# Patient Record
Sex: MALE | Race: WHITE | Employment: FULL TIME | URBAN - METROPOLITAN AREA
[De-identification: names, ages, dates, MRNs, and addresses within clinical notes are randomized per-mention and may not be internally consistent; named-entity substitution may affect disease eponyms.]

---

## 2017-01-23 ENCOUNTER — HOSPITAL ENCOUNTER (EMERGENCY)
Age: 57
Discharge: HOME | End: 2017-01-23
Attending: EMERGENCY MEDICINE

## 2017-01-23 ENCOUNTER — APPOINTMENT (OUTPATIENT)
Dept: GENERAL RADIOLOGY | Age: 57
End: 2017-01-23
Attending: PHYSICIAN ASSISTANT

## 2017-01-23 VITALS
DIASTOLIC BLOOD PRESSURE: 78 MMHG | BODY MASS INDEX: 32.47 KG/M2 | TEMPERATURE: 98.2 F | SYSTOLIC BLOOD PRESSURE: 140 MMHG | HEIGHT: 73 IN | RESPIRATION RATE: 18 BRPM | HEART RATE: 89 BPM | WEIGHT: 245 LBS | OXYGEN SATURATION: 94 %

## 2017-01-23 DIAGNOSIS — J20.9 ACUTE BRONCHITIS, UNSPECIFIED ORGANISM: Primary | ICD-10-CM

## 2017-01-23 RX ORDER — HYDROCODONE POLISTIREX AND CHLORPHENIRAMINE POLISTIREX 10; 8 MG/5ML; MG/5ML
5 SUSPENSION, EXTENDED RELEASE ORAL
Qty: 60 ML | Refills: 0 | Status: ON HOLD | OUTPATIENT
Start: 2017-01-23 | End: 2018-05-19

## 2017-01-23 RX ORDER — LEVOFLOXACIN 500 MG/1
500 TABLET, FILM COATED ORAL DAILY
Qty: 7 TAB | Refills: 0 | Status: ON HOLD | OUTPATIENT
Start: 2017-01-23 | End: 2018-05-19

## 2017-01-23 NOTE — DISCHARGE INSTRUCTIONS
Bronchitis: Care Instructions  Your Care Instructions    Bronchitis is inflammation of the bronchial tubes, which carry air to the lungs. The tubes swell and produce mucus, or phlegm. The mucus and inflamed bronchial tubes make you cough. You may have trouble breathing. Most cases of bronchitis are caused by viruses like those that cause colds. Antibiotics usually do not help and they may be harmful. Bronchitis usually develops rapidly and lasts about 2 to 3 weeks in otherwise healthy people. Follow-up care is a key part of your treatment and safety. Be sure to make and go to all appointments, and call your doctor if you are having problems. It's also a good idea to know your test results and keep a list of the medicines you take. How can you care for yourself at home? · Take all medicines exactly as prescribed. Call your doctor if you think you are having a problem with your medicine. · Get some extra rest.  · Take an over-the-counter pain medicine, such as acetaminophen (Tylenol), ibuprofen (Advil, Motrin), or naproxen (Aleve) to reduce fever and relieve body aches. Read and follow all instructions on the label. · Do not take two or more pain medicines at the same time unless the doctor told you to. Many pain medicines have acetaminophen, which is Tylenol. Too much acetaminophen (Tylenol) can be harmful. · Take an over-the-counter cough medicine that contains dextromethorphan to help quiet a dry, hacking cough so that you can sleep. Avoid cough medicines that have more than one active ingredient. Read and follow all instructions on the label. · Breathe moist air from a humidifier, hot shower, or sink filled with hot water. The heat and moisture will thin mucus so you can cough it out. · Do not smoke. Smoking can make bronchitis worse. If you need help quitting, talk to your doctor about stop-smoking programs and medicines. These can increase your chances of quitting for good.   When should you call for help? Call 911 anytime you think you may need emergency care. For example, call if:  · You have severe trouble breathing. Call your doctor now or seek immediate medical care if:  · You have new or worse trouble breathing. · You cough up dark brown or bloody mucus (sputum). · You have a new or higher fever. · You have a new rash. Watch closely for changes in your health, and be sure to contact your doctor if:  · You cough more deeply or more often, especially if you notice more mucus or a change in the color of your mucus. · You are not getting better as expected. Where can you learn more? Go to http://olegario-justin.info/. Enter H333 in the search box to learn more about \"Bronchitis: Care Instructions. \"  Current as of: May 23, 2016  Content Version: 11.1  © 9373-8675 [employer]. Care instructions adapted under license by [employer] (which disclaims liability or warranty for this information). If you have questions about a medical condition or this instruction, always ask your healthcare professional. [address] any warranty or liability for your use of this information.

## 2017-01-23 NOTE — UC PROVIDER NOTE
Patient is a 61 y.o. male presenting with fever and cough. The history is provided by the patient. Fever    This is a new problem. The current episode started more than 1 week ago. The problem has been resolved. Patient reports a subjective fever - was not measured. Associated symptoms include cough. Pertinent negatives include no chest pain. He has tried nothing for the symptoms. Cough   The current episode started more than 1 week ago. The problem occurs every few minutes. The problem has not changed since onset. The cough is non-productive. Patient reports a subjective fever - was not measured. Associated symptoms include rhinorrhea. Pertinent negatives include no chest pain, no chills, no sweats and no wheezing. He has tried nothing for the symptoms. He is a smoker. His past medical history is significant for COPD. His past medical history does not include pneumonia or asthma. Past Medical History   Diagnosis Date    Diabetes ([facility] [address]     Glaucoma      right eye    Hypercholesteremia     Hypertension     Kidney stones     MI, old         Past Surgical History   Procedure Laterality Date    Hx coronary stent placement      Hx coronary artery bypass graft      Hx wisdom teeth extraction           No family history on file. Social History     Social History    Marital status:      Spouse name: N/A    Number of children: N/A    Years of education: N/A     Occupational History    Not on file. Social History Main Topics    Smoking status: Current Every Day Smoker    Smokeless tobacco: Not on file    Alcohol use Yes    Drug use: No    Sexual activity: Not on file     Other Topics Concern    Not on file     Social History Narrative    No narrative on file                ALLERGIES: Review of patient's allergies indicates no known allergies. Review of Systems   Constitutional: Positive for fever. Negative for chills. HENT: Positive for rhinorrhea.     Respiratory: Positive for cough. Negative for wheezing. Cardiovascular: Negative for chest pain. Vitals:    01/23/17 1307   BP: 140/78   Pulse: 89   Resp: 18   Temp: 98.2 °F (36.8 °C)   SpO2: 94%   Weight: 111.1 kg (245 lb)   Height: 6' 1\" (1.854 m)       Physical Exam   Constitutional: He appears well-developed and well-nourished. HENT:   Right Ear: External ear normal.   Left Ear: External ear normal.   Eyes: EOM are normal.   Cardiovascular: Normal rate, regular rhythm and normal heart sounds. Pulmonary/Chest: Effort normal. No respiratory distress. He has wheezes. He has no rales. He exhibits no tenderness. Neurological: He is alert. Skin: Skin is warm and dry. Psychiatric: He has a normal mood and affect. His behavior is normal. Judgment and thought content normal.   Nursing note and vitals reviewed. MDM     Differential Diagnosis; Clinical Impression; Plan:     CLINICAL IMPRESSION:  Acute bronchitis, unspecified organism  (primary encounter diagnosis)    Plan:  1. levaquin   2. tussionex  3. Amount and/or Complexity of Data Reviewed:   Tests in the radiology section of CPT®:  Ordered and reviewed  Risk of Significant Complications, Morbidity, and/or Mortality:   Presenting problems: Moderate  Diagnostic procedures: Moderate  Management options:   Moderate  Progress:   Patient progress:  Stable      Procedures

## 2017-10-05 ENCOUNTER — HOSPITAL ENCOUNTER (OUTPATIENT)
Dept: ULTRASOUND IMAGING | Age: 57
Discharge: HOME | End: 2017-10-05
Payer: COMMERCIAL

## 2017-10-05 DIAGNOSIS — R74.8 INCREASED LIVER ENZYMES: ICD-10-CM

## 2017-10-05 PROCEDURE — 76700 US EXAM ABDOM COMPLETE: CPT

## 2018-01-13 ENCOUNTER — HOSPITAL ENCOUNTER (OUTPATIENT)
Dept: MRI IMAGING | Age: 58
Discharge: HOME | End: 2018-01-13
Payer: COMMERCIAL

## 2018-01-13 VITALS — WEIGHT: 250 LBS | BODY MASS INDEX: 32.98 KG/M2

## 2018-01-13 DIAGNOSIS — N28.9 KIDNEY TROUBLE: ICD-10-CM

## 2018-01-13 LAB — CREAT BLD-MCNC: 0.8 MG/DL (ref 0.6–1.3)

## 2018-01-13 PROCEDURE — 74183 MRI ABD W/O CNTR FLWD CNTR: CPT

## 2018-01-13 PROCEDURE — A9577 INJ MULTIHANCE: HCPCS

## 2018-01-13 PROCEDURE — 74011000258 HC RX REV CODE- 258

## 2018-01-13 PROCEDURE — 74011250636 HC RX REV CODE- 250/636

## 2018-01-13 PROCEDURE — 82565 ASSAY OF CREATININE: CPT

## 2018-01-13 RX ORDER — SODIUM CHLORIDE 0.9 % (FLUSH) 0.9 %
10 SYRINGE (ML) INJECTION
Status: COMPLETED | OUTPATIENT
Start: 2018-01-13 | End: 2018-01-13

## 2018-01-13 RX ADMIN — GADOBENATE DIMEGLUMINE 20 ML: 529 INJECTION, SOLUTION INTRAVENOUS at 09:25

## 2018-01-13 RX ADMIN — Medication 10 ML: at 09:25

## 2018-01-13 RX ADMIN — SODIUM CHLORIDE 100 ML: 900 INJECTION, SOLUTION INTRAVENOUS at 09:24

## 2018-05-18 ENCOUNTER — HOSPITAL ENCOUNTER (INPATIENT)
Age: 58
LOS: 5 days | Discharge: HOME | DRG: 246 | End: 2018-05-23
Attending: EMERGENCY MEDICINE | Admitting: HOSPITALIST
Payer: COMMERCIAL

## 2018-05-18 ENCOUNTER — APPOINTMENT (OUTPATIENT)
Dept: GENERAL RADIOLOGY | Age: 58
DRG: 246 | End: 2018-05-18
Attending: EMERGENCY MEDICINE
Payer: COMMERCIAL

## 2018-05-18 DIAGNOSIS — I20.0 UNSTABLE ANGINA (HCC): Primary | ICD-10-CM

## 2018-05-18 DIAGNOSIS — I21.4 NSTEMI (NON-ST ELEVATED MYOCARDIAL INFARCTION) (HCC): ICD-10-CM

## 2018-05-18 LAB
ALBUMIN SERPL-MCNC: 3.5 G/DL (ref 3.5–5)
ALBUMIN/GLOB SERPL: 1.1 {RATIO} (ref 1.1–2.2)
ALP SERPL-CCNC: 139 U/L (ref 45–117)
ALT SERPL-CCNC: 49 U/L (ref 12–78)
ANION GAP SERPL CALC-SCNC: 12 MMOL/L (ref 5–15)
APTT PPP: 25.9 SEC (ref 22.1–32)
APTT PPP: 26.3 SEC (ref 22.1–32)
AST SERPL-CCNC: 23 U/L (ref 15–37)
BASOPHILS # BLD: 0 K/UL (ref 0–0.1)
BASOPHILS # BLD: 0.2 K/UL (ref 0–0.1)
BASOPHILS NFR BLD: 0 % (ref 0–1)
BASOPHILS NFR BLD: 2 % (ref 0–1)
BILIRUB SERPL-MCNC: 0.3 MG/DL (ref 0.2–1)
BNP SERPL-MCNC: 254 PG/ML (ref 0–125)
BUN SERPL-MCNC: 14 MG/DL (ref 6–20)
BUN/CREAT SERPL: 14 (ref 12–20)
CALCIUM SERPL-MCNC: 8.6 MG/DL (ref 8.5–10.1)
CHLORIDE SERPL-SCNC: 103 MMOL/L (ref 97–108)
CK SERPL-CCNC: 154 U/L (ref 39–308)
CO2 SERPL-SCNC: 23 MMOL/L (ref 21–32)
CREAT SERPL-MCNC: 0.98 MG/DL (ref 0.7–1.3)
DIFFERENTIAL METHOD BLD: ABNORMAL
DIFFERENTIAL METHOD BLD: ABNORMAL
EOSINOPHIL # BLD: 0.1 K/UL (ref 0–0.4)
EOSINOPHIL # BLD: 0.5 K/UL (ref 0–0.4)
EOSINOPHIL NFR BLD: 1 % (ref 0–7)
EOSINOPHIL NFR BLD: 5 % (ref 0–7)
ERYTHROCYTE [DISTWIDTH] IN BLOOD BY AUTOMATED COUNT: 13.5 % (ref 11.5–14.5)
ERYTHROCYTE [DISTWIDTH] IN BLOOD BY AUTOMATED COUNT: 13.7 % (ref 11.5–14.5)
GLOBULIN SER CALC-MCNC: 3.3 G/DL (ref 2–4)
GLUCOSE BLD STRIP.AUTO-MCNC: 236 MG/DL (ref 65–100)
GLUCOSE SERPL-MCNC: 244 MG/DL (ref 65–100)
HCT VFR BLD AUTO: 45 % (ref 36.6–50.3)
HCT VFR BLD AUTO: 45.6 % (ref 36.6–50.3)
HGB BLD-MCNC: 15.1 G/DL (ref 12.1–17)
HGB BLD-MCNC: 15.3 G/DL (ref 12.1–17)
IMM GRANULOCYTES # BLD: 0 K/UL
IMM GRANULOCYTES # BLD: 0 K/UL
IMM GRANULOCYTES NFR BLD AUTO: 0 %
IMM GRANULOCYTES NFR BLD AUTO: 0 %
LYMPHOCYTES # BLD: 3.8 K/UL (ref 0.8–3.5)
LYMPHOCYTES # BLD: 4.1 K/UL (ref 0.8–3.5)
LYMPHOCYTES NFR BLD: 38 % (ref 12–49)
LYMPHOCYTES NFR BLD: 44 % (ref 12–49)
MCH RBC QN AUTO: 29.8 PG (ref 26–34)
MCH RBC QN AUTO: 29.9 PG (ref 26–34)
MCHC RBC AUTO-ENTMCNC: 33.6 G/DL (ref 30–36.5)
MCHC RBC AUTO-ENTMCNC: 33.6 G/DL (ref 30–36.5)
MCV RBC AUTO: 88.9 FL (ref 80–99)
MCV RBC AUTO: 89.1 FL (ref 80–99)
MONOCYTES # BLD: 0.3 K/UL (ref 0–1)
MONOCYTES # BLD: 0.5 K/UL (ref 0–1)
MONOCYTES NFR BLD: 3 % (ref 5–13)
MONOCYTES NFR BLD: 5 % (ref 5–13)
NEUTS SEG # BLD: 4.2 K/UL (ref 1.8–8)
NEUTS SEG # BLD: 5.5 K/UL (ref 1.8–8)
NEUTS SEG NFR BLD: 46 % (ref 32–75)
NEUTS SEG NFR BLD: 56 % (ref 32–75)
NRBC # BLD: 0 K/UL (ref 0–0.01)
NRBC # BLD: 0 K/UL (ref 0–0.01)
NRBC BLD-RTO: 0 PER 100 WBC
NRBC BLD-RTO: 0 PER 100 WBC
PLATELET # BLD AUTO: 257 K/UL (ref 150–400)
PLATELET # BLD AUTO: 259 K/UL (ref 150–400)
PMV BLD AUTO: 10 FL (ref 8.9–12.9)
PMV BLD AUTO: 10.1 FL (ref 8.9–12.9)
POTASSIUM SERPL-SCNC: 3.8 MMOL/L (ref 3.5–5.1)
PROT SERPL-MCNC: 6.8 G/DL (ref 6.4–8.2)
RBC # BLD AUTO: 5.06 M/UL (ref 4.1–5.7)
RBC # BLD AUTO: 5.12 M/UL (ref 4.1–5.7)
RBC MORPH BLD: ABNORMAL
RBC MORPH BLD: ABNORMAL
SERVICE CMNT-IMP: ABNORMAL
SODIUM SERPL-SCNC: 138 MMOL/L (ref 136–145)
THERAPEUTIC RANGE,PTTT: NORMAL SECS (ref 58–77)
THERAPEUTIC RANGE,PTTT: NORMAL SECS (ref 58–77)
TROPONIN I SERPL-MCNC: 0.49 NG/ML
WBC # BLD AUTO: 9.3 K/UL (ref 4.1–11.1)
WBC # BLD AUTO: 9.9 K/UL (ref 4.1–11.1)
WBC MORPH BLD: ABNORMAL
WBC MORPH BLD: ABNORMAL

## 2018-05-18 PROCEDURE — 80053 COMPREHEN METABOLIC PANEL: CPT | Performed by: EMERGENCY MEDICINE

## 2018-05-18 PROCEDURE — 65660000000 HC RM CCU STEPDOWN

## 2018-05-18 PROCEDURE — 83880 ASSAY OF NATRIURETIC PEPTIDE: CPT | Performed by: EMERGENCY MEDICINE

## 2018-05-18 PROCEDURE — 85730 THROMBOPLASTIN TIME PARTIAL: CPT | Performed by: EMERGENCY MEDICINE

## 2018-05-18 PROCEDURE — 99283 EMERGENCY DEPT VISIT LOW MDM: CPT

## 2018-05-18 PROCEDURE — 36415 COLL VENOUS BLD VENIPUNCTURE: CPT | Performed by: EMERGENCY MEDICINE

## 2018-05-18 PROCEDURE — 74011250636 HC RX REV CODE- 250/636: Performed by: EMERGENCY MEDICINE

## 2018-05-18 PROCEDURE — 93005 ELECTROCARDIOGRAM TRACING: CPT

## 2018-05-18 PROCEDURE — 84484 ASSAY OF TROPONIN QUANT: CPT | Performed by: EMERGENCY MEDICINE

## 2018-05-18 PROCEDURE — 85025 COMPLETE CBC W/AUTO DIFF WBC: CPT | Performed by: EMERGENCY MEDICINE

## 2018-05-18 PROCEDURE — 82962 GLUCOSE BLOOD TEST: CPT

## 2018-05-18 PROCEDURE — 74011250637 HC RX REV CODE- 250/637: Performed by: EMERGENCY MEDICINE

## 2018-05-18 PROCEDURE — 82550 ASSAY OF CK (CPK): CPT | Performed by: EMERGENCY MEDICINE

## 2018-05-18 PROCEDURE — 71046 X-RAY EXAM CHEST 2 VIEWS: CPT

## 2018-05-18 RX ORDER — INSULIN GLARGINE 100 [IU]/ML
42 INJECTION, SOLUTION SUBCUTANEOUS DAILY
Status: DISCONTINUED | OUTPATIENT
Start: 2018-05-19 | End: 2018-05-23 | Stop reason: HOSPADM

## 2018-05-18 RX ORDER — INSULIN LISPRO 100 [IU]/ML
INJECTION, SOLUTION INTRAVENOUS; SUBCUTANEOUS
Status: DISCONTINUED | OUTPATIENT
Start: 2018-05-19 | End: 2018-05-23 | Stop reason: HOSPADM

## 2018-05-18 RX ORDER — ASPIRIN 81 MG/1
81 TABLET ORAL DAILY
Status: DISCONTINUED | OUTPATIENT
Start: 2018-05-19 | End: 2018-05-23 | Stop reason: HOSPADM

## 2018-05-18 RX ORDER — GUAIFENESIN 100 MG/5ML
324 LIQUID (ML) ORAL
Status: COMPLETED | OUTPATIENT
Start: 2018-05-18 | End: 2018-05-18

## 2018-05-18 RX ORDER — MAGNESIUM SULFATE 100 %
4 CRYSTALS MISCELLANEOUS AS NEEDED
Status: DISCONTINUED | OUTPATIENT
Start: 2018-05-18 | End: 2018-05-23 | Stop reason: HOSPADM

## 2018-05-18 RX ORDER — LISINOPRIL 20 MG/1
20 TABLET ORAL DAILY
Status: DISCONTINUED | OUTPATIENT
Start: 2018-05-19 | End: 2018-05-23 | Stop reason: HOSPADM

## 2018-05-18 RX ORDER — DULOXETIN HYDROCHLORIDE 30 MG/1
30 CAPSULE, DELAYED RELEASE ORAL DAILY
Status: DISCONTINUED | OUTPATIENT
Start: 2018-05-19 | End: 2018-05-23 | Stop reason: HOSPADM

## 2018-05-18 RX ORDER — IBUPROFEN 200 MG
1 TABLET ORAL DAILY
Status: DISCONTINUED | OUTPATIENT
Start: 2018-05-19 | End: 2018-05-23 | Stop reason: HOSPADM

## 2018-05-18 RX ORDER — SODIUM CHLORIDE 0.9 % (FLUSH) 0.9 %
5-10 SYRINGE (ML) INJECTION AS NEEDED
Status: DISCONTINUED | OUTPATIENT
Start: 2018-05-18 | End: 2018-05-23 | Stop reason: HOSPADM

## 2018-05-18 RX ORDER — PRASUGREL 10 MG/1
10 TABLET, FILM COATED ORAL DAILY
Status: DISCONTINUED | OUTPATIENT
Start: 2018-05-19 | End: 2018-05-23 | Stop reason: HOSPADM

## 2018-05-18 RX ORDER — HEPARIN SODIUM 10000 [USP'U]/100ML
8-25 INJECTION, SOLUTION INTRAVENOUS
Status: DISCONTINUED | OUTPATIENT
Start: 2018-05-18 | End: 2018-05-21

## 2018-05-18 RX ORDER — HEPARIN SODIUM 5000 [USP'U]/ML
34.7 INJECTION, SOLUTION INTRAVENOUS; SUBCUTANEOUS ONCE
Status: COMPLETED | OUTPATIENT
Start: 2018-05-18 | End: 2018-05-18

## 2018-05-18 RX ORDER — INSULIN GLARGINE 100 [IU]/ML
85 INJECTION, SOLUTION SUBCUTANEOUS DAILY
Status: DISCONTINUED | OUTPATIENT
Start: 2018-05-19 | End: 2018-05-18 | Stop reason: SDUPTHER

## 2018-05-18 RX ORDER — INSULIN GLARGINE 100 [IU]/ML
43 INJECTION, SOLUTION SUBCUTANEOUS DAILY
Status: DISCONTINUED | OUTPATIENT
Start: 2018-05-19 | End: 2018-05-23 | Stop reason: HOSPADM

## 2018-05-18 RX ORDER — NEBIVOLOL 5 MG/1
5 TABLET ORAL EVERY EVENING
Status: DISCONTINUED | OUTPATIENT
Start: 2018-05-19 | End: 2018-05-23 | Stop reason: HOSPADM

## 2018-05-18 RX ORDER — PRASUGREL 10 MG/1
10 TABLET, FILM COATED ORAL
Status: DISCONTINUED | OUTPATIENT
Start: 2018-05-18 | End: 2018-05-18 | Stop reason: SDUPTHER

## 2018-05-18 RX ORDER — DEXTROSE 50 % IN WATER (D50W) INTRAVENOUS SYRINGE
12.5-25 AS NEEDED
Status: DISCONTINUED | OUTPATIENT
Start: 2018-05-18 | End: 2018-05-23 | Stop reason: HOSPADM

## 2018-05-18 RX ORDER — SODIUM CHLORIDE 0.9 % (FLUSH) 0.9 %
5-10 SYRINGE (ML) INJECTION EVERY 8 HOURS
Status: DISCONTINUED | OUTPATIENT
Start: 2018-05-18 | End: 2018-05-23 | Stop reason: HOSPADM

## 2018-05-18 RX ADMIN — ASPIRIN 81 MG 324 MG: 81 TABLET ORAL at 20:34

## 2018-05-18 RX ADMIN — HEPARIN SODIUM 9 UNITS/KG/HR: 10000 INJECTION, SOLUTION INTRAVENOUS at 22:15

## 2018-05-18 RX ADMIN — HEPARIN SODIUM 4000 UNITS: 5000 INJECTION, SOLUTION INTRAVENOUS; SUBCUTANEOUS at 22:15

## 2018-05-18 NOTE — IP AVS SNAPSHOT
2700 [facility] [address] [place] [address] 
[phone] Patient: Sonia Earl MRN: [id number] NHM:[id number]/85/[id number] About your hospitalization You were admitted on:  May 18, 2018 You last received care in the:  [facility] 4 CV SERVICES UNIT You were discharged on:  May 23, 2018 Why you were hospitalized Your primary diagnosis was:  Nstemi (Non-St Elevated Myocardial Infarction) (Hcc) Follow-up Information Follow up With Details Comments Contact Info [facility] - Cardiopulmonary Rehab Call To enroll in Cardiac Rehab Helensigrid Asif Jaredsigrid [address] [place], 40 [facility] 
[phone] Sandra Brown MD On 5/31/2018 12:00 Noon on Thursday 5/31/18 15[facility] [address] [place] [address] 
[phone] Gladis Aguilar NP On 5/25/2018 Hospital follow up appointment at 11:00 AM on Friday 5/25/18 400 [facility] 1001 [facility] [address] [phone] Severo Henriquez MD On 6/13/2018 9:15 AM 1808 [facility] [address] [facility] [address] 
[phone] Discharge Orders None A check rikki indicates which time of day the medication should be taken. My Medications START taking these medications Instructions Each Dose to Equal  
 Morning Noon Evening Bedtime  
 furosemide 40 mg tablet Commonly known as:  LASIX Your last dose was: Your next dose is: Take 1 Tab by mouth daily. 40 mg CONTINUE taking these medications Instructions Each Dose to Equal  
 Morning Noon Evening Bedtime  
 aspirin delayed-release 81 mg tablet Your last dose was: Your next dose is: Take 1 Tab by mouth daily. 81 mg  
    
   
   
   
  
 BYSTOLIC 5 mg tablet Generic drug:  nebivolol Your last dose was: Your next dose is: Take 5 mg by mouth every evening. 5 mg CYMBALTA 30 mg capsule Generic drug:  DULoxetine Your last dose was: Your next dose is: Take 30 mg by mouth daily. 30 mg FARXIGA 10 mg Tab tablet Generic drug:  dapagliflozin Your last dose was: Your next dose is: Take 10 mg by mouth daily. 10 mg  
    
   
   
   
  
 glipiZIDE 10 mg tablet Commonly known as:  Terrance Tabares Your last dose was: Your next dose is: Take 10 mg by mouth two (2) times a day. 10 mg  
    
   
   
   
  
 insulin glargine 100 unit/mL (3 mL) Inpn Commonly known as:  Maria Isabel Saini Your last dose was: Your next dose is:    
   
   
 85 Units by SubCUTAneous route nightly. 85 Units  
    
   
   
   
  
 lisinopril 20 mg tablet Commonly known as:  Natalia Coffman Your last dose was: Your next dose is: Take 20 mg by mouth daily. 20 mg  
    
   
   
   
  
 prasugrel 10 mg tablet Commonly known as:  EFFIENT Your last dose was: Your next dose is: Take 1 Tab by mouth nightly. 10 mg  
    
   
   
   
  
 REPATHA PUSHTRONEX SC Your last dose was: Your next dose is:    
   
   
 10 mg by SubCUTAneous route. Every 2 weeks on ; next dose due 18  
 10 mg Where to Get Your Medications Information on where to get these meds will be given to you by the nurse or doctor. ! Ask your nurse or doctor about these medications  
  furosemide 40 mg tablet Discharge Instructions 200 [facility],  [address] [place] [address]    [phone] [address]     [email or web address] Patient Discharge Instructions Marta Tam / [de-identified] : 1960 Admitted 2018 Discharged: 2018 Take Home Medications · It is important that you take the medication exactly as they are prescribed. · Keep your medication in the bottles provided by the pharmacist and keep a list of the medication names, dosages, and times to be taken in your wallet. · Do not take other medications without consulting your doctor. BRING ALL OF YOUR MEDICINES TO YOUR OFFICE VISIT with Dr. Serge Medel. What to do at [facility] Recommended activity: Activity as tolerated. Return to work May 29. Follow-up with Tiana Hanna MD in 1 week Follow-up with your primary care physician in 1 month. See [facility] re:  Update evaluation and management of sleep apnea. Start vegan diet. Lifestyle changes Do not smoke. Smoking increases your risk of another heart attack. If you need help quitting, talk to your doctor about stop-smoking programs and medicines. These can increase your chances of quitting for good. Eat a heart-healthy diet that is low in cholesterol, saturated fat, and salt, and is full of fruits, vegetables and whole-grains. Eat at least two servings of fish each week. You may get more details about how to eat healthy, but these tips can help you get started. Avoid colds and flu. Get a pneumococcal vaccine shot. If you have had one before, ask your doctor whether you need a second dose. Get a flu shot every fall. If you must be around people with colds or flu, wash your hands often. When should you call for help? Call 911 anytime you think you may need emergency care. For example, call if: 
You have signs of a heart attack. These may include: 
Chest pain or pressure. Sweating. Shortness of breath. Nausea or vomiting. Pain that spreads from the chest to the neck, jaw, or one or both shoulders or arms. Dizziness or lightheadedness. A fast or irregular pulse. After calling 911, chew 1 adult-strength aspirin. Wait for an ambulance. Do not try to drive yourself. You passed out (lost consciousness). You feel like you are having another heart attack. Call your doctor now or seek immediate medical care if: 
You have had any chest pain, even if it has gone away. You have new or increased shortness of breath. You are dizzy or lightheaded, or you feel like you may faint. Watch closely for changes in your health, and be sure to contact your doctor if you have any problem Information obtained by : 
I understand that if any problems occur once I am at home I am to contact my physician. I understand and acknowledge receipt of the instructions indicated above. R.N.'s Signature                                                                  Date/Time Patient or Representative Signature                                                          Date/Time 
 
 
Nkechi Hernandez MD 
 
    
[facility], [address]   [phone] [address]    www.[email or web address] Smoking Cessation Program: This is a free, phone/text/email based, smoking cessation program. The program is individualized to meet each patient's needs. To enroll use the link [email or web address]/[email or web address] or text Nirmala Burris to [phone] from any smart phone. [employer] Announcement We are excited to announce that we are making your provider's discharge notes available to you in Med[employer]Match. You will see these notes when they are completed and signed by the physician that discharged you from your recent hospital stay. If you have any questions or concerns about any information you see in [employer], please call the Health Information Department where you were seen or reach out to your Primary Care Provider for more information about your plan of care. Introducing [facility]! Dear Bryant Khan: Thank you for requesting a [employer] account. Our records indicate that you already have an active [employer] account. You can access your account anytime at [email or web address] Did you know that you can access your hospital and ER discharge instructions at any time in [employer]? You can also review all of your test results from your hospital stay or ER visit. Additional Information If you have questions, please visit the Frequently Asked Questions section of the [employer] website at [email or web address] Remember, [employer] is NOT to be used for urgent needs. For medical emergencies, dial 911. Now available from your iPhone and Android! Introducing Amos Harp As a [employer] patient, I wanted to make you aware of our electronic visit tool called Amos Harp. [employer] 24/7 allows you to connect within minutes with a medical provider 24 hours a day, seven days a week via a mobile device or tablet or logging into a secure website from your computer. You can access Amos Harp from anywhere in the [place]. A virtual visit might be right for you when you have a simple condition and feel like you just dont want to get out of bed, or cant get away from work for an appointment, when your regular [employer] provider is not available (evenings, weekends or holidays), or when youre out of town and need minor care. Electronic visits cost only $49 and if the [employer] 24/7 provider determines a prescription is needed to treat your condition, one can be electronically transmitted to a nearby pharmacy*. Please take a moment to enroll today if you have not already done so. The enrollment process is free and takes just a few minutes. To enroll, please download the [employer] 24/7 cassandra to your tablet or phone, or visit [email or web address] to enroll on your computer. And, as an [address] [place] [address] patient with a [employer] account, the results of your visits will be scanned into your electronic medical record and your primary care provider will be able to view the scanned results. We urge you to continue to see your regular [facility] provider for your ongoing medical care. And while your primary care provider may not be the one available when you seek a Amos Chamberlainbirdfin virtual visit, the peace of mind you get from getting a real diagnosis real time can be priceless. For more information on Amos Chamberlainbirdfin, view our Frequently Asked Questions (FAQs) at [email or web address]. Sincerely, 
 
Yennifer Rawls MD 
Chief Medical Officer Romi Monreal *:  certain medications cannot be prescribed via Amos Chamberlaindianna Providers Seen During Your Hospitalization Provider Specialty Primary office phone Margie Tamayo MD Emergency Medicine [phone] Mahendra Peterson MD Hospitalist [phone] Radha Noel MD Internal Medicine [phone] Joshua Joel MD Hospitalist [phone] Your Primary Care Physician (PCP) Primary Care Physician Office Phone Office Fax Marcus Reef [phone] You are allergic to the following No active allergies Recent Documentation Height Weight BMI Smoking Status 1.88 m 110.3 kg 31.22 kg/m2 Current Every Day Smoker Emergency Contacts Name Discharge Info Relation Home Work Mobile EkaterinaDagmar mayorga DISCHARGE CAREGIVER [3] Spouse [3] [phone] Patient Belongings The following personal items are in your possession at time of discharge: 
  Dental Appliances: None  Visual Aid: Contacts, With patient Discharge Instructions Attachments/References HEART FAILURE: AVOIDING TRIGGERS (ENGLISH) Patient Handouts Avoiding Triggers With Heart Failure: Care Instructions Your Care Instructions Triggers are anything that make your heart failure flare up. A flare-up is also called \"sudden heart failure\" or \"acute heart failure. \" When you have a flare-up, fluid builds up in your lungs, and you have problems breathing. You might need to go to the hospital. By watching for changes in your condition and avoiding triggers, you can prevent heart failure flare-ups. Follow-up care is a key part of your treatment and safety. Be sure to make and go to all appointments, and call your doctor if you are having problems. It's also a good idea to know your test results and keep a list of the medicines you take. How can you care for yourself at home? Watch for changes in your weight and condition · Weigh yourself without clothing at the same time each day. Record your weight. Call your doctor if you have sudden weight gain, such as more than 2 to 3 pounds in a day or 5 pounds in a week. (Your doctor may suggest a different range of weight gain.) A sudden weight gain may mean that your heart failure is getting worse. · Keep a daily record of your symptoms. Write down any changes in how you feel, such as new shortness of breath, cough, or problems eating. Also record if your ankles are more swollen than usual and if you feel more tired than usual. Note anything that you ate or did that could have triggered these changes. Limit sodium Sodium causes your body to hold on to extra water. This may cause your heart failure symptoms to get worse. People get most of their sodium from processed foods. Fast food and restaurant meals also tend to be very high in sodium. · Your doctor may suggest that you limit sodium to 2,000 milligrams (mg) a day or less. That is less than 1 teaspoon of salt a day, including all the salt you eat in cooking or in packaged foods. · Read food labels on cans and food packages.  They tell you how much sodium you get in one serving. Check the serving size. If you eat more than one serving, you are getting more sodium. · Be aware that sodium can come in forms other than salt, including monosodium glutamate (MSG), sodium citrate, and sodium bicarbonate (baking soda). MSG is often added to Asian food. You can sometimes ask for food without MSG or salt. · Slowly reducing salt will help you adjust to the taste. Take the salt shaker off the table. · Flavor your food with garlic, lemon juice, onion, vinegar, herbs, and spices instead of salt. Do not use soy sauce, steak sauce, onion salt, garlic salt, mustard, or ketchup on your food, unless it is labeled \"low-sodium\" or \"low-salt. \" 
· Make your own salad dressings, sauces, and ketchup without adding salt. · Use fresh or frozen ingredients, instead of canned ones, whenever you can. Choose low-sodium canned goods. · Eat less processed food and food from restaurants, including fast food. Exercise as directed Moderate, regular exercise is very good for your heart. It improves your blood flow and helps control your weight. But too much exercise can stress your heart and cause a heart failure flare-up. · Check with your doctor before you start an exercise program. 
· Walking is an easy way to get exercise. Start out slowly. Gradually increase the length and pace of your walk. Swimming, riding a bike, and using a treadmill are also good forms of exercise. · When you exercise, watch for signs that your heart is working too hard. You are pushing yourself too hard if you cannot talk while you are exercising. If you become short of breath or dizzy or have chest pain, stop, sit down, and rest. 
· Do not exercise when you do not feel well. Take medicines correctly · Take your medicines exactly as prescribed. Call your doctor if you think you are having a problem with your medicine. · Make a list of all the medicines you take.  Include those prescribed to you by other doctors and any over-the-counter medicines, vitamins, or supplements you take. Take this list with you when you go to any doctor. · Take your medicines at the same time every day. It may help you to post a list of all the medicines you take every day and what time of day you take them. · Make taking your medicine as simple as you can. Plan times to take your medicines when you are doing other things, such as eating a meal or getting ready for bed. This will make it easier to remember to take your medicines. · Get organized. Use helpful tools, such as daily or weekly pill containers. When should you call for help? Call 911 if you have symptoms of sudden heart failure such as: 
? · You have severe trouble breathing. ? · You cough up pink, foamy mucus. ? · You have a new irregular or rapid heartbeat. ?Call your doctor now or seek immediate medical care if: 
? · You have new or increased shortness of breath. ? · You are dizzy or lightheaded, or you feel like you may faint. ? · You have sudden weight gain, such as more than 2 to 3 pounds in a day or 5 pounds in a week. (Your doctor may suggest a different range of weight gain.) ? · You have increased swelling in your legs, ankles, or feet. ? · You are suddenly so tired or weak that you cannot do your usual activities. ? Watch closely for changes in your health, and be sure to contact your doctor if you develop new symptoms. Where can you learn more? Go to http://olegario-justin.info/. Enter Y432 in the search box to learn more about \"Avoiding Triggers With Heart Failure: Care Instructions. \" Current as of: September 21, 2016 Content Version: 11.4 © 8353-2527 [employer]. Care instructions adapted under license by [employer] (which disclaims liability or warranty for this information).  If you have questions about a medical condition or this instruction, always ask your healthcare professional. [address] any warranty or liability for your use of this information. Please provide this summary of care documentation to your next provider. Signatures-by signing, you are acknowledging that this After Visit Summary has been reviewed with you and you have received a copy. Patient Signature:  ____________________________________________________________ Date:  ____________________________________________________________  
  
[facility] Provider Signature:  ____________________________________________________________ Date:  ____________________________________________________________

## 2018-05-18 NOTE — IP AVS SNAPSHOT
Summary of Care Report The Summary of Care report has been created to help improve care coordination. Users with access to [email or web address] or [address] (Web-based application) may access additional patient information including the Discharge Summary. If you are not currently a [address] user and need more information, please call the number listed below in the [place] [address] section and ask to be connected with Medical Records. Facility Information Name Address Phone [address] [place] [id number] [phone] Patient Information Patient Name Sex  Jaiden Caldwell ([id number]) Male 1960 Discharge Information Admitting Provider Service Area Unit Chapis Jones MD / [address] [facility] [address] [facility] Unit / [phone] Discharge Provider Discharge Date/Time Discharge Disposition Destination (none) 2018 (Pending) AHR (none) Patient Language Language ENGLISH [13] Hospital Problems as of 2018  Never Reviewed Class Noted - Resolved Last Modified POA Active Problems * (Principal)NSTEMI (non-ST elevated myocardial infarction) ([facility] [address]  2018 - Present 2018 by Chapis Jones MD Yes Entered by Chapis Jones MD  
  
You are allergic to the following No active allergies Current Discharge Medication List  
  
START taking these medications Dose & Instructions Dispensing Information Comments  
 furosemide 40 mg tablet Commonly known as:  LASIX Dose:  40 mg Take 1 Tab by mouth daily. Quantity:  30 Tab Refills:  11 CONTINUE these medications which have NOT CHANGED Dose & Instructions Dispensing Information Comments  
 aspirin delayed-release 81 mg tablet Dose:  81 mg Take 1 Tab by mouth daily. Quantity:  30 Tab Refills:  11 BYSTOLIC 5 mg tablet Generic drug:  nebivolol Dose:  5 mg Take 5 mg by mouth every evening. Refills:  0  
   
 CYMBALTA 30 mg capsule Generic drug:  DULoxetine Dose:  30 mg Take 30 mg by mouth daily. Refills:  0 FARXIGA 10 mg Tab tablet Generic drug:  dapagliflozin Dose:  10 mg Take 10 mg by mouth daily. Refills:  0  
   
 glipiZIDE 10 mg tablet Commonly known as:  Radha Lands Dose:  10 mg Take 10 mg by mouth two (2) times a day. Refills:  0  
   
 insulin glargine 100 unit/mL (3 mL) Inpn Commonly known as:  Artice Adry Dose:  85 Units 85 Units by SubCUTAneous route nightly. Refills:  0  
   
 lisinopril 20 mg tablet Commonly known as:  Lollie Jump Dose:  20 mg Take 20 mg by mouth daily. Refills:  0  
   
 prasugrel 10 mg tablet Commonly known as:  EFFIENT Dose:  10 mg Take 1 Tab by mouth nightly. Quantity:  30 Tab Refills:  11  
   
 REPATHA PUSHTRONEX SC Dose:  10 mg  
10 mg by SubCUTAneous route. Every 2 weeks on ; next dose due 18 Refills:  0 Follow-up Information Follow up With Details Comments Contact Info [facility] - Cardiopulmonary Rehab Call To enroll in Cardiac Rehab Helen Coleman [facility] 144. [facility], 40 [facility] 
[phone] Juanpablo Loya MD On 2018 12:00 Noon on 18 200 [facility] [address] [place] [address] 
[phone] Avis Wang NP On 2018 Hospital follow up appointment at 11:00 AM on 18 400 [facility] 1001 [facility] [address] [phone] Mikki Stokes MD On 2018 9:15 AM 1808 [facility] [address] [facility] [address] 
[phone] Discharge Instructions 200 [facility],  109 [facility]    [phone] [facility], [address] [place] [address]     [email or web address] [facility] Patient Discharge Instructions Alexis Javier / [de-identified] : 1960 Admitted 2018 Discharged: 2018 Take Home Medications · It is important that you take the medication exactly as they are prescribed. · Keep your medication in the bottles provided by the pharmacist and keep a list of the medication names, dosages, and times to be taken in your wallet. · Do not take other medications without consulting your doctor. BRING ALL OF YOUR MEDICINES TO YOUR OFFICE VISIT with Dr. Mik De León. What to do at [facility] Recommended activity: Activity as tolerated. Return to work May 29. Follow-up with Marce Bowen MD in 1 week Follow-up with your primary care physician in 1 month. See [facility] re:  Update evaluation and management of sleep apnea. Start vegan diet. Lifestyle changes Do not smoke. Smoking increases your risk of another heart attack. If you need help quitting, talk to your doctor about stop-smoking programs and medicines. These can increase your chances of quitting for good. Eat a heart-healthy diet that is low in cholesterol, saturated fat, and salt, and is full of fruits, vegetables and whole-grains. Eat at least two servings of fish each week. You may get more details about how to eat healthy, but these tips can help you get started. Avoid colds and flu. Get a pneumococcal vaccine shot. If you have had one before, ask your doctor whether you need a second dose. Get a flu shot every fall. If you must be around people with colds or flu, wash your hands often. When should you call for help? Call 911 anytime you think you may need emergency care. For example, call if: 
You have signs of a heart attack. These may include: 
Chest pain or pressure. Sweating. Shortness of breath. Nausea or vomiting. Pain that spreads from the chest to the neck, jaw, or one or both shoulders or arms. Dizziness or lightheadedness. A fast or irregular pulse. After calling 911, chew 1 adult-strength aspirin. Wait for an ambulance. Do not try to drive yourself. You passed out (lost consciousness). You feel like you are having another heart attack. Call your doctor now or seek immediate medical care if: 
You have had any chest pain, even if it has gone away. You have new or increased shortness of breath. You are dizzy or lightheaded, or you feel like you may faint. Watch closely for changes in your health, and be sure to contact your doctor if you have any problem Information obtained by : 
I understand that if any problems occur once I am at home I am to contact my physician. I understand and acknowledge receipt of the instructions indicated above. R.N.'s Signature                                                                  Date/Time Patient or Representative Signature                                                          Date/Time 
 
 
Rocael Beal MD 
 
    
[address], [address]   [phone] [address]    www.[employer] Smoking Cessation Program: This is a free, phone/text/email based, smoking cessation program. The program is individualized to meet each patient's needs. To enroll use the link https://ha.WiFast/ra/survey/2860 or text [employer] to 360 5405 from any smart phone. Chart Review Routing History Recipient Method Report Sent By Isabell Mandujano NP Fax: [phone] Phone: [phone] Fax Delia Akhtar MD NOTES AUTO ROUTING REPORT MD [facility] 10/22/2016 12:39 PM 10/22/2016 Cheng Mandujano NP Fax: [phone] Phone: [phone] Fax Delia Akhtar MD NOTES AUTO ROUTING REPORT Rocael Beal MD [facility] 10/25/2016 12:31 PM 10/25/2016  Cheng Mandujano NP  
 Fax: [phone] Phone: [phone] Fax Federico Mills MD NOTES AUTO ROUTING REPORT MD Ashley Palmer 10/28/2016  8:24 AM 10/28/2016

## 2018-05-18 NOTE — IP AVS SNAPSHOT
[address] [facility] [address] [facility] 
[phone] Patient: Amairani Bowen MRN: [id number] AF A check rikki indicates which time of day the medication should be taken. My Medications START taking these medications Instructions Each Dose to Equal  
 Morning Noon Evening Bedtime  
 furosemide 40 mg tablet Commonly known as:  LASIX Your last dose was: Your next dose is: Take 1 Tab by mouth daily. 40 mg CONTINUE taking these medications Instructions Each Dose to Equal  
 Morning Noon Evening Bedtime  
 aspirin delayed-release 81 mg tablet Your last dose was: Your next dose is: Take 1 Tab by mouth daily. 81 mg  
    
   
   
   
  
 BYSTOLIC 5 mg tablet Generic drug:  nebivolol Your last dose was: Your next dose is: Take 5 mg by mouth every evening. 5 mg CYMBALTA 30 mg capsule Generic drug:  DULoxetine Your last dose was: Your next dose is: Take 30 mg by mouth daily. 30 mg FARXIGA 10 mg Tab tablet Generic drug:  dapagliflozin Your last dose was: Your next dose is: Take 10 mg by mouth daily. 10 mg  
    
   
   
   
  
 glipiZIDE 10 mg tablet Commonly known as:  Alley Harley Your last dose was: Your next dose is: Take 10 mg by mouth two (2) times a day. 10 mg  
    
   
   
   
  
 insulin glargine 100 unit/mL (3 mL) Inpn Commonly known as:  Ofilia Bosworth Your last dose was: Your next dose is:    
   
   
 85 Units by SubCUTAneous route nightly. 85 Units  
    
   
   
   
  
 lisinopril 20 mg tablet Commonly known as:  Ian Emery Your last dose was: Your next dose is: Take 20 mg by mouth daily.   
 20 mg  
    
   
   
 prasugrel 10 mg tablet Commonly known as:  EFFIENT Your last dose was: Your next dose is: Take 1 Tab by mouth nightly. 10 mg  
    
   
   
   
  
 REPATHA PUSHTRONEX SC Your last dose was: Your next dose is:    
   
   
 10 mg by SubCUTAneous route. Every 2 weeks on Sunday; next dose due 5/20/18  
 10 mg Where to Get Your Medications Information on where to get these meds will be given to you by the nurse or doctor. ! Ask your nurse or doctor about these medications  
  furosemide 40 mg tablet

## 2018-05-19 LAB
APTT PPP: 23.7 SEC (ref 22.1–32)
APTT PPP: 39.6 SEC (ref 22.1–32)
APTT PPP: 48.3 SEC (ref 22.1–32)
ATRIAL RATE: 80 BPM
CALCULATED P AXIS, ECG09: 44 DEGREES
CALCULATED R AXIS, ECG10: -2 DEGREES
CALCULATED T AXIS, ECG11: 87 DEGREES
DIAGNOSIS, 93000: NORMAL
EST. AVERAGE GLUCOSE BLD GHB EST-MCNC: 163 MG/DL
GLUCOSE BLD STRIP.AUTO-MCNC: 123 MG/DL (ref 65–100)
GLUCOSE BLD STRIP.AUTO-MCNC: 131 MG/DL (ref 65–100)
GLUCOSE BLD STRIP.AUTO-MCNC: 169 MG/DL (ref 65–100)
GLUCOSE BLD STRIP.AUTO-MCNC: 171 MG/DL (ref 65–100)
HBA1C MFR BLD: 7.3 % (ref 4.2–6.3)
P-R INTERVAL, ECG05: 176 MS
Q-T INTERVAL, ECG07: 390 MS
QRS DURATION, ECG06: 112 MS
QTC CALCULATION (BEZET), ECG08: 449 MS
SERVICE CMNT-IMP: ABNORMAL
THERAPEUTIC RANGE,PTTT: ABNORMAL SECS (ref 58–77)
THERAPEUTIC RANGE,PTTT: ABNORMAL SECS (ref 58–77)
THERAPEUTIC RANGE,PTTT: NORMAL SECS (ref 58–77)
TROPONIN I SERPL-MCNC: 0.38 NG/ML
TROPONIN I SERPL-MCNC: 0.43 NG/ML
VENTRICULAR RATE, ECG03: 80 BPM

## 2018-05-19 PROCEDURE — 82962 GLUCOSE BLOOD TEST: CPT

## 2018-05-19 PROCEDURE — 74011636637 HC RX REV CODE- 636/637: Performed by: HOSPITALIST

## 2018-05-19 PROCEDURE — 74011250636 HC RX REV CODE- 250/636: Performed by: EMERGENCY MEDICINE

## 2018-05-19 PROCEDURE — 83036 HEMOGLOBIN GLYCOSYLATED A1C: CPT | Performed by: HOSPITALIST

## 2018-05-19 PROCEDURE — 74011250636 HC RX REV CODE- 250/636: Performed by: HOSPITALIST

## 2018-05-19 PROCEDURE — 85730 THROMBOPLASTIN TIME PARTIAL: CPT | Performed by: HOSPITALIST

## 2018-05-19 PROCEDURE — 84484 ASSAY OF TROPONIN QUANT: CPT | Performed by: HOSPITALIST

## 2018-05-19 PROCEDURE — 74011250637 HC RX REV CODE- 250/637: Performed by: HOSPITALIST

## 2018-05-19 PROCEDURE — 65660000000 HC RM CCU STEPDOWN

## 2018-05-19 PROCEDURE — 36415 COLL VENOUS BLD VENIPUNCTURE: CPT | Performed by: HOSPITALIST

## 2018-05-19 PROCEDURE — 74011250637 HC RX REV CODE- 250/637: Performed by: NURSE PRACTITIONER

## 2018-05-19 PROCEDURE — C8923 2D TTE W OR W/O FOL W/CON,CO: HCPCS

## 2018-05-19 PROCEDURE — 74011636637 HC RX REV CODE- 636/637: Performed by: FAMILY MEDICINE

## 2018-05-19 RX ORDER — INSULIN GLARGINE 100 [IU]/ML
85 INJECTION, SOLUTION SUBCUTANEOUS
COMMUNITY

## 2018-05-19 RX ORDER — HEPARIN SODIUM 5000 [USP'U]/ML
4000 INJECTION, SOLUTION INTRAVENOUS; SUBCUTANEOUS ONCE
Status: COMPLETED | OUTPATIENT
Start: 2018-05-19 | End: 2018-05-19

## 2018-05-19 RX ORDER — HEPARIN SODIUM 5000 [USP'U]/ML
2000 INJECTION, SOLUTION INTRAVENOUS; SUBCUTANEOUS ONCE
Status: COMPLETED | OUTPATIENT
Start: 2018-05-19 | End: 2018-05-19

## 2018-05-19 RX ORDER — SODIUM CHLORIDE 0.9 % (FLUSH) 0.9 %
20 SYRINGE (ML) INJECTION
Status: COMPLETED | OUTPATIENT
Start: 2018-05-19 | End: 2018-05-19

## 2018-05-19 RX ORDER — ACETAMINOPHEN 325 MG/1
650 TABLET ORAL
Status: DISCONTINUED | OUTPATIENT
Start: 2018-05-19 | End: 2018-05-23 | Stop reason: HOSPADM

## 2018-05-19 RX ORDER — INSULIN GLARGINE 100 [IU]/ML
42 INJECTION, SOLUTION SUBCUTANEOUS ONCE
Status: COMPLETED | OUTPATIENT
Start: 2018-05-19 | End: 2018-05-19

## 2018-05-19 RX ORDER — ALPRAZOLAM 0.25 MG/1
0.25 TABLET ORAL
Status: DISCONTINUED | OUTPATIENT
Start: 2018-05-19 | End: 2018-05-23 | Stop reason: HOSPADM

## 2018-05-19 RX ORDER — ALPRAZOLAM 0.25 MG/1
0.25 TABLET ORAL DAILY PRN
Status: DISCONTINUED | OUTPATIENT
Start: 2018-05-19 | End: 2018-05-19

## 2018-05-19 RX ADMIN — NEBIVOLOL HYDROCHLORIDE 5 MG: 5 TABLET ORAL at 18:14

## 2018-05-19 RX ADMIN — NITROGLYCERIN 1 INCH: 20 OINTMENT TOPICAL at 05:51

## 2018-05-19 RX ADMIN — NITROGLYCERIN 1 INCH: 20 OINTMENT TOPICAL at 01:03

## 2018-05-19 RX ADMIN — Medication 10 ML: at 14:10

## 2018-05-19 RX ADMIN — Medication 20 ML: at 10:11

## 2018-05-19 RX ADMIN — INSULIN GLARGINE 42 UNITS: 100 INJECTION, SOLUTION SUBCUTANEOUS at 20:52

## 2018-05-19 RX ADMIN — HEPARIN SODIUM 4000 UNITS: 5000 INJECTION, SOLUTION INTRAVENOUS; SUBCUTANEOUS at 05:51

## 2018-05-19 RX ADMIN — INSULIN LISPRO 2 UNITS: 100 INJECTION, SOLUTION INTRAVENOUS; SUBCUTANEOUS at 11:30

## 2018-05-19 RX ADMIN — HEPARIN SODIUM 4000 UNITS: 5000 INJECTION, SOLUTION INTRAVENOUS; SUBCUTANEOUS at 12:06

## 2018-05-19 RX ADMIN — HEPARIN SODIUM 2000 UNITS: 5000 INJECTION, SOLUTION INTRAVENOUS; SUBCUTANEOUS at 20:52

## 2018-05-19 RX ADMIN — HEPARIN SODIUM 19 UNITS/KG/HR: 10000 INJECTION, SOLUTION INTRAVENOUS at 19:57

## 2018-05-19 RX ADMIN — ALPRAZOLAM 0.25 MG: 0.25 TABLET ORAL at 08:50

## 2018-05-19 RX ADMIN — HEPARIN SODIUM 13 UNITS/KG/HR: 10000 INJECTION, SOLUTION INTRAVENOUS at 04:25

## 2018-05-19 RX ADMIN — INSULIN GLARGINE 42 UNITS: 100 INJECTION, SOLUTION SUBCUTANEOUS at 03:03

## 2018-05-19 RX ADMIN — ASPIRIN 81 MG: 81 TABLET, COATED ORAL at 08:41

## 2018-05-19 RX ADMIN — ACETAMINOPHEN 650 MG: 325 TABLET ORAL at 08:50

## 2018-05-19 RX ADMIN — HEPARIN SODIUM 17 UNITS/KG/HR: 10000 INJECTION, SOLUTION INTRAVENOUS at 16:16

## 2018-05-19 RX ADMIN — NITROGLYCERIN 1 INCH: 20 OINTMENT TOPICAL at 18:14

## 2018-05-19 RX ADMIN — DULOXETINE HYDROCHLORIDE 30 MG: 30 CAPSULE, DELAYED RELEASE ORAL at 08:41

## 2018-05-19 RX ADMIN — PRASUGREL HYDROCHLORIDE 10 MG: 10 TABLET, FILM COATED ORAL at 09:48

## 2018-05-19 RX ADMIN — PERFLUTREN 1.5 ML: 6.52 INJECTION, SUSPENSION INTRAVENOUS at 10:11

## 2018-05-19 RX ADMIN — INSULIN GLARGINE 43 UNITS: 100 INJECTION, SOLUTION SUBCUTANEOUS at 20:52

## 2018-05-19 RX ADMIN — NITROGLYCERIN 1 INCH: 20 OINTMENT TOPICAL at 12:10

## 2018-05-19 RX ADMIN — NITROGLYCERIN 1 INCH: 20 OINTMENT TOPICAL at 23:45

## 2018-05-19 RX ADMIN — LISINOPRIL 20 MG: 20 TABLET ORAL at 08:40

## 2018-05-19 RX ADMIN — ALPRAZOLAM 0.25 MG: 0.25 TABLET ORAL at 21:07

## 2018-05-19 NOTE — ED TRIAGE NOTES
Pt presents with complaints of left sided chest pain and shortness of breath x 2 weeks. Pt reports pain as constant.  Symptoms similar to previous heart attack two years ago

## 2018-05-19 NOTE — PROGRESS NOTES
Bedside shift change report given to Marcia (oncoming nurse) by Caleb Maharaj (offgoing nurse). Report included the following information SBAR, Intake/Output, MAR, Recent Results and Cardiac Rhythm NSR.

## 2018-05-19 NOTE — CONSULTS
S CARDIOLOGY CONSULT              Date of  Admission: 5/18/2018  8:11 PM            Assessment and Plan: Gwen Rajan is admitted with chest pain and nstemi. # NSTEMI - severe underlying CAD. Currently pain free and no active ischemia on ecg.  rec IV heparin for now. - [facility] Monday. - cont outpt meds. Not on statin due to intolerances. # DM  # HTN         REASON FOR CONSULT: NSTEMI  Primary Cardiologist: Belen Julian    HPI:  Pleasant 60 yom admitted with chest pain. He reports being in [address] [place] [address] until a few weeks ago. He has had episodic chest pain and discomfort. He thought this was indigestion, however continued to have some symptoms. They occurred both at rest and with activity. NO orthopnea or pnd. He reports compliance to meds. Wife works for [employer] and is able to supplement history. He was tried on repatha recently due to multiple stain intolerances. 2016 Cath Report:  --  Severe diffuse disease of all native vessels and 2/3 bypass conduits  as detailed below. --  Left main: Normal.  --  LAD: There was a diffuse 100 % stenosis at the site of a prior stent. --  1st diagonal: The vessel was small sized. Angiography showed severe  atherosclerosis and patent prior stents. There was no antegrade flow after  the mid vessel because of the competing flow from the SVG. --  1st obtuse  marginal: There was a 100 % stenosis at the site of a prior stent. --   RCA: There was a tubular 60 % stenosis in the proximal third of the vessel  segment. There was SUNIL grade 3 flow through the vessel (brisk flow). In a  second lesion, there was a tubular 50 % stenosis in the middle third of  the vessel segment. There was SUNIL grade 3 flow through the vessel (brisk  flow). --  Graft to the LAD: The graft was a LIMA. The LIMA originated  from the left subclavian artery proximally and distally was grafted to the  mid LAD with brisk antegrade and retrograde filling of the entire vessel.   The LIMA was a large well developed conduit and normal in appearance. --   Graft to the 1st diagonal: There was a tubular 50 % stenosis in the  proximal third of the graft. There was no evidence of associated thrombus  and SUNIL grade 3 flow through the graft (brisk flow). --  Graft to the  distal RCA: The graft was a large sized saphenous vein graft from the  ascending aorta. Graft angiography showed moderate atherosclerosis. Patient Active Problem List    Diagnosis Date Noted    NSTEMI (non-ST elevated myocardial infarction) ([facility] 05/18/2018      Ilene Catherine MD  Past Medical History:   Diagnosis Date    Diabetes ([facility]     Glaucoma     right eye    Hypercholesteremia     Hypertension     Kidney stones     MI, old       Past Surgical History:   Procedure Laterality Date    HX CORONARY ARTERY BYPASS GRAFT      HX CORONARY STENT PLACEMENT      HX WISDOM TEETH EXTRACTION       No Known Allergies   History reviewed. No pertinent family history. Social History     Social History    Marital status:      Spouse name: N/A    Number of children: N/A    Years of education: N/A     Occupational History    Not on file.      Social History Main Topics    Smoking status: Current Every Day Smoker    Smokeless tobacco: Not on file    Alcohol use Yes    Drug use: No    Sexual activity: Not on file     Other Topics Concern    Not on file     Social History Narrative     Current Facility-Administered Medications   Medication Dose Route Frequency    acetaminophen (TYLENOL) tablet 650 mg  650 mg Oral Q4H PRN    ALPRAZolam (XANAX) tablet 0.25 mg  0.25 mg Oral DAILY PRN    heparin 25,000 units in D5W 250 ml infusion  8-25 Units/kg/hr IntraVENous TITRATE    aspirin delayed-release tablet 81 mg  81 mg Oral DAILY    nebivolol (BYSTOLIC) tablet 5 mg  5 mg Oral QPM    lisinopril (PRINIVIL, ZESTRIL) tablet 20 mg  20 mg Oral DAILY    DULoxetine (CYMBALTA) capsule 30 mg  30 mg Oral DAILY    nicotine (NICODERM CQ) 14 mg/24 hr patch 1 Patch  1 Patch TransDERmal DAILY    sodium chloride (NS) flush 5-10 mL  5-10 mL IntraVENous Q8H    sodium chloride (NS) flush 5-10 mL  5-10 mL IntraVENous PRN    glucose chewable tablet 16 g  4 Tab Oral PRN    dextrose (D50W) injection syrg 12.5-25 g  12.5-25 g IntraVENous PRN    glucagon (GLUCAGEN) injection 1 mg  1 mg IntraMUSCular PRN    nitroglycerin (NITROBID) 2 % ointment 1 Inch  1 Inch Topical Q6H    insulin lispro (HUMALOG) injection   SubCUTAneous AC&HS    prasugrel (EFFIENT) tablet 10 mg  10 mg Oral DAILY    insulin glargine (LANTUS) injection 42 Units (Syringe 1 of 2. Total dose=85 units)  42 Units SubCUTAneous DAILY    And    insulin glargine (LANTUS) injection 43 Units (Syringe 2 of 2. Total dose =85 units)  43 Units SubCUTAneous DAILY           Review of Symptoms:  A comprehensive review of systems was negative in 11 points other than stated above in HPI. Physical Exam    Visit Vitals    /68 (BP 1 Location: Right arm, BP Patient Position: At rest)    Pulse 69    Temp 98 °F (36.7 °C)    Resp 16    Ht 6' 2\" (1.88 m)    Wt 109.9 kg (242 lb 4.6 oz)    SpO2 94%    BMI 31.11 kg/m2     Skin warm and dry  HEENT: WNL  Oropharynx without exudate. Neck supple  Lungs clear  Heart - RRR, Normal S1/ S2   No Mummurs, click or Rubs  No S3 or S4  Abdomen soft and non tender,   Pulses 2+ throughout   Neuro:  Normal facial grimace,  Moves all extremities.    AAAO   Psych: unanxious    Labs:   Recent Results (from the past 24 hour(s))   EKG, 12 LEAD, INITIAL    Collection Time: 05/18/18  8:07 PM   Result Value Ref Range    Ventricular Rate 80 BPM    Atrial Rate 80 BPM    P-R Interval 176 ms    QRS Duration 112 ms    Q-T Interval 390 ms    QTC Calculation (Bezet) 449 ms    Calculated P Axis 44 degrees    Calculated R Axis -2 degrees    Calculated T Axis 87 degrees    Diagnosis       Normal sinus rhythm  Inferior infarct (cited on or before 22-OCT-2016)  When compared with ECG of 25-OCT-2016 03:30,  ST no longer depressed in Anterior leads     CBC WITH AUTOMATED DIFF    Collection Time: 05/18/18  8:12 PM   Result Value Ref Range    WBC 9.3 4.1 - 11.1 K/uL    RBC 5.12 4.10 - 5.70 M/uL    HGB 15.3 12.1 - 17.0 g/dL    HCT 45.6 36.6 - 50.3 %    MCV 89.1 80.0 - 99.0 FL    MCH 29.9 26.0 - 34.0 PG    MCHC 33.6 30.0 - 36.5 g/dL    RDW 13.5 11.5 - 14.5 %    PLATELET 128 575 - 461 K/uL    MPV 10.0 8.9 - 12.9 FL    NRBC 0.0 0  WBC    ABSOLUTE NRBC 0.00 0.00 - 0.01 K/uL    NEUTROPHILS 46 32 - 75 %    LYMPHOCYTES 44 12 - 49 %    MONOCYTES 3 (L) 5 - 13 %    EOSINOPHILS 5 0 - 7 %    BASOPHILS 2 (H) 0 - 1 %    IMMATURE GRANULOCYTES 0 %    ABS. NEUTROPHILS 4.2 1.8 - 8.0 K/UL    ABS. LYMPHOCYTES 4.1 (H) 0.8 - 3.5 K/UL    ABS. MONOCYTES 0.3 0.0 - 1.0 K/UL    ABS. EOSINOPHILS 0.5 (H) 0.0 - 0.4 K/UL    ABS. BASOPHILS 0.2 (H) 0.0 - 0.1 K/UL    ABS. IMM. GRANS. 0.0 K/UL    DF MANUAL      RBC COMMENTS OVALOCYTES  1+        WBC COMMENTS REACTIVE LYMPHS     METABOLIC PANEL, COMPREHENSIVE    Collection Time: 05/18/18  8:12 PM   Result Value Ref Range    Sodium 138 136 - 145 mmol/L    Potassium 3.8 3.5 - 5.1 mmol/L    Chloride 103 97 - 108 mmol/L    CO2 23 21 - 32 mmol/L    Anion gap 12 5 - 15 mmol/L    Glucose 244 (H) 65 - 100 mg/dL    BUN 14 6 - 20 MG/DL    Creatinine 0.98 0.70 - 1.30 MG/DL    BUN/Creatinine ratio 14 12 - 20      GFR est AA >60 >60 ml/min/1.73m2    GFR est non-AA >60 >60 ml/min/1.73m2    Calcium 8.6 8.5 - 10.1 MG/DL    Bilirubin, total 0.3 0.2 - 1.0 MG/DL    ALT (SGPT) 49 12 - 78 U/L    AST (SGOT) 23 15 - 37 U/L    Alk.  phosphatase 139 (H) 45 - 117 U/L    Protein, total 6.8 6.4 - 8.2 g/dL    Albumin 3.5 3.5 - 5.0 g/dL    Globulin 3.3 2.0 - 4.0 g/dL    A-G Ratio 1.1 1.1 - 2.2     CK W/ REFLX CKMB    Collection Time: 05/18/18  8:12 PM   Result Value Ref Range     39 - 308 U/L   TROPONIN I    Collection Time: 05/18/18  8:12 PM   Result Value Ref Range    Troponin-I, Qt. 0.49 (H) <0.05 ng/mL   NT-PRO BNP    Collection Time: 05/18/18  8:12 PM   Result Value Ref Range    NT pro- (H) 0 - 125 PG/ML   PTT    Collection Time: 05/18/18  8:12 PM   Result Value Ref Range    aPTT 26.3 22.1 - 32.0 sec    aPTT, therapeutic range     58.0 - 77.0 SECS   PTT    Collection Time: 05/18/18  9:26 PM   Result Value Ref Range    aPTT 25.9 22.1 - 32.0 sec    aPTT, therapeutic range     58.0 - 77.0 SECS   CBC WITH AUTOMATED DIFF    Collection Time: 05/18/18  9:46 PM   Result Value Ref Range    WBC 9.9 4.1 - 11.1 K/uL    RBC 5.06 4.10 - 5.70 M/uL    HGB 15.1 12.1 - 17.0 g/dL    HCT 45.0 36.6 - 50.3 %    MCV 88.9 80.0 - 99.0 FL    MCH 29.8 26.0 - 34.0 PG    MCHC 33.6 30.0 - 36.5 g/dL    RDW 13.7 11.5 - 14.5 %    PLATELET 336 702 - 690 K/uL    MPV 10.1 8.9 - 12.9 FL    NRBC 0.0 0  WBC    ABSOLUTE NRBC 0.00 0.00 - 0.01 K/uL    NEUTROPHILS 56 32 - 75 %    LYMPHOCYTES 38 12 - 49 %    MONOCYTES 5 5 - 13 %    EOSINOPHILS 1 0 - 7 %    BASOPHILS 0 0 - 1 %    IMMATURE GRANULOCYTES 0 %    ABS. NEUTROPHILS 5.5 1.8 - 8.0 K/UL    ABS. LYMPHOCYTES 3.8 (H) 0.8 - 3.5 K/UL    ABS. MONOCYTES 0.5 0.0 - 1.0 K/UL    ABS. EOSINOPHILS 0.1 0.0 - 0.4 K/UL    ABS. BASOPHILS 0.0 0.0 - 0.1 K/UL    ABS. IMM.  GRANS. 0.0 K/UL    DF MANUAL      RBC COMMENTS OVALOCYTES  1+        WBC COMMENTS REACTIVE LYMPHS     GLUCOSE, POC    Collection Time: 05/18/18 11:56 PM   Result Value Ref Range    Glucose (POC) 236 (H) 65 - 100 mg/dL    Performed by Gaurav DILLARD WITH EAG    Collection Time: 05/19/18  3:09 AM   Result Value Ref Range    Hemoglobin A1c 7.3 (H) 4.2 - 6.3 %    Est. average glucose 163 mg/dL   TROPONIN I    Collection Time: 05/19/18  3:16 AM   Result Value Ref Range    Troponin-I, Qt. 0.43 (H) <0.05 ng/mL   PTT    Collection Time: 05/19/18  3:16 AM   Result Value Ref Range    aPTT 23.7 22.1 - 32.0 sec    aPTT, therapeutic range     58.0 - 77.0 SECS   GLUCOSE, POC    Collection Time: 05/19/18  6:33 AM   Result Value Ref Range    Glucose (POC) 131 (H) 65 - 100 mg/dL    Performed by Hiram Schafer    PTT    Collection Time: 05/19/18  9:52 AM   Result Value Ref Range    aPTT 39.6 (H) 22.1 - 32.0 sec    aPTT, therapeutic range     58.0 - 77.0 SECS   TROPONIN I    Collection Time: 05/19/18  9:56 AM   Result Value Ref Range    Troponin-I, Qt. 0.38 (H) <0.05 ng/mL   GLUCOSE, POC    Collection Time: 05/19/18 11:42 AM   Result Value Ref Range    Glucose (POC) 171 (H) 65 - 100 mg/dL    Performed by Manda Mckeon        Cardiographics    Telemetry:  SR  ECG: SR, nsst  Echocardiogram: 2016- EF 60%

## 2018-05-19 NOTE — ED NOTES
62yo male Pt with PMH of kidney stones, HTN, DM, GERD, CAD, MI with stent placement and CABG presents to ED for SOB, MILLARD and left sided chest pain, took Prilosec for sx relief, oriented to room and call bell, cardiac and continuous spO2 monitoring initiated, IV started, blood samples collected and sent to lab for analysis, EKG and chest Xray completed, plan of care reviewed, call bell in reach, side rails up x2, will continue to monitor. 2120  Desatting to 80s, 4L NC O2 initiated to raise sats to 94, report back for critical lab of 0.49 elevated troponin, provider aware, Cardiology consult initiated, will continue to monitor.   10:02 PM  New IV placed, pre heparin drip labs drawn and sent, hospitalist at bedside for admission eval.

## 2018-05-19 NOTE — PROGRESS NOTES
Paged Cardiology consult re: troponin of 0.12.    0646 MD called back but hung up before RN could answer.

## 2018-05-19 NOTE — H&P
[address]   HISTORY AND PHYSICAL      Myrna NGUYỄN  MR#: [de-identified]  : 1960  ACCOUNT #: [de-identified]   ADMIT DATE: 2018    CHIEF COMPLAINT:  Chest pain and shortness of breath. HISTORY OF PRESENT ILLNESS:  This is a 58-year-old male. History is diabetes and acute MI, hypertension and the patient came to ED with intermittent left-sided chest pain and shortness of breath last 2 weeks. Initially, was intermittent, relieved by rest and since this morning left-sided chest pain and dyspnea is persistent and the chest pains radiated to left side of the arm with pressure feeling and 2/10. Patient feels this is exactly the same presentation as 2 years ago when he had his third MI which had stenting at that time . Current  EKG:  No ST elevation, but noted his troponin also is elevated. REVIEW OF SYSTEMS:    CONSTITUTIONAL:  Patient denied a fever. No weight changes. Sleeping okay. HEENT:  No headache, no vision changes, no nose discharge. No hearing change. RESPIRATORY:  No wheezing, no cough, no shortness of breath. CARDIOVASCULAR:  Per H and P. No feeling of palpitation. MUSCULOSKELETAL:  No joint swelling, no muscle aches. SKIN:  No rash, no itching. GASTROINTESTINAL:  No vomiting, no diarrhea. GENITOURINARY:  No dysuria, no hematuria. HEMATOLOGIC/ONCOLOGIC:  No gum bleeding, no petechia. NEUROLOGIC:  No sensation changes. No focal weakness. ENDOCRINE:  No polydipsia. PSYCHIATRIC:  Denied depression. PAST MEDICAL HISTORY:  Diabetes, acute MI, hypertension, hyperlipidemia. MEDICATIONS:  Patient no longer taking statins due to severe fibromyalgia. Other home medication includin. Lantus 85 units in the morning. 2.  Aspirin 81 mg daily. 3.  Bystolic 5 mg at night. 4.  Cymbalta 30 mg at night. 5.  Glipizide 10 mg b.i.d.    6.  Lisinopril 20 mg daily. 7.  Effient 10 mg daily. 8.  Vitamin D.    9.  Zinc daily.     SOCIAL HISTORY: The patient is still a smoker, a pack a day. No alcohol. FAMILY HISTORY:  Positive for coronary artery disease. ALLERGIES:  PATIENT NO DRUG ALLERGY. LABORATORY DATA:  On admission:  WBC 9.9, H and H 15/45, platelet 165. Sodium 138, potassium 3.8, BUN 14, creatinine 0.98. First troponin 0.49. Chest x-ray:  No acute changes and EKG revealed no ST elevation, had old inferior infarct. PHYSICAL EXAMINATION:  GENERAL:  The patient is awake, alert, oriented x3 comfortable and good conversation, well nutrition. VITAL SIGNS:  Temperature 98.7, heart rate is 76, blood pressure 135/65, sats 92 on room air. HEENT:  Pupils equal, reactive to light. No icterus. Normal head. No nose discharge, no hearing changes, no erythema in the mouth. NECK:  Supple, no JVD, no carotid bruits, no goiters, no lymph node palpable. CHEST:  Clear to auscultation and percussion. No tenderness on percussion. HEART:  Regular rate and no murmur and normal rhythm. ABDOMEN:  Soft, nontender. Bowel sounds positive. EXTREMITIES:  No edema. No swelling. Range of motion intact. NEUROLOGIC:  Nonfocal.  PSYCHIATRIC:  Denies depression. ASSESSMENT AND PLAN:  1.  Non-ST elevation myocardial infarction. The patient is started with IV heparin drip. I will continue his home aspirin and Effient, continue his Bystolic. Dr. Radha Mattson, the cardiology who covered Dr. Christina Cantor is consulted. I will keep the patient n.p.o. after midnight in case if he needs a cardiac cath tomorrow. 2.  Diabetes. We will hold his long-acting insulin tomorrow in case. If he is n.p.o.,  we will cover with sliding scale. We will hold his oral glipizide. 3.  Hypertension. Continue his Bystolic. 4.  Hyperlipidemia. The patient does not tolerate any statin and not on any medication for now. We will check a lipid panel in the morning. 5.  A smoker, provided quitting counseling. Start nicotine patch.       MD JUAN Marie/MN  D: 05/18/2018 22:42 T: 05/18/2018 23:10  JOB #: [id number]

## 2018-05-19 NOTE — PROGRESS NOTES
Hospitalist Progress Note  Pineda Fish NP  Answering service: [phone] OR [phone] from in house phone  Cell: [phone]   Date of Service:  2018  NAME:  Krupa Parmar  :  1960  MRN:  [id number]    Admission Summary:   Pt presented to the ED with intermittent left-sided chest pain and shortness of breath last 2 weeks. Initially it was intermittent and relieved by rest - since the am of admit, L-sided chest pain and dyspnea was persistent with pain radiated (2/10) to L side of the arm with pressure. Pt feels this is exactly the same presentation as 2 years ago when he had his third MI. Pmhx: diabetes and acute MI, hypertension    Interval history / Subjective:   Pt in bed, family at bedside. Discussed plan of care. Assessment & Plan:     NSTEMI:     - on IV heparin drip  - continue ASA, effient, Bystolic    - plans for cardiac cath on Monday  - not on statin due to intolerance - on Repatha  - oxygen prn    Hx Diabetes:    - hold glipizide  - continue Lantus, SSI  - blood glucose 131-236    Hx Hypertension:  Continue his Bystolic. Hx Hyperlipidemia:    - lipid panel in am  - not on statin, intolerant.   - on repatha, just started and has had two doses    Tobacco Use: A smoker, provided quitting counseling. Start nicotine patch. Anxiety:   - requested medication - gave dose of xanax  - pt reports xanax this am improved anxiety, will change to BID prn    Code status: Full  DVT prophylaxis: Heparin drip  Care Plan discussed with: patient, family, nurse and attending MD  Disposition: home when able, post cath     Hospital Problems  Never Reviewed          Codes Class Noted POA    * (Principal)NSTEMI (non-ST elevated myocardial infarction) [facility]) ICD-10-CM: I21.4  ICD-9-CM: 410.70  2018 Yes            Review of Systems:   Denies HA. Denies chest pain or pressure. Mild MILLARD. No GI complaints.      Vital Signs:    Last 24hrs VS reviewed since prior progress note. Most recent are:  Visit Vitals    /55 (BP 1 Location: Right arm, BP Patient Position: At rest)    Pulse 66    Temp 98.3 °F (36.8 °C)    Resp 18    Ht 6' 2\" (1.88 m)    Wt 109.9 kg (242 lb 4.6 oz)    SpO2 94%    BMI 31.11 kg/m2       Intake/Output Summary (Last 24 hours) at 05/19/18 1626  Last data filed at 05/19/18 1533   Gross per 24 hour   Intake           727.08 ml   Output              600 ml   Net           127.08 ml      Physical Examination:        Constitutional:  No acute distress, cooperative, pleasant    ENT:  Oral mucous membranes moist, oropharynx benign. Resp:  CTA bilaterally. No accessory muscle use, on 2 L NC   CV:  Regular rhythm, normal rate, no murmurs. SR on tele    GI:  Soft, non distended, non tender. normoactive bowel sounds     Musculoskeletal:  No edema, warm, 2+ pulses throughout    Neurologic:  Moves all extremities. AAOx3     Data Review:   Review and/or order of clinical lab test  Review and/or order of tests in the radiology section of CPT  Review and/or order of tests in the medicine section of CPT    Labs:     Recent Labs      05/18/18   2146  05/18/18 2012   WBC  9.9  9.3   HGB  15.1  15.3   HCT  45.0  45.6   PLT  257  259     Recent Labs      05/18/18 2012   NA  138   K  3.8   CL  103   CO2  23   BUN  14   CREA  0.98   GLU  244*   CA  8.6     Recent Labs      05/18/18 2012   SGOT  23   ALT  49   AP  139*   TBILI  0.3   TP  6.8   ALB  3.5   GLOB  3.3     Recent Labs      05/19/18   0952  05/19/18   0316  05/18/18 2126   APTT  39.6*  23.7  25.9      No results for input(s): FE, TIBC, PSAT, FERR in the last 72 hours. No results found for: FOL, RBCF   No results for input(s): PH, PCO2, PO2 in the last 72 hours.   Recent Labs      05/19/18   0956  05/19/18   0316  05/18/18 2012   TROIQ  0.38*  0.43*  0.49*     Lab Results   Component Value Date/Time    Cholesterol, total 207 (H) 10/23/2016 04:55 AM    HDL Cholesterol 27 10/23/2016 04:55 AM    LDL, calculated 122.8 (H) 10/23/2016 04:55 AM    Triglyceride 286 (H) 10/23/2016 04:55 AM    CHOL/HDL Ratio 7.7 (H) 10/23/2016 04:55 AM     Lab Results   Component Value Date/Time    Glucose (POC) 171 (H) 05/19/2018 11:42 AM    Glucose (POC) 131 (H) 05/19/2018 06:33 AM    Glucose (POC) 236 (H) 05/18/2018 11:56 PM    Glucose (POC) 113 (H) 10/25/2016 12:01 PM    Glucose (POC) 136 (H) 10/25/2016 06:38 AM     No results found for: COLOR, APPRN, SPGRU, REFSG, XANDER, PROTU, GLUCU, KETU, BILU, UROU, TIESHA, LEUKU, GLUKE, EPSU, BACTU, WBCU, RBCU, CASTS, UCRY    Medications Reviewed:     Current Facility-Administered Medications   Medication Dose Route Frequency    acetaminophen (TYLENOL) tablet 650 mg  650 mg Oral Q4H PRN    ALPRAZolam (XANAX) tablet 0.25 mg  0.25 mg Oral DAILY PRN    heparin 25,000 units in D5W 250 ml infusion  8-25 Units/kg/hr IntraVENous TITRATE    aspirin delayed-release tablet 81 mg  81 mg Oral DAILY    nebivolol (BYSTOLIC) tablet 5 mg  5 mg Oral QPM    lisinopril (PRINIVIL, ZESTRIL) tablet 20 mg  20 mg Oral DAILY    DULoxetine (CYMBALTA) capsule 30 mg  30 mg Oral DAILY    nicotine (NICODERM CQ) 14 mg/24 hr patch 1 Patch  1 Patch TransDERmal DAILY    sodium chloride (NS) flush 5-10 mL  5-10 mL IntraVENous Q8H    sodium chloride (NS) flush 5-10 mL  5-10 mL IntraVENous PRN    glucose chewable tablet 16 g  4 Tab Oral PRN    dextrose (D50W) injection syrg 12.5-25 g  12.5-25 g IntraVENous PRN    glucagon (GLUCAGEN) injection 1 mg  1 mg IntraMUSCular PRN    nitroglycerin (NITROBID) 2 % ointment 1 Inch  1 Inch Topical Q6H    insulin lispro (HUMALOG) injection   SubCUTAneous AC&HS    prasugrel (EFFIENT) tablet 10 mg  10 mg Oral DAILY    insulin glargine (LANTUS) injection 42 Units (Syringe 1 of 2. Total dose=85 units)  42 Units SubCUTAneous DAILY    And    insulin glargine (LANTUS) injection 43 Units (Syringe 2 of 2.  Total dose =85 units)  43 Units SubCUTAneous DAILY ______________________________________________________________________  EXPECTED LENGTH OF STAY: - - -  ACTUAL LENGTH OF STAY:          [id number] KENDRICK NP

## 2018-05-19 NOTE — ED PROVIDER NOTES
HPI Comments: 60 y.o. male with past medical history significant for MI, DM, hypercholesterolemia, HTN, kidney stones, and glaucoma who presents to the ED with chief complaint of chest pain. Patient reports intermittent left-sided chest pain that radiates to his left arm and SOB that is worse with exertion with onset ~2 weeks ago. Patient reports his symptoms are better with rest. Patient reports a history of acid reflux; reports taking Prilosec for his symptoms today, with no relief. Patient reports diarrhea with onset \"yesterday. \" Patient reports a history of \"three\" MIs; reports his symptoms today are similar his acid reflux and his past symptoms with his first MI. Patient reports his cardiologist is Dr. Bradley Robles; reports his last visit with Dr. Bradley Robles was ~1 year ago. Patient reports being on Effient and Aspirin (81 mg) regularly; reports taking his last dose \"this morning. \" Patient denies cough, fever, nausea, and vomiting. There are no other acute medical concerns at this time. PCP: Ilene Catherine MD    Note written by Dionna Luo, as dictated by Salinas Rosa MD 8:45 PM     The history is provided by the patient. Past Medical History:   Diagnosis Date    Diabetes ([facility] [address]     Glaucoma     right eye    Hypercholesteremia     Hypertension     Kidney stones     MI, old        Past Surgical History:   Procedure Laterality Date    HX CORONARY ARTERY BYPASS GRAFT      HX CORONARY STENT PLACEMENT      HX WISDOM TEETH EXTRACTION           History reviewed. No pertinent family history. Social History     Social History    Marital status:      Spouse name: N/A    Number of children: N/A    Years of education: N/A     Occupational History    Not on file.      Social History Main Topics    Smoking status: Current Every Day Smoker    Smokeless tobacco: Not on file    Alcohol use Yes    Drug use: No    Sexual activity: Not on file     Other Topics Concern    Not on file Social History Narrative         ALLERGIES: Review of patient's allergies indicates no known allergies. Review of Systems   Constitutional: Negative for chills and fever. Respiratory: Positive for shortness of breath. Negative for cough. Cardiovascular: Positive for chest pain. Gastrointestinal: Positive for diarrhea. Negative for abdominal pain, constipation, nausea and vomiting. Neurological: Negative for dizziness and light-headedness. All other systems reviewed and are negative. Vitals:    05/18/18 2038   BP: 133/72   Pulse: 81   Resp: 17   Temp: 98.7 °F (37.1 °C)   SpO2: 97%   Weight: 115.2 kg (254 lb)   Height: 6' 2\" (1.88 m)            Physical Exam   Constitutional: He appears well-developed. No distress. Obese. HENT:   Head: Normocephalic and atraumatic. Eyes: Pupils are equal, round, and reactive to light. No scleral icterus. Neck: Normal range of motion. Neck supple. Cardiovascular: Normal rate and regular rhythm. Murmur heard. Systolic murmur. Pulmonary/Chest: Effort normal and breath sounds normal.   Abdominal: Soft. He exhibits no distension. There is no tenderness. There is no rebound and no guarding. Musculoskeletal: Normal range of motion. Neurological: He is alert. Skin: Skin is warm and dry. He is not diaphoretic. Psychiatric: He has a normal mood and affect. His behavior is normal. Thought content normal.   Nursing note and vitals reviewed. Note written by Asuncion Buerger, Scribe, as dictated by Regino Doan MD 8:53 PM      MDM  Number of Diagnoses or Management Options  NSTEMI (non-ST elevated myocardial infarction) [facility]): new and requires workup  Unstable angina [facility]): new and requires workup  Diagnosis management comments: Total critical care time spent exclusive of procedures:  40 min  Pt presenting w chest pain concerning for unstable angina found to have NSTEMI. Workup negative for STEMI, PNA, PTX, pericarditis.   Pt admitted on heparin drip for continued management. Chest pain free at time of admission. Amount and/or Complexity of Data Reviewed  Clinical lab tests: ordered and reviewed  Tests in the radiology section of CPT®: reviewed and ordered  Obtain history from someone other than the patient: yes  Review and summarize past medical records: yes  Discuss the patient with other providers: yes          ED Course       Procedures    ED EKG interpretation:  Rhythm: normal sinus rhythm; and regular . Rate (approx.): 80 bpm; Axis: normal; ST/T wave: no ST/T wave changes; Q waves in the inferior leads; no ectopy. Note written by Dionna Kirby, as dictated by Delia Bustamante MD 8:55 PM    CONSULT NOTE:  9:11 PM Delia Bustamante MD spoke with Dr. Claudia Byrne MD, Consult for Cardiology. Discussed available diagnostic tests and clinical findings. Provider is in agreement with care plans as outlined. Dr. Claudia Byrne MD recommends starting the patient on a Heparin drip and admitting to the hospitalist; states he will see the patient in the morning. 9:13 PM Hospitalist team consulted for admission.

## 2018-05-19 NOTE — PROGRESS NOTES
Problem: Falls - Risk of  Goal: *Absence of Falls  Document Kecia Fall Risk and appropriate interventions in the flowsheet. Outcome: Progressing Towards Goal  Fall Risk Interventions:            Medication Interventions: Teach patient to arise slowly, Patient to call before getting OOB                  Problem: Pressure Injury - Risk of  Goal: *Prevention of pressure injury  Document Carlos Scale and appropriate interventions in the flowsheet.   Outcome: Progressing Towards Goal  Pressure Injury Interventions:                                           Problem: Unstable angina/NSTEMI: Day of Admission/Day 1  Goal: *Hemodynamically stable  Outcome: Progressing Towards Goal  vss at this time

## 2018-05-20 LAB
APTT PPP: 55.2 SEC (ref 22.1–32)
APTT PPP: 58.2 SEC (ref 22.1–32)
APTT PPP: 65.1 SEC (ref 22.1–32)
BASOPHILS # BLD: 0.1 K/UL (ref 0–0.1)
BASOPHILS NFR BLD: 1 % (ref 0–1)
CHOLEST SERPL-MCNC: 232 MG/DL
DIFFERENTIAL METHOD BLD: ABNORMAL
EOSINOPHIL # BLD: 0.4 K/UL (ref 0–0.4)
EOSINOPHIL NFR BLD: 4 % (ref 0–7)
ERYTHROCYTE [DISTWIDTH] IN BLOOD BY AUTOMATED COUNT: 13.4 % (ref 11.5–14.5)
GLUCOSE BLD STRIP.AUTO-MCNC: 113 MG/DL (ref 65–100)
GLUCOSE BLD STRIP.AUTO-MCNC: 132 MG/DL (ref 65–100)
GLUCOSE BLD STRIP.AUTO-MCNC: 135 MG/DL (ref 65–100)
GLUCOSE BLD STRIP.AUTO-MCNC: 182 MG/DL (ref 65–100)
HCT VFR BLD AUTO: 41.8 % (ref 36.6–50.3)
HDLC SERPL-MCNC: 24 MG/DL
HDLC SERPL: 9.7 {RATIO} (ref 0–5)
HGB BLD-MCNC: 13.7 G/DL (ref 12.1–17)
IMM GRANULOCYTES # BLD: 0 K/UL
IMM GRANULOCYTES NFR BLD AUTO: 0 %
LDLC SERPL CALC-MCNC: ABNORMAL MG/DL (ref 0–100)
LDLC SERPL DIRECT ASSAY-MCNC: 144 MG/DL (ref 0–100)
LIPID PROFILE,FLP: ABNORMAL
LYMPHOCYTES # BLD: 4.4 K/UL (ref 0.8–3.5)
LYMPHOCYTES NFR BLD: 48 % (ref 12–49)
MCH RBC QN AUTO: 29.6 PG (ref 26–34)
MCHC RBC AUTO-ENTMCNC: 32.8 G/DL (ref 30–36.5)
MCV RBC AUTO: 90.3 FL (ref 80–99)
MONOCYTES # BLD: 0.6 K/UL (ref 0–1)
MONOCYTES NFR BLD: 6 % (ref 5–13)
NEUTS SEG # BLD: 3.9 K/UL (ref 1.8–8)
NEUTS SEG NFR BLD: 41 % (ref 32–75)
NRBC # BLD: 0 K/UL (ref 0–0.01)
NRBC BLD-RTO: 0 PER 100 WBC
PLATELET # BLD AUTO: 248 K/UL (ref 150–400)
PMV BLD AUTO: 10.3 FL (ref 8.9–12.9)
RBC # BLD AUTO: 4.63 M/UL (ref 4.1–5.7)
RBC MORPH BLD: ABNORMAL
SERVICE CMNT-IMP: ABNORMAL
THERAPEUTIC RANGE,PTTT: ABNORMAL SECS (ref 58–77)
TRIGL SERPL-MCNC: 587 MG/DL (ref ?–150)
VLDLC SERPL CALC-MCNC: ABNORMAL MG/DL
WBC # BLD AUTO: 9.4 K/UL (ref 4.1–11.1)
WBC MORPH BLD: ABNORMAL

## 2018-05-20 PROCEDURE — 74011636637 HC RX REV CODE- 636/637: Performed by: HOSPITALIST

## 2018-05-20 PROCEDURE — 65660000000 HC RM CCU STEPDOWN

## 2018-05-20 PROCEDURE — 85730 THROMBOPLASTIN TIME PARTIAL: CPT | Performed by: HOSPITALIST

## 2018-05-20 PROCEDURE — 82962 GLUCOSE BLOOD TEST: CPT

## 2018-05-20 PROCEDURE — 74011250637 HC RX REV CODE- 250/637: Performed by: HOSPITALIST

## 2018-05-20 PROCEDURE — 74011250637 HC RX REV CODE- 250/637: Performed by: NURSE PRACTITIONER

## 2018-05-20 PROCEDURE — 80061 LIPID PANEL: CPT | Performed by: NURSE PRACTITIONER

## 2018-05-20 PROCEDURE — 85025 COMPLETE CBC W/AUTO DIFF WBC: CPT | Performed by: HOSPITALIST

## 2018-05-20 PROCEDURE — 83721 ASSAY OF BLOOD LIPOPROTEIN: CPT | Performed by: NURSE PRACTITIONER

## 2018-05-20 PROCEDURE — 74011250636 HC RX REV CODE- 250/636: Performed by: EMERGENCY MEDICINE

## 2018-05-20 PROCEDURE — 36415 COLL VENOUS BLD VENIPUNCTURE: CPT | Performed by: HOSPITALIST

## 2018-05-20 RX ADMIN — ASPIRIN 81 MG: 81 TABLET, COATED ORAL at 09:11

## 2018-05-20 RX ADMIN — DULOXETINE HYDROCHLORIDE 30 MG: 30 CAPSULE, DELAYED RELEASE ORAL at 09:11

## 2018-05-20 RX ADMIN — Medication 10 ML: at 06:49

## 2018-05-20 RX ADMIN — LISINOPRIL 20 MG: 20 TABLET ORAL at 09:11

## 2018-05-20 RX ADMIN — NITROGLYCERIN 1 INCH: 20 OINTMENT TOPICAL at 06:48

## 2018-05-20 RX ADMIN — Medication 10 ML: at 15:49

## 2018-05-20 RX ADMIN — HEPARIN SODIUM 20 UNITS/KG/HR: 10000 INJECTION, SOLUTION INTRAVENOUS at 15:48

## 2018-05-20 RX ADMIN — NITROGLYCERIN 1 INCH: 20 OINTMENT TOPICAL at 23:16

## 2018-05-20 RX ADMIN — Medication 10 ML: at 22:29

## 2018-05-20 RX ADMIN — ALPRAZOLAM 0.25 MG: 0.25 TABLET ORAL at 20:28

## 2018-05-20 RX ADMIN — ACETAMINOPHEN 650 MG: 325 TABLET ORAL at 12:32

## 2018-05-20 RX ADMIN — NITROGLYCERIN 1 INCH: 20 OINTMENT TOPICAL at 17:49

## 2018-05-20 RX ADMIN — INSULIN GLARGINE 43 UNITS: 100 INJECTION, SOLUTION SUBCUTANEOUS at 20:28

## 2018-05-20 RX ADMIN — NITROGLYCERIN 1 INCH: 20 OINTMENT TOPICAL at 12:28

## 2018-05-20 RX ADMIN — INSULIN GLARGINE 42 UNITS: 100 INJECTION, SOLUTION SUBCUTANEOUS at 20:28

## 2018-05-20 RX ADMIN — PRASUGREL HYDROCHLORIDE 10 MG: 10 TABLET, FILM COATED ORAL at 09:11

## 2018-05-20 RX ADMIN — ACETAMINOPHEN 650 MG: 325 TABLET ORAL at 02:21

## 2018-05-20 RX ADMIN — NEBIVOLOL HYDROCHLORIDE 5 MG: 5 TABLET ORAL at 17:51

## 2018-05-20 NOTE — PROGRESS NOTES
Problem: Falls - Risk of  Goal: *Absence of Falls  Document Kecia Fall Risk and appropriate interventions in the flowsheet. Outcome: Progressing Towards Goal  Fall Risk Interventions:            Medication Interventions: Patient to call before getting OOB                  Problem: Pressure Injury - Risk of  Goal: *Prevention of pressure injury  Document Carlos Scale and appropriate interventions in the flowsheet. Outcome: Progressing Towards Goal  Pressure Injury Interventions: Activity Interventions: Increase time out of bed                              Problem: Unstable angina/NSTEMI: Day 2  Goal: Respiratory  Outcome: Progressing Towards Goal  Pt now with SPO2 95% and above on room air  Goal: Psychosocial  Outcome: Progressing Towards Goal  Pt managing anxiety issues well with prn medications    Problem: Diabetes Self-Management  Goal: *Disease process and treatment process  Define diabetes and identify own type of diabetes; list 3 options for treating diabetes.    Outcome: Progressing Towards Goal  Blood glucose results and tx discussed with patient

## 2018-05-20 NOTE — ROUTINE PROCESS
Bedside and Verbal shift change report given to [facility] [id number] (oncoming nurse) by Jin Orozco (offgoing nurse). Report included the following information SBAR, Kardex, OR Summary, Procedure Summary, Intake/Output, Recent Results and Cardiac Rhythm NSR.

## 2018-05-20 NOTE — PROGRESS NOTES
Hospitalist Progress Note  Cher Goldmann, NP  Answering service: [phone] OR [phone] from in house phone  Cell: [phone]   Date of Service:  2018  NAME:  Gaby Ortiz  :  1960  MRN:  [id number]    Admission Summary:   Pt presented to the ED with intermittent left-sided chest pain and shortness of breath last 2 weeks. Initially it was intermittent and relieved by rest - since the am of admit, L-sided chest pain and dyspnea was persistent with pain radiated (2/10) to L side of the arm with pressure. Pt feels this is exactly the same presentation as 2 years ago when he had his third MI. Pmhx: diabetes and acute MI, hypertension    Interval history / Subjective:   Pt in bed, says he rested well overnight. No new complaints - still has left chest soreness but no sharp pain. Mild MILLARD. Assessment & Plan:     NSTEMI with previous hx MI and stents:     - on heparin drip  - continue ASA, effient, Bystolic    - plans for cardiac cath tomorrow  - not on statin due to intolerance - on Repatha  - oxygen prn/comfort    Hx Diabetes:    - hold glipizide  - continue Lantus, SSI  - blood glucose 131-236    Hx Hypertension: Continue Bystolic. Hx Hyperlipidemia:    - lipid panel:  Chol 232 Hdl 24 Ldl (direct) 144  - not on statin, intolerant.   - on repatha, just started and has had two doses. Today is due date for medication, wife has brought in. Will order to give that dose today (140 mg injection). Tobacco Use: A smoker, provided quitting counseling. Start nicotine patch.   - pt still smoking 1 ppd (was on 4 ppd at one time)    Anxiety: BID prn    Code status: Full  DVT prophylaxis: Heparin drip  Care Plan discussed with: patient, nurse and attending MD  Disposition: home when able, post cath     Hospital Problems  Never Reviewed          Codes Class Noted POA    * (Principal)NSTEMI (non-ST elevated myocardial infarction) ([facility] [address] ICD-10-CM: I21.4  ICD-9-CM: 410.70  5/18/2018 Yes            Review of Systems:   Denies HA. Denies chest pain or pressure. Mild MILLARD. No GI complaints. Vital Signs:    Last 24hrs VS reviewed since prior progress note. Most recent are:  Visit Vitals    /55 (BP 1 Location: Left arm, BP Patient Position: At rest)    Pulse 62    Temp 97.5 °F (36.4 °C)    Resp 18    Ht 6' 2\" (1.88 m)    Wt 108.5 kg (239 lb 3.2 oz)    SpO2 97%    BMI 30.71 kg/m2       Intake/Output Summary (Last 24 hours) at 05/20/18 1030  Last data filed at 05/20/18 0843   Gross per 24 hour   Intake           943.63 ml   Output             1750 ml   Net          -806.37 ml      Physical Examination:        Constitutional:  No acute distress, cooperative, pleasant    ENT:  Oral mucous membranes moist, oropharynx benign. Resp:  CTA bilaterally. No accessory muscle use, on 2 L NC for comfort   CV:  Regular rhythm, normal rate, no murmurs. SR on tele    GI:  Soft, non distended, non tender. Normoactive bowel sounds     Musculoskeletal:  No edema, warm, 2+ pulses throughout    Neurologic:  Moves all extremities. AAOx3     Data Review:   Review and/or order of clinical lab test  Review and/or order of tests in the radiology section of CPT  Review and/or order of tests in the medicine section of CPT    Labs:     Recent Labs      05/20/18   0217  05/18/18   2146   WBC  9.4  9.9   HGB  13.7  15.1   HCT  41.8  45.0   PLT  248  257     Recent Labs      05/18/18 2012   NA  138   K  3.8   CL  103   CO2  23   BUN  14   CREA  0.98   GLU  244*   CA  8.6     Recent Labs      05/18/18 2012   SGOT  23   ALT  49   AP  139*   TBILI  0.3   TP  6.8   ALB  3.5   GLOB  3.3     Recent Labs      05/20/18   0211 05/19/18   1824  05/19/18   0952   APTT  55.2*  48.3*  39.6*      No results for input(s): FE, TIBC, PSAT, FERR in the last 72 hours. No results found for: FOL, RBCF   No results for input(s): PH, PCO2, PO2 in the last 72 hours.   Recent Labs 05/19/18   0956  05/19/18   0316  05/18/18 2012   TROIQ  0.38*  0.43*  0.49*     Lab Results   Component Value Date/Time    Cholesterol, total 232 (H) 05/20/2018 02:11 AM    HDL Cholesterol 24 05/20/2018 02:11 AM    LDL,Direct 144 (H) 05/20/2018 02:11 AM    LDL, calculated Not calculated due to elevated triglyceride level 05/20/2018 02:11 AM    Triglyceride 587 (H) 05/20/2018 02:11 AM    CHOL/HDL Ratio 9.7 (H) 05/20/2018 02:11 AM     Lab Results   Component Value Date/Time    Glucose (POC) 132 (H) 05/20/2018 06:42 AM    Glucose (POC) 169 (H) 05/19/2018 10:23 PM    Glucose (POC) 123 (H) 05/19/2018 05:41 PM    Glucose (POC) 171 (H) 05/19/2018 11:42 AM    Glucose (POC) 131 (H) 05/19/2018 06:33 AM     No results found for: COLOR, APPRN, SPGRU, REFSG, XANDER, PROTU, GLUCU, KETU, BILU, UROU, TIESHA, LEUKU, GLUKE, EPSU, BACTU, WBCU, RBCU, CASTS, UCRY    Medications Reviewed:     Current Facility-Administered Medications   Medication Dose Route Frequency    acetaminophen (TYLENOL) tablet 650 mg  650 mg Oral Q4H PRN    ALPRAZolam (XANAX) tablet 0.25 mg  0.25 mg Oral BID PRN    heparin 25,000 units in D5W 250 ml infusion  8-25 Units/kg/hr IntraVENous TITRATE    aspirin delayed-release tablet 81 mg  81 mg Oral DAILY    nebivolol (BYSTOLIC) tablet 5 mg  5 mg Oral QPM    lisinopril (PRINIVIL, ZESTRIL) tablet 20 mg  20 mg Oral DAILY    DULoxetine (CYMBALTA) capsule 30 mg  30 mg Oral DAILY    nicotine (NICODERM CQ) 14 mg/24 hr patch 1 Patch  1 Patch TransDERmal DAILY    sodium chloride (NS) flush 5-10 mL  5-10 mL IntraVENous Q8H    sodium chloride (NS) flush 5-10 mL  5-10 mL IntraVENous PRN    glucose chewable tablet 16 g  4 Tab Oral PRN    dextrose (D50W) injection syrg 12.5-25 g  12.5-25 g IntraVENous PRN    glucagon (GLUCAGEN) injection 1 mg  1 mg IntraMUSCular PRN    nitroglycerin (NITROBID) 2 % ointment 1 Inch  1 Inch Topical Q6H    insulin lispro (HUMALOG) injection   SubCUTAneous AC&HS    prasugrel (EFFIENT) tablet 10 mg  10 mg Oral DAILY    insulin glargine (LANTUS) injection 42 Units (Syringe 1 of 2. Total dose=85 units)  42 Units SubCUTAneous DAILY    And    insulin glargine (LANTUS) injection 43 Units (Syringe 2 of 2.  Total dose =85 units)  43 Units SubCUTAneous DAILY   ______________________________________________________________________  EXPECTED LENGTH OF STAY: - - -  ACTUAL LENGTH OF STAY:          [address], NP

## 2018-05-20 NOTE — ROUTINE PROCESS
1150:  Next PTT at 1526.    1345: Consent for cardiac catheterization obtained. Patient and Redell Check aware if internention required, pt may not be returned to his room but transfer to another unit. 1650:  Pt has second therapeutic PTT:  Needs daily PTT -next due tomorrow about 1600 hours.

## 2018-05-20 NOTE — PROGRESS NOTES
S Cardiology Progress Note                                        Admit Date: 5/18/2018      Assessment/Plan:   Stefany Escobedo is admitted with chest pain and nstemi. # NSTEMI - severe underlying CAD. Currently pain free and no active ischemia on ecg.  - cont heparin. LHC tomorrow. - cont outpt meds. Not on statin due to intolerances.     # DM  # HTN      Subjective:     Patient denies any complaints. Visit Vitals    /55 (BP 1 Location: Left arm, BP Patient Position: At rest)    Pulse 62    Temp 97.5 °F (36.4 °C)    Resp 18    Ht 6' 2\" (1.88 m)    Wt 108.5 kg (239 lb 3.2 oz)    SpO2 97%    BMI 30.71 kg/m2       Intake/Output Summary (Last 24 hours) at 05/20/18 3354  Last data filed at 05/20/18 0644   Gross per 24 hour   Intake           874.21 ml   Output             1800 ml   Net          -925.79 ml       Objective:      Physical Exam:  HEENT: EOMI  Neck: supple  Resp:  CTA bilaterally;  No wheezes or rales  CV: RRR s1s2 No murmur no s3  Abd:Soft, Nontender  Ext: No edema  Neuro: Alert and oriented; Nonfocal  Skin: Warm, Dry, Intact      Telemetry: SR    Current Facility-Administered Medications   Medication Dose Route Frequency    acetaminophen (TYLENOL) tablet 650 mg  650 mg Oral Q4H PRN    ALPRAZolam (XANAX) tablet 0.25 mg  0.25 mg Oral BID PRN    heparin 25,000 units in D5W 250 ml infusion  8-25 Units/kg/hr IntraVENous TITRATE    aspirin delayed-release tablet 81 mg  81 mg Oral DAILY    nebivolol (BYSTOLIC) tablet 5 mg  5 mg Oral QPM    lisinopril (PRINIVIL, ZESTRIL) tablet 20 mg  20 mg Oral DAILY    DULoxetine (CYMBALTA) capsule 30 mg  30 mg Oral DAILY    nicotine (NICODERM CQ) 14 mg/24 hr patch 1 Patch  1 Patch TransDERmal DAILY    sodium chloride (NS) flush 5-10 mL  5-10 mL IntraVENous Q8H    sodium chloride (NS) flush 5-10 mL  5-10 mL IntraVENous PRN    glucose chewable tablet 16 g  4 Tab Oral PRN    dextrose (D50W) injection syrg 12.5-25 g  12.5-25 g IntraVENous PRN    glucagon (GLUCAGEN) injection 1 mg  1 mg IntraMUSCular PRN    nitroglycerin (NITROBID) 2 % ointment 1 Inch  1 Inch Topical Q6H    insulin lispro (HUMALOG) injection   SubCUTAneous AC&HS    prasugrel (EFFIENT) tablet 10 mg  10 mg Oral DAILY    insulin glargine (LANTUS) injection 42 Units (Syringe 1 of 2. Total dose=85 units)  42 Units SubCUTAneous DAILY    And    insulin glargine (LANTUS) injection 43 Units (Syringe 2 of 2.  Total dose =85 units)  43 Units SubCUTAneous DAILY         Data Review:   Labs:    Recent Results (from the past 24 hour(s))   PTT    Collection Time: 05/19/18  9:52 AM   Result Value Ref Range    aPTT 39.6 (H) 22.1 - 32.0 sec    aPTT, therapeutic range     58.0 - 77.0 SECS   TROPONIN I    Collection Time: 05/19/18  9:56 AM   Result Value Ref Range    Troponin-I, Qt. 0.38 (H) <0.05 ng/mL   GLUCOSE, POC    Collection Time: 05/19/18 11:42 AM   Result Value Ref Range    Glucose (POC) 171 (H) 65 - 100 mg/dL    Performed by Manda Mckeon    GLUCOSE, POC    Collection Time: 05/19/18  5:41 PM   Result Value Ref Range    Glucose (POC) 123 (H) 65 - 100 mg/dL    Performed by [facility]    PTT    Collection Time: 05/19/18  6:24 PM   Result Value Ref Range    aPTT 48.3 (H) 22.1 - 32.0 sec    aPTT, therapeutic range     58.0 - 77.0 SECS   GLUCOSE, POC    Collection Time: 05/19/18 10:23 PM   Result Value Ref Range    Glucose (POC) 169 (H) 65 - 100 mg/dL    Performed by Levan Romberg    PTT    Collection Time: 05/20/18  2:11 AM   Result Value Ref Range    aPTT 55.2 (H) 22.1 - 32.0 sec    aPTT, therapeutic range     58.0 - 77.0 SECS   LIPID PANEL    Collection Time: 05/20/18  2:11 AM   Result Value Ref Range    LIPID PROFILE          Cholesterol, total 232 (H) <200 MG/DL    Triglyceride 587 (H) <150 MG/DL    HDL Cholesterol 24 MG/DL    LDL, calculated Not calculated due to elevated triglyceride level 0 - 100 MG/DL    VLDL, calculated  MG/DL     Calculation not valid with this patient's other Lipid values. CHOL/HDL Ratio 9.7 (H) 0 - 5.0     LDL, DIRECT    Collection Time: 05/20/18  2:11 AM   Result Value Ref Range    LDL,Direct 144 (H) 0 - 100 mg/dl   CBC WITH AUTOMATED DIFF    Collection Time: 05/20/18  2:17 AM   Result Value Ref Range    WBC 9.4 4.1 - 11.1 K/uL    RBC 4.63 4.10 - 5.70 M/uL    HGB 13.7 12.1 - 17.0 g/dL    HCT 41.8 36.6 - 50.3 %    MCV 90.3 80.0 - 99.0 FL    MCH 29.6 26.0 - 34.0 PG    MCHC 32.8 30.0 - 36.5 g/dL    RDW 13.4 11.5 - 14.5 %    PLATELET 633 619 - 174 K/uL    MPV 10.3 8.9 - 12.9 FL    NRBC 0.0 0  WBC    ABSOLUTE NRBC 0.00 0.00 - 0.01 K/uL    NEUTROPHILS 41 32 - 75 %    LYMPHOCYTES 48 12 - 49 %    MONOCYTES 6 5 - 13 %    EOSINOPHILS 4 0 - 7 %    BASOPHILS 1 0 - 1 %    IMMATURE GRANULOCYTES 0 %    ABS. NEUTROPHILS 3.9 1.8 - 8.0 K/UL    ABS. LYMPHOCYTES 4.4 (H) 0.8 - 3.5 K/UL    ABS. MONOCYTES 0.6 0.0 - 1.0 K/UL    ABS. EOSINOPHILS 0.4 0.0 - 0.4 K/UL    ABS. BASOPHILS 0.1 0.0 - 0.1 K/UL    ABS. IMM.  GRANS. 0.0 K/UL    DF SMEAR SCANNED      RBC COMMENTS ANISOCYTOSIS  1+        WBC COMMENTS REACTIVE LYMPHS     GLUCOSE, POC    Collection Time: 05/20/18  6:42 AM   Result Value Ref Range    Glucose (POC) 132 (H) 65 - 100 mg/dL    Performed by Anirudh Minors

## 2018-05-21 LAB
GLUCOSE BLD STRIP.AUTO-MCNC: 103 MG/DL (ref 65–100)
GLUCOSE BLD STRIP.AUTO-MCNC: 112 MG/DL (ref 65–100)
GLUCOSE BLD STRIP.AUTO-MCNC: 126 MG/DL (ref 65–100)
GLUCOSE BLD STRIP.AUTO-MCNC: 150 MG/DL (ref 65–100)
SERVICE CMNT-IMP: ABNORMAL

## 2018-05-21 PROCEDURE — 74011000250 HC RX REV CODE- 250: Performed by: INTERNAL MEDICINE

## 2018-05-21 PROCEDURE — C1887 CATHETER, GUIDING: HCPCS

## 2018-05-21 PROCEDURE — 77030013744

## 2018-05-21 PROCEDURE — 74011250637 HC RX REV CODE- 250/637: Performed by: INTERNAL MEDICINE

## 2018-05-21 PROCEDURE — 027135Z DILATION OF CORONARY ARTERY, TWO ARTERIES WITH TWO DRUG-ELUTING INTRALUMINAL DEVICES, PERCUTANEOUS APPROACH: ICD-10-PCS | Performed by: INTERNAL MEDICINE

## 2018-05-21 PROCEDURE — 77030004532 HC CATH ANGI DX IMP BSC -A

## 2018-05-21 PROCEDURE — 74011250636 HC RX REV CODE- 250/636: Performed by: EMERGENCY MEDICINE

## 2018-05-21 PROCEDURE — 74011636637 HC RX REV CODE- 636/637: Performed by: HOSPITALIST

## 2018-05-21 PROCEDURE — 74011250636 HC RX REV CODE- 250/636: Performed by: INTERNAL MEDICINE

## 2018-05-21 PROCEDURE — 65660000000 HC RM CCU STEPDOWN

## 2018-05-21 PROCEDURE — 93459 L HRT ART/GRFT ANGIO: CPT

## 2018-05-21 PROCEDURE — C1769 GUIDE WIRE: HCPCS

## 2018-05-21 PROCEDURE — 4A023N7 MEASUREMENT OF CARDIAC SAMPLING AND PRESSURE, LEFT HEART, PERCUTANEOUS APPROACH: ICD-10-PCS | Performed by: INTERNAL MEDICINE

## 2018-05-21 PROCEDURE — 74011636320 HC RX REV CODE- 636/320: Performed by: INTERNAL MEDICINE

## 2018-05-21 PROCEDURE — 77030004533 HC CATH ANGI DX IMP BSC -B

## 2018-05-21 PROCEDURE — B2151ZZ FLUOROSCOPY OF LEFT HEART USING LOW OSMOLAR CONTRAST: ICD-10-PCS | Performed by: INTERNAL MEDICINE

## 2018-05-21 PROCEDURE — C1874 STENT, COATED/COV W/DEL SYS: HCPCS

## 2018-05-21 PROCEDURE — 93041 RHYTHM ECG TRACING: CPT

## 2018-05-21 PROCEDURE — 77030013715 HC INFL SYS MRTM -B

## 2018-05-21 PROCEDURE — 74011250637 HC RX REV CODE- 250/637: Performed by: HOSPITALIST

## 2018-05-21 PROCEDURE — 74011000258 HC RX REV CODE- 258: Performed by: INTERNAL MEDICINE

## 2018-05-21 PROCEDURE — B2111ZZ FLUOROSCOPY OF MULTIPLE CORONARY ARTERIES USING LOW OSMOLAR CONTRAST: ICD-10-PCS | Performed by: INTERNAL MEDICINE

## 2018-05-21 PROCEDURE — 82962 GLUCOSE BLOOD TEST: CPT

## 2018-05-21 PROCEDURE — C1760 CLOSURE DEV, VASC: HCPCS

## 2018-05-21 PROCEDURE — C1725 CATH, TRANSLUMIN NON-LASER: HCPCS

## 2018-05-21 PROCEDURE — 74011250636 HC RX REV CODE- 250/636

## 2018-05-21 PROCEDURE — 74011250637 HC RX REV CODE- 250/637: Performed by: NURSE PRACTITIONER

## 2018-05-21 RX ORDER — SODIUM CHLORIDE 0.9 % (FLUSH) 0.9 %
5-10 SYRINGE (ML) INJECTION AS NEEDED
Status: DISCONTINUED | OUTPATIENT
Start: 2018-05-21 | End: 2018-05-22

## 2018-05-21 RX ORDER — FENTANYL CITRATE 50 UG/ML
25 INJECTION, SOLUTION INTRAMUSCULAR; INTRAVENOUS
Status: DISCONTINUED | OUTPATIENT
Start: 2018-05-21 | End: 2018-05-23 | Stop reason: HOSPADM

## 2018-05-21 RX ORDER — EPTIFIBATIDE 0.75 MG/ML
2 INJECTION, SOLUTION INTRAVENOUS CONTINUOUS
Status: DISCONTINUED | OUTPATIENT
Start: 2018-05-21 | End: 2018-05-21 | Stop reason: ALTCHOICE

## 2018-05-21 RX ORDER — LIDOCAINE HYDROCHLORIDE 10 MG/ML
10-30 INJECTION INFILTRATION; PERINEURAL
Status: DISCONTINUED | OUTPATIENT
Start: 2018-05-21 | End: 2018-05-21 | Stop reason: ALTCHOICE

## 2018-05-21 RX ORDER — SODIUM CHLORIDE 0.9 % (FLUSH) 0.9 %
5-10 SYRINGE (ML) INJECTION EVERY 8 HOURS
Status: DISCONTINUED | OUTPATIENT
Start: 2018-05-21 | End: 2018-05-21

## 2018-05-21 RX ORDER — SODIUM CHLORIDE 0.9 % (FLUSH) 0.9 %
5-10 SYRINGE (ML) INJECTION EVERY 8 HOURS
Status: DISCONTINUED | OUTPATIENT
Start: 2018-05-21 | End: 2018-05-23

## 2018-05-21 RX ORDER — FENTANYL CITRATE 50 UG/ML
25-200 INJECTION, SOLUTION INTRAMUSCULAR; INTRAVENOUS AS NEEDED
Status: DISCONTINUED | OUTPATIENT
Start: 2018-05-21 | End: 2018-05-21 | Stop reason: ALTCHOICE

## 2018-05-21 RX ORDER — ONDANSETRON 2 MG/ML
4 INJECTION INTRAMUSCULAR; INTRAVENOUS
Status: DISCONTINUED | OUTPATIENT
Start: 2018-05-21 | End: 2018-05-21 | Stop reason: HOSPADM

## 2018-05-21 RX ORDER — SODIUM CHLORIDE 0.9 % (FLUSH) 0.9 %
5-10 SYRINGE (ML) INJECTION AS NEEDED
Status: DISCONTINUED | OUTPATIENT
Start: 2018-05-21 | End: 2018-05-21 | Stop reason: ALTCHOICE

## 2018-05-21 RX ORDER — SODIUM CHLORIDE 9 MG/ML
3 INJECTION, SOLUTION INTRAVENOUS CONTINUOUS
Status: DISPENSED | OUTPATIENT
Start: 2018-05-21 | End: 2018-05-21

## 2018-05-21 RX ORDER — FUROSEMIDE 10 MG/ML
40 INJECTION INTRAMUSCULAR; INTRAVENOUS ONCE
Status: COMPLETED | OUTPATIENT
Start: 2018-05-21 | End: 2018-05-21

## 2018-05-21 RX ORDER — MIDAZOLAM HYDROCHLORIDE 1 MG/ML
.5-1 INJECTION, SOLUTION INTRAMUSCULAR; INTRAVENOUS AS NEEDED
Status: DISCONTINUED | OUTPATIENT
Start: 2018-05-21 | End: 2018-05-21 | Stop reason: ALTCHOICE

## 2018-05-21 RX ORDER — HEPARIN SODIUM 1000 [USP'U]/ML
1000-10000 INJECTION, SOLUTION INTRAVENOUS; SUBCUTANEOUS AS NEEDED
Status: DISCONTINUED | OUTPATIENT
Start: 2018-05-21 | End: 2018-05-21 | Stop reason: ALTCHOICE

## 2018-05-21 RX ORDER — VERAPAMIL HYDROCHLORIDE 2.5 MG/ML
2.5-5 INJECTION, SOLUTION INTRAVENOUS AS NEEDED
Status: DISCONTINUED | OUTPATIENT
Start: 2018-05-21 | End: 2018-05-21 | Stop reason: ALTCHOICE

## 2018-05-21 RX ORDER — PRASUGREL 10 MG/1
30 TABLET, FILM COATED ORAL
Status: DISCONTINUED | OUTPATIENT
Start: 2018-05-21 | End: 2018-05-21 | Stop reason: ALTCHOICE

## 2018-05-21 RX ORDER — GLIPIZIDE 5 MG/1
10 TABLET ORAL 2 TIMES DAILY
Status: DISCONTINUED | OUTPATIENT
Start: 2018-05-21 | End: 2018-05-23 | Stop reason: HOSPADM

## 2018-05-21 RX ORDER — PRASUGREL 10 MG/1
10 TABLET, FILM COATED ORAL DAILY
Status: DISCONTINUED | OUTPATIENT
Start: 2018-05-22 | End: 2018-05-21 | Stop reason: SDUPTHER

## 2018-05-21 RX ORDER — FENTANYL CITRATE 50 UG/ML
50 INJECTION, SOLUTION INTRAMUSCULAR; INTRAVENOUS
Status: COMPLETED | OUTPATIENT
Start: 2018-05-21 | End: 2018-05-21

## 2018-05-21 RX ORDER — SODIUM CHLORIDE 9 MG/ML
1.5 INJECTION, SOLUTION INTRAVENOUS CONTINUOUS
Status: DISPENSED | OUTPATIENT
Start: 2018-05-21 | End: 2018-05-21

## 2018-05-21 RX ORDER — HEPARIN SODIUM 200 [USP'U]/100ML
2000 INJECTION, SOLUTION INTRAVENOUS AS NEEDED
Status: DISCONTINUED | OUTPATIENT
Start: 2018-05-21 | End: 2018-05-21 | Stop reason: ALTCHOICE

## 2018-05-21 RX ORDER — SODIUM NITROPRUSSIDE 25 MG/ML
100-400 INJECTION INTRAVENOUS AS NEEDED
Status: DISCONTINUED | OUTPATIENT
Start: 2018-05-21 | End: 2018-05-21 | Stop reason: ALTCHOICE

## 2018-05-21 RX ORDER — EPTIFIBATIDE 0.75 MG/ML
INJECTION, SOLUTION INTRAVENOUS
Status: COMPLETED
Start: 2018-05-21 | End: 2018-05-21

## 2018-05-21 RX ORDER — ATROPINE SULFATE 0.1 MG/ML
.5-1 INJECTION INTRAVENOUS AS NEEDED
Status: DISCONTINUED | OUTPATIENT
Start: 2018-05-21 | End: 2018-05-21 | Stop reason: ALTCHOICE

## 2018-05-21 RX ORDER — CLOPIDOGREL 300 MG/1
300-600 TABLET, FILM COATED ORAL AS NEEDED
Status: DISCONTINUED | OUTPATIENT
Start: 2018-05-21 | End: 2018-05-21 | Stop reason: ALTCHOICE

## 2018-05-21 RX ORDER — SODIUM CHLORIDE 0.9 % (FLUSH) 0.9 %
5-10 SYRINGE (ML) INJECTION AS NEEDED
Status: DISCONTINUED | OUTPATIENT
Start: 2018-05-21 | End: 2018-05-21

## 2018-05-21 RX ADMIN — INSULIN GLARGINE 42 UNITS: 100 INJECTION, SOLUTION SUBCUTANEOUS at 22:05

## 2018-05-21 RX ADMIN — SODIUM NITROPRUSSIDE 100 MCG: 25 INJECTION INTRAVENOUS at 18:03

## 2018-05-21 RX ADMIN — SODIUM NITROPRUSSIDE 100 MCG: 25 INJECTION INTRAVENOUS at 17:38

## 2018-05-21 RX ADMIN — MIDAZOLAM HYDROCHLORIDE 2 MG: 1 INJECTION, SOLUTION INTRAMUSCULAR; INTRAVENOUS at 17:13

## 2018-05-21 RX ADMIN — MIDAZOLAM HYDROCHLORIDE 2 MG: 1 INJECTION, SOLUTION INTRAMUSCULAR; INTRAVENOUS at 16:37

## 2018-05-21 RX ADMIN — HEPARIN SODIUM 20 UNITS/KG/HR: 10000 INJECTION, SOLUTION INTRAVENOUS at 08:00

## 2018-05-21 RX ADMIN — SODIUM CHLORIDE 1.5 ML/KG/HR: 900 INJECTION, SOLUTION INTRAVENOUS at 16:21

## 2018-05-21 RX ADMIN — Medication 10 ML: at 14:38

## 2018-05-21 RX ADMIN — NITROGLYCERIN 1 INCH: 20 OINTMENT TOPICAL at 14:43

## 2018-05-21 RX ADMIN — NITROGLYCERIN 1 INCH: 20 OINTMENT TOPICAL at 05:38

## 2018-05-21 RX ADMIN — INSULIN GLARGINE 43 UNITS: 100 INJECTION, SOLUTION SUBCUTANEOUS at 22:05

## 2018-05-21 RX ADMIN — GLIPIZIDE 10 MG: 5 TABLET ORAL at 22:05

## 2018-05-21 RX ADMIN — ACETAMINOPHEN 650 MG: 325 TABLET ORAL at 20:33

## 2018-05-21 RX ADMIN — FENTANYL CITRATE 25 MCG: 50 INJECTION, SOLUTION INTRAMUSCULAR; INTRAVENOUS at 22:12

## 2018-05-21 RX ADMIN — SODIUM CHLORIDE 3 ML/KG/HR: 900 INJECTION, SOLUTION INTRAVENOUS at 14:37

## 2018-05-21 RX ADMIN — Medication 10 ML: at 22:06

## 2018-05-21 RX ADMIN — Medication 10 ML: at 09:21

## 2018-05-21 RX ADMIN — IOPAMIDOL 350 ML: 755 INJECTION, SOLUTION INTRAVENOUS at 18:10

## 2018-05-21 RX ADMIN — FENTANYL CITRATE 50 MCG: 50 INJECTION, SOLUTION INTRAMUSCULAR; INTRAVENOUS at 19:22

## 2018-05-21 RX ADMIN — MIDAZOLAM HYDROCHLORIDE 1 MG: 1 INJECTION, SOLUTION INTRAMUSCULAR; INTRAVENOUS at 16:55

## 2018-05-21 RX ADMIN — ASPIRIN 81 MG: 81 TABLET, COATED ORAL at 08:41

## 2018-05-21 RX ADMIN — FENTANYL CITRATE 50 MCG: 50 INJECTION, SOLUTION INTRAMUSCULAR; INTRAVENOUS at 17:13

## 2018-05-21 RX ADMIN — ALPRAZOLAM 0.25 MG: 0.25 TABLET ORAL at 19:33

## 2018-05-21 RX ADMIN — SODIUM NITROPRUSSIDE 100 MCG: 25 INJECTION INTRAVENOUS at 17:56

## 2018-05-21 RX ADMIN — BIVALIRUDIN 192.5 MG/HR: 250 INJECTION, POWDER, LYOPHILIZED, FOR SOLUTION INTRAVENOUS at 17:31

## 2018-05-21 RX ADMIN — PRASUGREL HYDROCHLORIDE 10 MG: 10 TABLET, FILM COATED ORAL at 08:41

## 2018-05-21 RX ADMIN — LIDOCAINE HYDROCHLORIDE 10 ML: 10 INJECTION, SOLUTION INFILTRATION; PERINEURAL at 17:05

## 2018-05-21 RX ADMIN — PRASUGREL HYDROCHLORIDE 30 MG: 10 TABLET, FILM COATED ORAL at 18:11

## 2018-05-21 RX ADMIN — FENTANYL CITRATE 25 MCG: 50 INJECTION, SOLUTION INTRAMUSCULAR; INTRAVENOUS at 16:37

## 2018-05-21 RX ADMIN — FUROSEMIDE 40 MG: 10 INJECTION, SOLUTION INTRAMUSCULAR; INTRAVENOUS at 18:41

## 2018-05-21 RX ADMIN — DULOXETINE HYDROCHLORIDE 30 MG: 30 CAPSULE, DELAYED RELEASE ORAL at 08:41

## 2018-05-21 RX ADMIN — Medication 10 ML: at 08:42

## 2018-05-21 RX ADMIN — ATROPINE SULFATE 0.6 MG: 0.1 INJECTION, SOLUTION ENDOTRACHEAL; INTRAMUSCULAR; INTRAVENOUS; SUBCUTANEOUS at 17:38

## 2018-05-21 RX ADMIN — HEPARIN SODIUM 2000 UNITS: 200 INJECTION, SOLUTION INTRAVENOUS at 16:34

## 2018-05-21 RX ADMIN — EPTIFIBATIDE 19.8 MG: 0.75 INJECTION, SOLUTION INTRAVENOUS at 17:33

## 2018-05-21 RX ADMIN — Medication 5 ML: at 14:00

## 2018-05-21 RX ADMIN — NEBIVOLOL HYDROCHLORIDE 5 MG: 5 TABLET ORAL at 19:34

## 2018-05-21 RX ADMIN — FENTANYL CITRATE 25 MCG: 50 INJECTION, SOLUTION INTRAMUSCULAR; INTRAVENOUS at 16:55

## 2018-05-21 RX ADMIN — LISINOPRIL 20 MG: 20 TABLET ORAL at 08:41

## 2018-05-21 RX ADMIN — NITROGLYCERIN 1 INCH: 20 OINTMENT TOPICAL at 18:00

## 2018-05-21 RX ADMIN — IOPAMIDOL 50 ML: 755 INJECTION, SOLUTION INTRAVENOUS at 17:35

## 2018-05-21 NOTE — ROUTINE PROCESS
TRANSFER - OUT REPORT:    Verbal report given to Akua Showers (name) on Ciara Stout  being transferred to Cath Lab (unit) for ordered procedure       Report consisted of patients Situation, Background, Assessment and   Recommendations(SBAR). Information from the following report(s) SBAR, Kardex, Procedure Summary, MAR and Cardiac Rhythm NSR was reviewed with the receiving nurse. Lines:   Peripheral IV 05/18/18 Right Antecubital (Active)   Site Assessment Clean, dry, & intact 5/21/2018 11:20 AM   Phlebitis Assessment 0 5/21/2018 11:20 AM   Infiltration Assessment 0 5/21/2018 11:20 AM   Dressing Status Clean, dry, & intact 5/21/2018 11:20 AM   Dressing Type Transparent 5/21/2018 11:20 AM   Hub Color/Line Status Pink;Capped 5/21/2018 11:20 AM   Action Taken Open ports on tubing capped 5/21/2018  4:01 AM   Alcohol Cap Used Yes 5/21/2018 11:20 AM       Peripheral IV 05/18/18 Left Hand (Active)   Site Assessment Clean, dry, & intact 5/21/2018 11:20 AM   Phlebitis Assessment 0 5/21/2018 11:20 AM   Infiltration Assessment 0 5/21/2018 11:20 AM   Dressing Status Clean, dry, & intact 5/21/2018 11:20 AM   Dressing Type Transparent 5/21/2018 11:20 AM   Hub Color/Line Status Pink;Capped 5/21/2018 11:20 AM   Action Taken Open ports on tubing capped 5/21/2018  4:01 AM   Alcohol Cap Used Yes 5/21/2018 11:20 AM        Opportunity for questions and clarification was provided.       Patient transported with:   Registered Nurse

## 2018-05-21 NOTE — PROGRESS NOTES
Bedside shift change report given to Marcia (oncoming nurse) by Carmen Garcia (offgoing nurse). Report included the following information SBAR, Intake/Output, MAR, Recent Results and Cardiac Rhythm NSR.

## 2018-05-21 NOTE — PROGRESS NOTES
Hospitalist Progress Note  Guzman Allen NP  Answering service: [phone] from in house phone  Cell: [phone]   Date of Service:  2018  NAME:  Kandice Blas  :  1960  MRN:  [id number]    Admission Summary:   Pt presented to the ED with intermittent left-sided chest pain and shortness of breath last 2 weeks. Initially it was intermittent and relieved by rest - since the am of admit, L-sided chest pain and dyspnea was persistent with pain radiated (2/10) to L side of the arm with pressure. Pt feels this is exactly the same presentation as 2 years ago when he had his third MI. Pmhx: diabetes and acute MI, hypertension    Interval history / Subjective:   Sitting in bed, denies cp/sob  Cardiac Catherization this afternoon with Dr. Olivia Mckeon  D/c heparin drip  Counseled on smoking cessation  Monitor blood sugars carefully     Assessment & Plan:     NSTEMI with previous hx MI and stents:     -  d/c heparin drip  - continue ASA, effient, Bystolic    - not on statin due to intolerance - on Repatha  - oxygen prn/comfort  -  Cardiac cath    Hx Diabetes:    - hold glipizide  - continue Lantus, SSI    Hx Hypertension: Continue Bystolic. Hx Hyperlipidemia:    - lipid panel:  Chol 232 Hdl 24 Ldl (direct) 144  - not on statin, intolerant.   - on repatha, just started and has had two doses. Today is due date for medication, wife has brought in. Will order to give that dose today (140 mg injection). Tobacco Use: A smoker, provided quitting counseling. Start nicotine patch.   - pt still smoking 1 ppd (was on 4 ppd at one time)  -  recounseled on smoking cessation    Anxiety: BID prn    Code status: Full  DVT prophylaxis: Heparin drip stopped   Care Plan discussed with: patient, nurse and attending MD  Disposition: home when able, post cath     Hospital Problems  Never Reviewed          Codes Class Noted POA    * (Principal)NSTEMI (non-ST elevated myocardial infarction) [facility]) ICD-10-CM: I21.4  ICD-9-CM: 410.70  5/18/2018 Yes            Review of Systems:   Denies HA. Denies chest pain or pressure. Mild MILLARD. No GI complaints. Vital Signs:    Last 24hrs VS reviewed since prior progress note. Most recent are:  Visit Vitals    /65 (BP 1 Location: Left arm, BP Patient Position: At rest)    Pulse 62    Temp 98.3 °F (36.8 °C)    Resp 20    Ht 6' 2\" (1.88 m)    Wt 110 kg (242 lb 8.1 oz)    SpO2 95%    BMI 31.14 kg/m2       Intake/Output Summary (Last 24 hours) at 05/21/18 0846  Last data filed at 05/21/18 0539   Gross per 24 hour   Intake           945.56 ml   Output              900 ml   Net            45.56 ml      Physical Examination:        Constitutional:  No acute distress, cooperative, pleasant    ENT:  Oral mucous membranes moist, oropharynx benign. Resp:  CTA bilaterally. No accessory muscle use, on 2 L NC for comfort   CV:  Regular rhythm, normal rate, no murmurs. NSR on tele    GI:  Soft, non distended, non tender. Normoactive bowel sounds     Musculoskeletal:  No edema, warm, 2+ pulses throughout    Neurologic:  Moves all extremities. AAOx3  Skin: dry, intact     Data Review:   Review and/or order of clinical lab test  Review and/or order of tests in the radiology section of CPT  Review and/or order of tests in the medicine section of CPT    Labs:     Recent Labs      05/20/18   0217  05/18/18   2146   WBC  9.4  9.9   HGB  13.7  15.1   HCT  41.8  45.0   PLT  248  257     Recent Labs      05/18/18 2012   NA  138   K  3.8   CL  103   CO2  23   BUN  14   CREA  0.98   GLU  244*   CA  8.6     Recent Labs      05/18/18 2012   SGOT  23   ALT  49   AP  139*   TBILI  0.3   TP  6.8   ALB  3.5   GLOB  3.3     Recent Labs      05/20/18   1555  05/20/18   0926  05/20/18   0211   APTT  58.2*  65.1*  55.2*      No results for input(s): FE, TIBC, PSAT, FERR in the last 72 hours.    No results found for: FOL, RBCF   No results for input(s): PH, PCO2, PO2 in the last 72 hours.   Recent Labs      05/19/18   0956  05/19/18   0316  05/18/18 2012   TROIQ  0.38*  0.43*  0.49*     Lab Results   Component Value Date/Time    Cholesterol, total 232 (H) 05/20/2018 02:11 AM    HDL Cholesterol 24 05/20/2018 02:11 AM    LDL,Direct 144 (H) 05/20/2018 02:11 AM    LDL, calculated Not calculated due to elevated triglyceride level 05/20/2018 02:11 AM    Triglyceride 587 (H) 05/20/2018 02:11 AM    CHOL/HDL Ratio 9.7 (H) 05/20/2018 02:11 AM     Lab Results   Component Value Date/Time    Glucose (POC) 126 (H) 05/21/2018 06:40 AM    Glucose (POC) 182 (H) 05/20/2018 10:08 PM    Glucose (POC) 113 (H) 05/20/2018 03:59 PM    Glucose (POC) 135 (H) 05/20/2018 11:22 AM    Glucose (POC) 132 (H) 05/20/2018 06:42 AM     No results found for: COLOR, APPRN, SPGRU, REFSG, XANDER, PROTU, GLUCU, KETU, BILU, UROU, TIESHA, LEUKU, GLUKE, EPSU, BACTU, WBCU, RBCU, CASTS, UCRY    Medications Reviewed:     Current Facility-Administered Medications   Medication Dose Route Frequency    sodium chloride (NS) flush 5-10 mL  5-10 mL IntraVENous Q8H    sodium chloride (NS) flush 5-10 mL  5-10 mL IntraVENous PRN    acetaminophen (TYLENOL) tablet 650 mg  650 mg Oral Q4H PRN    ALPRAZolam (XANAX) tablet 0.25 mg  0.25 mg Oral BID PRN    aspirin delayed-release tablet 81 mg  81 mg Oral DAILY    nebivolol (BYSTOLIC) tablet 5 mg  5 mg Oral QPM    lisinopril (PRINIVIL, ZESTRIL) tablet 20 mg  20 mg Oral DAILY    DULoxetine (CYMBALTA) capsule 30 mg  30 mg Oral DAILY    nicotine (NICODERM CQ) 14 mg/24 hr patch 1 Patch  1 Patch TransDERmal DAILY    sodium chloride (NS) flush 5-10 mL  5-10 mL IntraVENous Q8H    sodium chloride (NS) flush 5-10 mL  5-10 mL IntraVENous PRN    glucose chewable tablet 16 g  4 Tab Oral PRN    dextrose (D50W) injection syrg 12.5-25 g  12.5-25 g IntraVENous PRN    glucagon (GLUCAGEN) injection 1 mg  1 mg IntraMUSCular PRN    nitroglycerin (NITROBID) 2 % ointment 1 Inch  1 Inch Topical Q6H    insulin lispro (HUMALOG) injection   SubCUTAneous AC&HS    prasugrel (EFFIENT) tablet 10 mg  10 mg Oral DAILY    insulin glargine (LANTUS) injection 42 Units (Syringe 1 of 2. Total dose=85 units)  42 Units SubCUTAneous DAILY    And    insulin glargine (LANTUS) injection 43 Units (Syringe 2 of 2.  Total dose =85 units)  43 Units SubCUTAneous DAILY   ______________________________________________________________________  EXPECTED LENGTH OF STAY: - - -  ACTUAL LENGTH OF STAY:          3               Dorys June V NP

## 2018-05-21 NOTE — PROGRESS NOTES
Cardiac Cath Lab Procedure Area Arrival Note:    Minus Lyly arrived to Cardiac Cath Lab, Procedure Area. Patient identifiers verified with NAME and DATE OF BIRTH. Procedure verified with patient. Consent forms verified. Allergies verified. Patient informed of procedure and plan of care. Questions answered with review. Patient voiced understanding of procedure and plan of care. Patient on cardiac monitor, non-invasive blood pressure, SPO2 monitor. On room air and placed on O2 @ 2 lpm via NC.  IV of normal saline on pump at 165 ml/hr. Patient status doing well without problems. Patient is A&Ox 4. Patient reports no pain. Patient medicated during procedure with orders obtained and verified by Dr. Bandar Rondon. Refer to patients Cardiac Cath Lab PROCEDURE REPORT for vital signs, assessment, status, and response during procedure, printed at end of case. Printed report on chart or scanned into chart.

## 2018-05-21 NOTE — PROGRESS NOTES
Dr Corea Peers called back for update.   Pain right chest 7/10, no pain in left lower chest wall, no nausea

## 2018-05-21 NOTE — PROGRESS NOTES
TRANSFER - OUT REPORT:    Verbal report given to [id number] [facility] on Yisel Desouza being transferred to cath lab recovery for routine progression of care       Report consisted of patients Situation, Background, Assessment and   Recommendations(SBAR). Information from the following report(s) Procedure Summary was reviewed with the receiving nurse. Opportunity for questions and clarification was provided.

## 2018-05-21 NOTE — PROGRESS NOTES
Cardiology Progress Note  2018     Admit Date: 2018  Admit Diagnosis: NSTEMI (non-ST elevated myocardial infarction) [facility])  CC: none currently    Assessment:   Principal Problem:    NSTEMI (non-ST elevated myocardial infarction) ([address] (2018)      Plan:   Feels better overall. Elevated troponins and symptoms are typical of angina for him. Cath this afternoon. Volume status:euvolemic  Renal function: stable    For other plans, see orders. Subjective: Krupa Parmar reports   Chest Pain:  [x]   none,  consistent with  []   non-cardiac   []   atypical   []   angina             [x]   none now    []      on-going  Dyspnea: [x]   none  []   at rest  []   with exertion     []   improved   []   unchanged   []   worsening  PND:       [x]   none  []   overnight    Orthopnea: [x]   none  []   improved  []   unchanged  []   worsening  Presyncope: [x]   none   []   improved    []   unchanged    []   worsening  Ambulated in hallway without symptoms  []   Yes  Ambulated in room without symptoms  []   Yes    Objective:    Physical Exam:  Overall VSSAF;    Visit Vitals    /62 (BP 1 Location: Right arm, BP Patient Position: At rest)    Pulse 69    Temp 97.9 °F (36.6 °C)    Resp 18    Ht 6' 2\" (1.88 m)    Wt 110 kg (242 lb 8.1 oz)    SpO2 98%    BMI 31.14 kg/m2     Temp (24hrs), Av °F (36.7 °C), Min:97.5 °F (36.4 °C), Max:98.7 °F (37.1 °C)    Patient Vitals for the past 8 hrs:   Pulse   18 033 69    Patient Vitals for the past 8 hrs:   Resp   18    Patient Vitals for the past 8 hrs:   BP   18 0332 119/62        Intake/Output Summary (Last 24 hours) at 18 0738  Last data filed at 18 0539   Gross per 24 hour   Intake          1216.99 ml   Output             1150 ml   Net            66.99 ml       General Appearance: Well developed, well nourished, no acute distress. Ears/Nose/Mouth/Throat:   Normal MM; anicteric.      JVP: WNL   Resp:   Lungs clear to auscultation bilaterally. Nl resp effort. Cardiovascular:  RRR, S1, S2 normal, no new murmur. No gallop or rub. Abdomen:   Soft, non-tender, bowel sounds are present. Extremities: No edema bilaterally. Skin:  Neuro: Warm and dry. A/O x3, grossly nonfocal    []      cath site intact w/o hematoma or bruit; distal pulse unchanged. Data Review:     Telemetry independently reviewed : [x]   sinus  []   chronic afib   []   par afib  []      NSVT    ECG independently reviewed:  [x]   NSR   [x]   no significant changes  []   no new ECG provided for review  Lab results reviewed as noted below. Current medications reviewed as noted below. No results for input(s): PH, PCO2, PO2 in the last 72 hours. Recent Labs      05/19/18   0956  05/19/18   0316  05/18/18 2012   TROIQ  0.38*  0.43*  0.49*     Recent Labs      05/20/18   0217  05/18/18   2146  05/18/18 2012   NA   --    --   138   K   --    --   3.8   CL   --    --   103   CO2   --    --   23   BUN   --    --   14   CREA   --    --   0.98   GLU   --    --   244*   CA   --    --   8.6   ALB   --    --   3.5   WBC  9.4  9.9  9.3   HGB  13.7  15.1  15.3   HCT  41.8  45.0  45.6   PLT  248  257  259     Recent Labs      05/18/18 2012   SGOT  23   ALT  49   AP  139*   TBILI  0.3   TP  6.8   ALB  3.5   GLOB  3.3     Recent Labs      05/20/18   1555  05/20/18   0926  05/20/18   0211   APTT  58.2*  65.1*  55.2*      No results for input(s): FE, TIBC, PSAT, FERR in the last 72 hours.    Lab Results   Component Value Date/Time    Glucose (POC) 126 (H) 05/21/2018 06:40 AM    Glucose (POC) 182 (H) 05/20/2018 10:08 PM    Glucose (POC) 113 (H) 05/20/2018 03:59 PM    Glucose (POC) 135 (H) 05/20/2018 11:22 AM    Glucose (POC) 132 (H) 05/20/2018 06:42 AM       Current Facility-Administered Medications   Medication Dose Route Frequency    sodium chloride (NS) flush 5-10 mL  5-10 mL IntraVENous Q8H    sodium chloride (NS) flush 5-10 mL  5-10 mL IntraVENous PRN    acetaminophen (TYLENOL) tablet 650 mg  650 mg Oral Q4H PRN    ALPRAZolam (XANAX) tablet 0.25 mg  0.25 mg Oral BID PRN    aspirin delayed-release tablet 81 mg  81 mg Oral DAILY    nebivolol (BYSTOLIC) tablet 5 mg  5 mg Oral QPM    lisinopril (PRINIVIL, ZESTRIL) tablet 20 mg  20 mg Oral DAILY    DULoxetine (CYMBALTA) capsule 30 mg  30 mg Oral DAILY    nicotine (NICODERM CQ) 14 mg/24 hr patch 1 Patch  1 Patch TransDERmal DAILY    sodium chloride (NS) flush 5-10 mL  5-10 mL IntraVENous Q8H    sodium chloride (NS) flush 5-10 mL  5-10 mL IntraVENous PRN    glucose chewable tablet 16 g  4 Tab Oral PRN    dextrose (D50W) injection syrg 12.5-25 g  12.5-25 g IntraVENous PRN    glucagon (GLUCAGEN) injection 1 mg  1 mg IntraMUSCular PRN    nitroglycerin (NITROBID) 2 % ointment 1 Inch  1 Inch Topical Q6H    insulin lispro (HUMALOG) injection   SubCUTAneous AC&HS    prasugrel (EFFIENT) tablet 10 mg  10 mg Oral DAILY    insulin glargine (LANTUS) injection 42 Units (Syringe 1 of 2. Total dose=85 units)  42 Units SubCUTAneous DAILY    And    insulin glargine (LANTUS) injection 43 Units (Syringe 2 of 2.  Total dose =85 units)  43 Units SubCUTAneous DAILY        Autumn Mandujano MD

## 2018-05-21 NOTE — PROGRESS NOTES
12 lead EKG completed, Dr Olivia Conception called for update. Reviewed pt's c/o \"indigestion\" sl nausea, voided 550 ml since lasix  Dr Olivia Mckeon wants pt transferred to [facility]  Bed placement called.

## 2018-05-21 NOTE — PROGRESS NOTES
Cardiac Cath Lab Recovery Arrival Note:      Minus Lyly arrived to Cardiac Cath Lab, Recovery Area. Staff introduced to patient. Patient identifiers verified with NAME and DATE OF BIRTH. Procedure verified with patient. Consent forms reviewed and signed by patient or authorized representative and verified. Allergies verified. Patient and family oriented to department. Patient and family informed of procedure and plan of care. Questions answered with review. Patient prepped for procedure, per orders from physician, prior to arrival.    Patient on cardiac monitor, non-invasive blood pressure, SPO2 monitor. On room air. Patient is A&Ox 4. Patient reports no complaints. Patient in stretcher, in low position, with side rails up, call bell within reach, patient instructed to call if assistance as needed. Patient prep in: [address], [place].    Patient family has pager # [phone]  Family in: wife in hospital.   Prep by: Andre Tillman RN

## 2018-05-21 NOTE — ROUTINE PROCESS
Bedside shift change report given to Marleny Schultz RN (oncoming nurse) by Reji Hart RN (offgoing nurse). Report included the following information SBAR, Kardex, ED Summary, Procedure Summary, Intake/Output, MAR, Recent Results, Med Rec Status and Cardiac Rhythm NSR.

## 2018-05-21 NOTE — PROCEDURES
Cardiac Catheterization Procedure Note   Patient: Jeffery Diop  MRN: [de-identified]  SSN: [id number]   YOB: 1960 Age: 60 y.o. Sex: male    Date of Procedure: 5/21/2018   Pre-procedure Diagnosis: NSTEMI  Post-procedure Diagnosis: NSTEMI  Procedure: Left Heart Cath with Bypass Grafts, PCI and SVG to RCA and PDA  :  Dr. Gudelia Tidwell MD    Assistant(s):  None  Anesthesia: Moderate Sedation   Estimated Blood Loss: Less than 10 mL   Specimens Removed: None  Findings: Severe native 3 vessel disease with occluded LAD and Cx OM1 with open LIMA to LAD and open SVG to RCA but with sequential lesion proximal and mid with 90% stenoses in proximal and mid PDA. Previous large OM2 still open. LVEF 40%. No residual post 4.0 SANTOS x 2 in SVG and two overlapping 2.5 x 18 SANTOS in PDA.  Excellent distal flow and  well tolerated  Complications: None   Implants:  None  Signed by:  Gudelia Tidwell MD  5/21/2018  6:20 PM

## 2018-05-21 NOTE — CARDIO/PULMONARY
Cardiac Rehab: MI education folder, with catheterization brochure, to bedside of Fernando Francis. Educated using teach back method. Reviewed MI diagnosis definition and purpose of intervention. Discussed risk factors for CAD to include the following: family history, elevated BMI, hyperlipidemia, hypertension, diabetes, stress, and smoking. Smoking Cessation Program link added to AVS. Discussed Heart Healthy/Low Sodium (2000 mg) diet. Reviewed the importance of medication compliance. Discussed follow up appointments with cardiologist, signs and symptoms of angina, and what to report to physician after discharge. Emphasized the value of cardiac rehab. Discussed Cardiac Rehab Program format, benefits, and encouraged enrollment to assist with risk modification and management. Patient would like to enroll at [facility]. [address] and contact information is on AVS.    Fernando Francis verbalized understanding with questions answered.  Kiera Guardado RN

## 2018-05-21 NOTE — ROUTINE PROCESS
1814:  Called [facility] to give report. TRANSFER - OUT REPORT:    Verbal report given to Edouard Stout (name) on Mitcheal Spatz  being transferred to [facility] ([facility]) for routine progression of care       Report consisted of patients Situation, Background, Assessment and   Recommendations(SBAR). Information from the following report(s) SBAR, Kardex, ED Summary, Intake/Output, MAR, Recent Results and Cardiac Rhythm NSR was reviewed with the receiving nurse. Lines:   Peripheral IV 05/18/18 Right Antecubital (Active)   Site Assessment Clean, dry, & intact 5/21/2018 11:20 AM   Phlebitis Assessment 0 5/21/2018 11:20 AM   Infiltration Assessment 0 5/21/2018 11:20 AM   Dressing Status Clean, dry, & intact 5/21/2018 11:20 AM   Dressing Type Transparent 5/21/2018 11:20 AM   Hub Color/Line Status Pink;Capped 5/21/2018 11:20 AM   Action Taken Open ports on tubing capped 5/21/2018  4:01 AM   Alcohol Cap Used Yes 5/21/2018 11:20 AM       Peripheral IV 05/18/18 Left Hand (Active)   Site Assessment Clean, dry, & intact 5/21/2018 11:20 AM   Phlebitis Assessment 0 5/21/2018 11:20 AM   Infiltration Assessment 0 5/21/2018 11:20 AM   Dressing Status Clean, dry, & intact 5/21/2018 11:20 AM   Dressing Type Transparent 5/21/2018 11:20 AM   Hub Color/Line Status Pink;Capped 5/21/2018 11:20 AM   Action Taken Open ports on tubing capped 5/21/2018  4:01 AM   Alcohol Cap Used Yes 5/21/2018 11:20 AM        Opportunity for questions and clarification was provided. Patient transported with:   Registered Nurse  Tech   Pt currently in Cath lab. Pt will transfer to [facility] from Cath Lab    Silvana PCT took all of patient's belongings in room to room 408.

## 2018-05-21 NOTE — PROGRESS NOTES
Reason for Admission:  NSTEMI                    RRAT Score:     7                Plan for utilizing home health:   Not indicated at this time                       Likelihood of Readmission:  Low                         Transition of Care Plan: CM met with patient and wife to discuss discharge planning. Patient and wife live together in their private residence, he is independent without any assistive devices and gainfully employed. He saw his PCP 3 months ago and uses his local [facility] pharmacy in [place], [place] for prescriptions. His wife will provide transportation home. He did not voice any discharge barriers. CM will follow as needed. Yariel Valles MSA, RN, CRM. Care Management Interventions  PCP Verified by CM: Yes  Palliative Care Criteria Met (RRAT>21 & CHF Dx)?: No  Mode of Transport at Discharge:  Other (see comment) (Private car)  Transition of Care Consult (CM Consult): Discharge Planning  MyChart Signup: No  Discharge Durable Medical Equipment: No  Health Maintenance Reviewed: Yes  Physical Therapy Consult: No  Occupational Therapy Consult: No  Speech Therapy Consult: No  Current Support Network: Lives with Spouse  Confirm Follow Up Transport: Family  Plan discussed with Pt/Family/Caregiver: Yes  Discharge Location  Discharge Placement: Home with family assistance

## 2018-05-21 NOTE — PROGRESS NOTES
Problem: Falls - Risk of  Goal: *Absence of Falls  Document Kecia Fall Risk and appropriate interventions in the flowsheet. Outcome: Progressing Towards Goal  Fall Risk Interventions:            Medication Interventions: Teach patient to arise slowly                  Problem: Pressure Injury - Risk of  Goal: *Prevention of pressure injury  Document Carlos Scale and appropriate interventions in the flowsheet. Outcome: Progressing Towards Goal  Pressure Injury Interventions:             Activity Interventions: Pressure redistribution bed/mattress(bed type)                              Problem: Unstable angina/NSTEMI: Day 2  Goal: Diagnostic Test/Procedures  Outcome: Progressing Towards Goal  Cath scheduled for AM  Goal: *Optimal pain control at patient's stated goal  Outcome: Progressing Towards Goal  Pt denies pain at this time

## 2018-05-22 LAB
ANION GAP SERPL CALC-SCNC: 8 MMOL/L (ref 5–15)
ATRIAL RATE: 54 BPM
ATRIAL RATE: 73 BPM
BASOPHILS # BLD: 0 K/UL (ref 0–0.1)
BASOPHILS NFR BLD: 0 % (ref 0–1)
BUN SERPL-MCNC: 15 MG/DL (ref 6–20)
BUN/CREAT SERPL: 17 (ref 12–20)
CALCIUM SERPL-MCNC: 9.2 MG/DL (ref 8.5–10.1)
CALCULATED P AXIS, ECG09: 42 DEGREES
CALCULATED P AXIS, ECG09: 48 DEGREES
CALCULATED R AXIS, ECG10: -13 DEGREES
CALCULATED R AXIS, ECG10: 11 DEGREES
CALCULATED T AXIS, ECG11: 84 DEGREES
CALCULATED T AXIS, ECG11: 87 DEGREES
CHLORIDE SERPL-SCNC: 102 MMOL/L (ref 97–108)
CHOLEST SERPL-MCNC: 251 MG/DL
CK MB CFR SERPL CALC: 10.6 % (ref 0–2.5)
CK MB CFR SERPL CALC: 11.4 % (ref 0–2.5)
CK MB SERPL-MCNC: 33.5 NG/ML (ref 5–25)
CK MB SERPL-MCNC: 42.6 NG/ML (ref 5–25)
CK SERPL-CCNC: 293 U/L (ref 39–308)
CK SERPL-CCNC: 403 U/L (ref 39–308)
CO2 SERPL-SCNC: 31 MMOL/L (ref 21–32)
CREAT SERPL-MCNC: 0.86 MG/DL (ref 0.7–1.3)
DIAGNOSIS, 93000: NORMAL
DIAGNOSIS, 93000: NORMAL
DIFFERENTIAL METHOD BLD: NORMAL
EOSINOPHIL # BLD: 0.2 K/UL (ref 0–0.4)
EOSINOPHIL NFR BLD: 2 % (ref 0–7)
ERYTHROCYTE [DISTWIDTH] IN BLOOD BY AUTOMATED COUNT: 13.3 % (ref 11.5–14.5)
GLUCOSE BLD STRIP.AUTO-MCNC: 143 MG/DL (ref 65–100)
GLUCOSE BLD STRIP.AUTO-MCNC: 175 MG/DL (ref 65–100)
GLUCOSE BLD STRIP.AUTO-MCNC: 62 MG/DL (ref 65–100)
GLUCOSE BLD STRIP.AUTO-MCNC: 63 MG/DL (ref 65–100)
GLUCOSE BLD STRIP.AUTO-MCNC: 84 MG/DL (ref 65–100)
GLUCOSE BLD STRIP.AUTO-MCNC: 98 MG/DL (ref 65–100)
GLUCOSE SERPL-MCNC: 99 MG/DL (ref 65–100)
HCT VFR BLD AUTO: 45.1 % (ref 36.6–50.3)
HDLC SERPL-MCNC: 34 MG/DL
HDLC SERPL: 7.4 {RATIO} (ref 0–5)
HGB BLD-MCNC: 14.9 G/DL (ref 12.1–17)
IMM GRANULOCYTES # BLD: 0 K/UL (ref 0–0.04)
IMM GRANULOCYTES NFR BLD AUTO: 0 % (ref 0–0.5)
LDLC SERPL CALC-MCNC: 162.2 MG/DL (ref 0–100)
LIPID PROFILE,FLP: ABNORMAL
LYMPHOCYTES # BLD: 3 K/UL (ref 0.8–3.5)
LYMPHOCYTES NFR BLD: 32 % (ref 12–49)
MCH RBC QN AUTO: 29.5 PG (ref 26–34)
MCHC RBC AUTO-ENTMCNC: 33 G/DL (ref 30–36.5)
MCV RBC AUTO: 89.3 FL (ref 80–99)
MONOCYTES # BLD: 0.7 K/UL (ref 0–1)
MONOCYTES NFR BLD: 8 % (ref 5–13)
NEUTS SEG # BLD: 5.5 K/UL (ref 1.8–8)
NEUTS SEG NFR BLD: 59 % (ref 32–75)
NRBC # BLD: 0 K/UL (ref 0–0.01)
NRBC BLD-RTO: 0 PER 100 WBC
P-R INTERVAL, ECG05: 186 MS
P-R INTERVAL, ECG05: 194 MS
PLATELET # BLD AUTO: 245 K/UL (ref 150–400)
PMV BLD AUTO: 10.1 FL (ref 8.9–12.9)
POTASSIUM SERPL-SCNC: 3.9 MMOL/L (ref 3.5–5.1)
Q-T INTERVAL, ECG07: 408 MS
Q-T INTERVAL, ECG07: 458 MS
QRS DURATION, ECG06: 112 MS
QRS DURATION, ECG06: 94 MS
QTC CALCULATION (BEZET), ECG08: 434 MS
QTC CALCULATION (BEZET), ECG08: 449 MS
RBC # BLD AUTO: 5.05 M/UL (ref 4.1–5.7)
SERVICE CMNT-IMP: ABNORMAL
SERVICE CMNT-IMP: NORMAL
SERVICE CMNT-IMP: NORMAL
SODIUM SERPL-SCNC: 141 MMOL/L (ref 136–145)
T4 FREE SERPL-MCNC: 0.9 NG/DL (ref 0.8–1.5)
TRIGL SERPL-MCNC: 274 MG/DL (ref ?–150)
TROPONIN I SERPL-MCNC: 13.1 NG/ML
TROPONIN I SERPL-MCNC: 5.99 NG/ML
TSH SERPL DL<=0.05 MIU/L-ACNC: 1.22 UIU/ML (ref 0.36–3.74)
VENTRICULAR RATE, ECG03: 54 BPM
VENTRICULAR RATE, ECG03: 73 BPM
VLDLC SERPL CALC-MCNC: 54.8 MG/DL
WBC # BLD AUTO: 9.4 K/UL (ref 4.1–11.1)

## 2018-05-22 PROCEDURE — 84484 ASSAY OF TROPONIN QUANT: CPT | Performed by: INTERNAL MEDICINE

## 2018-05-22 PROCEDURE — 74011000258 HC RX REV CODE- 258: Performed by: INTERNAL MEDICINE

## 2018-05-22 PROCEDURE — 74011250637 HC RX REV CODE- 250/637: Performed by: HOSPITALIST

## 2018-05-22 PROCEDURE — 74011250637 HC RX REV CODE- 250/637: Performed by: INTERNAL MEDICINE

## 2018-05-22 PROCEDURE — 77030013715 HC INFL SYS MRTM -B

## 2018-05-22 PROCEDURE — 74011250637 HC RX REV CODE- 250/637: Performed by: NURSE PRACTITIONER

## 2018-05-22 PROCEDURE — 82962 GLUCOSE BLOOD TEST: CPT

## 2018-05-22 PROCEDURE — 84439 ASSAY OF FREE THYROXINE: CPT | Performed by: INTERNAL MEDICINE

## 2018-05-22 PROCEDURE — 027035Z DILATION OF CORONARY ARTERY, ONE ARTERY WITH TWO DRUG-ELUTING INTRALUMINAL DEVICES, PERCUTANEOUS APPROACH: ICD-10-PCS | Performed by: INTERNAL MEDICINE

## 2018-05-22 PROCEDURE — 77010033678 HC OXYGEN DAILY

## 2018-05-22 PROCEDURE — 74011000250 HC RX REV CODE- 250: Performed by: INTERNAL MEDICINE

## 2018-05-22 PROCEDURE — 65660000000 HC RM CCU STEPDOWN

## 2018-05-22 PROCEDURE — C1874 STENT, COATED/COV W/DEL SYS: HCPCS

## 2018-05-22 PROCEDURE — 74011250636 HC RX REV CODE- 250/636: Performed by: INTERNAL MEDICINE

## 2018-05-22 PROCEDURE — 84443 ASSAY THYROID STIM HORMONE: CPT | Performed by: INTERNAL MEDICINE

## 2018-05-22 PROCEDURE — 77030013744

## 2018-05-22 PROCEDURE — 80061 LIPID PANEL: CPT | Performed by: INTERNAL MEDICINE

## 2018-05-22 PROCEDURE — C1769 GUIDE WIRE: HCPCS

## 2018-05-22 PROCEDURE — 82550 ASSAY OF CK (CPK): CPT | Performed by: INTERNAL MEDICINE

## 2018-05-22 PROCEDURE — C1760 CLOSURE DEV, VASC: HCPCS

## 2018-05-22 PROCEDURE — 74011250637 HC RX REV CODE- 250/637

## 2018-05-22 PROCEDURE — 85025 COMPLETE CBC W/AUTO DIFF WBC: CPT | Performed by: INTERNAL MEDICINE

## 2018-05-22 PROCEDURE — 77030018729 HC ELECTRD DEFIB PAD CARD -B

## 2018-05-22 PROCEDURE — 74011636637 HC RX REV CODE- 636/637: Performed by: HOSPITALIST

## 2018-05-22 PROCEDURE — 36415 COLL VENOUS BLD VENIPUNCTURE: CPT | Performed by: INTERNAL MEDICINE

## 2018-05-22 PROCEDURE — C1725 CATH, TRANSLUMIN NON-LASER: HCPCS

## 2018-05-22 PROCEDURE — 77030004533 HC CATH ANGI DX IMP BSC -B

## 2018-05-22 PROCEDURE — 99153 MOD SED SAME PHYS/QHP EA: CPT

## 2018-05-22 PROCEDURE — 80048 BASIC METABOLIC PNL TOTAL CA: CPT | Performed by: INTERNAL MEDICINE

## 2018-05-22 PROCEDURE — C1887 CATHETER, GUIDING: HCPCS

## 2018-05-22 PROCEDURE — 74011636320 HC RX REV CODE- 636/320: Performed by: INTERNAL MEDICINE

## 2018-05-22 PROCEDURE — 93005 ELECTROCARDIOGRAM TRACING: CPT

## 2018-05-22 RX ORDER — SODIUM CHLORIDE 9 MG/ML
3 INJECTION, SOLUTION INTRAVENOUS CONTINUOUS
Status: DISCONTINUED | OUTPATIENT
Start: 2018-05-22 | End: 2018-05-22

## 2018-05-22 RX ORDER — HEPARIN SODIUM 200 [USP'U]/100ML
2000 INJECTION, SOLUTION INTRAVENOUS ONCE
Status: COMPLETED | OUTPATIENT
Start: 2018-05-22 | End: 2018-05-22

## 2018-05-22 RX ORDER — SODIUM CHLORIDE 0.9 % (FLUSH) 0.9 %
5-10 SYRINGE (ML) INJECTION AS NEEDED
Status: DISCONTINUED | OUTPATIENT
Start: 2018-05-22 | End: 2018-05-23 | Stop reason: HOSPADM

## 2018-05-22 RX ORDER — HEPARIN SODIUM 1000 [USP'U]/ML
5000 INJECTION, SOLUTION INTRAVENOUS; SUBCUTANEOUS AS NEEDED
Status: DISCONTINUED | OUTPATIENT
Start: 2018-05-22 | End: 2018-05-22

## 2018-05-22 RX ORDER — NITROGLYCERIN 0.4 MG/1
0.4 TABLET SUBLINGUAL
Status: DISCONTINUED | OUTPATIENT
Start: 2018-05-22 | End: 2018-05-23 | Stop reason: HOSPADM

## 2018-05-22 RX ORDER — LIDOCAINE HYDROCHLORIDE 10 MG/ML
5-30 INJECTION INFILTRATION; PERINEURAL AS NEEDED
Status: DISCONTINUED | OUTPATIENT
Start: 2018-05-22 | End: 2018-05-22

## 2018-05-22 RX ORDER — MIDAZOLAM HYDROCHLORIDE 1 MG/ML
1-10 INJECTION, SOLUTION INTRAMUSCULAR; INTRAVENOUS
Status: DISCONTINUED | OUTPATIENT
Start: 2018-05-22 | End: 2018-05-22

## 2018-05-22 RX ORDER — SODIUM CHLORIDE 9 MG/ML
1.5 INJECTION, SOLUTION INTRAVENOUS CONTINUOUS
Status: DISCONTINUED | OUTPATIENT
Start: 2018-05-22 | End: 2018-05-23 | Stop reason: HOSPADM

## 2018-05-22 RX ORDER — SODIUM CHLORIDE 0.9 % (FLUSH) 0.9 %
5-10 SYRINGE (ML) INJECTION AS NEEDED
Status: DISCONTINUED | OUTPATIENT
Start: 2018-05-22 | End: 2018-05-22

## 2018-05-22 RX ORDER — SODIUM CHLORIDE 0.9 % (FLUSH) 0.9 %
5-10 SYRINGE (ML) INJECTION EVERY 8 HOURS
Status: DISCONTINUED | OUTPATIENT
Start: 2018-05-22 | End: 2018-05-23

## 2018-05-22 RX ORDER — VERAPAMIL HYDROCHLORIDE 2.5 MG/ML
2.5-5 INJECTION, SOLUTION INTRAVENOUS
Status: DISCONTINUED | OUTPATIENT
Start: 2018-05-22 | End: 2018-05-22

## 2018-05-22 RX ORDER — SODIUM CHLORIDE 0.9 % (FLUSH) 0.9 %
5-10 SYRINGE (ML) INJECTION EVERY 8 HOURS
Status: DISCONTINUED | OUTPATIENT
Start: 2018-05-22 | End: 2018-05-23 | Stop reason: HOSPADM

## 2018-05-22 RX ORDER — SODIUM CHLORIDE 9 MG/ML
1.5 INJECTION, SOLUTION INTRAVENOUS CONTINUOUS
Status: DISCONTINUED | OUTPATIENT
Start: 2018-05-22 | End: 2018-05-22

## 2018-05-22 RX ORDER — FENTANYL CITRATE 50 UG/ML
25-200 INJECTION, SOLUTION INTRAMUSCULAR; INTRAVENOUS
Status: DISCONTINUED | OUTPATIENT
Start: 2018-05-22 | End: 2018-05-22

## 2018-05-22 RX ORDER — NITROGLYCERIN 20 MG/100ML
0-20 INJECTION INTRAVENOUS
Status: DISCONTINUED | OUTPATIENT
Start: 2018-05-22 | End: 2018-05-23 | Stop reason: HOSPADM

## 2018-05-22 RX ORDER — PRASUGREL 10 MG/1
30 TABLET, FILM COATED ORAL ONCE
Status: COMPLETED | OUTPATIENT
Start: 2018-05-22 | End: 2018-05-22

## 2018-05-22 RX ORDER — SODIUM CHLORIDE 0.9 % (FLUSH) 0.9 %
10 SYRINGE (ML) INJECTION AS NEEDED
Status: DISCONTINUED | OUTPATIENT
Start: 2018-05-22 | End: 2018-05-22

## 2018-05-22 RX ORDER — SODIUM CHLORIDE 9 MG/ML
3 INJECTION, SOLUTION INTRAVENOUS CONTINUOUS
Status: DISCONTINUED | OUTPATIENT
Start: 2018-05-22 | End: 2018-05-23 | Stop reason: HOSPADM

## 2018-05-22 RX ORDER — ATROPINE SULFATE 0.1 MG/ML
0.4 INJECTION INTRAVENOUS AS NEEDED
Status: DISCONTINUED | OUTPATIENT
Start: 2018-05-22 | End: 2018-05-22

## 2018-05-22 RX ADMIN — NITROGLYCERIN 1 INCH: 20 OINTMENT TOPICAL at 13:31

## 2018-05-22 RX ADMIN — MIDAZOLAM HYDROCHLORIDE 1 MG: 1 INJECTION, SOLUTION INTRAMUSCULAR; INTRAVENOUS at 14:47

## 2018-05-22 RX ADMIN — FENTANYL CITRATE 25 MCG: 50 INJECTION, SOLUTION INTRAMUSCULAR; INTRAVENOUS at 15:20

## 2018-05-22 RX ADMIN — GLIPIZIDE 10 MG: 5 TABLET ORAL at 09:18

## 2018-05-22 RX ADMIN — MIDAZOLAM HYDROCHLORIDE 1 MG: 1 INJECTION, SOLUTION INTRAMUSCULAR; INTRAVENOUS at 14:36

## 2018-05-22 RX ADMIN — Medication 10 ML: at 06:00

## 2018-05-22 RX ADMIN — ACETAMINOPHEN 650 MG: 325 TABLET ORAL at 11:54

## 2018-05-22 RX ADMIN — NITROGLYCERIN 0 MCG/MIN: 20 INJECTION INTRAVENOUS at 14:07

## 2018-05-22 RX ADMIN — INSULIN GLARGINE 43 UNITS: 100 INJECTION, SOLUTION SUBCUTANEOUS at 21:28

## 2018-05-22 RX ADMIN — NEBIVOLOL HYDROCHLORIDE 5 MG: 5 TABLET ORAL at 17:24

## 2018-05-22 RX ADMIN — BIVALIRUDIN 1.75 MG/KG/HR: 250 INJECTION, POWDER, LYOPHILIZED, FOR SOLUTION INTRAVENOUS at 15:14

## 2018-05-22 RX ADMIN — LISINOPRIL 20 MG: 20 TABLET ORAL at 09:17

## 2018-05-22 RX ADMIN — FENTANYL CITRATE 25 MCG: 50 INJECTION, SOLUTION INTRAMUSCULAR; INTRAVENOUS at 14:47

## 2018-05-22 RX ADMIN — IOPAMIDOL 50 ML: 755 INJECTION, SOLUTION INTRAVENOUS at 14:57

## 2018-05-22 RX ADMIN — SODIUM CHLORIDE 3 ML/KG/HR: 900 INJECTION, SOLUTION INTRAVENOUS at 14:22

## 2018-05-22 RX ADMIN — MIDAZOLAM HYDROCHLORIDE 1 MG: 1 INJECTION, SOLUTION INTRAMUSCULAR; INTRAVENOUS at 15:20

## 2018-05-22 RX ADMIN — PRASUGREL 30 MG: 10 TABLET, FILM COATED ORAL at 15:21

## 2018-05-22 RX ADMIN — INSULIN LISPRO 2 UNITS: 100 INJECTION, SOLUTION INTRAVENOUS; SUBCUTANEOUS at 11:55

## 2018-05-22 RX ADMIN — SODIUM CHLORIDE 1.5 ML/KG/HR: 900 INJECTION, SOLUTION INTRAVENOUS at 15:28

## 2018-05-22 RX ADMIN — Medication 10 ML: at 14:10

## 2018-05-22 RX ADMIN — IOPAMIDOL 213 ML: 755 INJECTION, SOLUTION INTRAVENOUS at 15:20

## 2018-05-22 RX ADMIN — FENTANYL CITRATE 25 MCG: 50 INJECTION, SOLUTION INTRAMUSCULAR; INTRAVENOUS at 04:06

## 2018-05-22 RX ADMIN — GLIPIZIDE 10 MG: 5 TABLET ORAL at 17:24

## 2018-05-22 RX ADMIN — ALPRAZOLAM 0.25 MG: 0.25 TABLET ORAL at 21:36

## 2018-05-22 RX ADMIN — PRASUGREL HYDROCHLORIDE 10 MG: 10 TABLET, FILM COATED ORAL at 09:18

## 2018-05-22 RX ADMIN — Medication 10 ML: at 21:29

## 2018-05-22 RX ADMIN — INSULIN GLARGINE 42 UNITS: 100 INJECTION, SOLUTION SUBCUTANEOUS at 21:28

## 2018-05-22 RX ADMIN — FENTANYL CITRATE 25 MCG: 50 INJECTION, SOLUTION INTRAMUSCULAR; INTRAVENOUS at 14:36

## 2018-05-22 RX ADMIN — LIDOCAINE HYDROCHLORIDE 10 ML: 10 INJECTION, SOLUTION INFILTRATION; PERINEURAL at 14:36

## 2018-05-22 RX ADMIN — ASPIRIN 81 MG: 81 TABLET, COATED ORAL at 09:17

## 2018-05-22 RX ADMIN — MIDAZOLAM HYDROCHLORIDE 2 MG: 1 INJECTION, SOLUTION INTRAMUSCULAR; INTRAVENOUS at 14:29

## 2018-05-22 RX ADMIN — BIVALIRUDIN 1.75 MG/KG/HR: 250 INJECTION, POWDER, LYOPHILIZED, FOR SOLUTION INTRAVENOUS at 14:52

## 2018-05-22 RX ADMIN — HEPARIN SODIUM 2000 UNITS: 200 INJECTION, SOLUTION INTRAVENOUS at 14:29

## 2018-05-22 RX ADMIN — FENTANYL CITRATE 25 MCG: 50 INJECTION, SOLUTION INTRAMUSCULAR; INTRAVENOUS at 14:30

## 2018-05-22 RX ADMIN — DULOXETINE HYDROCHLORIDE 30 MG: 30 CAPSULE, DELAYED RELEASE ORAL at 09:18

## 2018-05-22 NOTE — PROGRESS NOTES
1545 Bedside and Verbal shift change report given to DONITA Alonso (oncoming nurse) by Ebony Webber (offgoing nurse). Report included the following information SBAR, Kardex, Procedure Summary, Intake/Output, MAR, Recent Results and Cardiac Rhythm NSR/SB. 1645 TRANSFER - IN REPORT:    Verbal report received from DONITA Dial(name) on [facility]  being received from cath lab(unit) for routine post - op      Report consisted of patients Situation, Background, Assessment and   Recommendations(SBAR). Information from the following report(s) SBAR, Kardex, Procedure Summary, Intake/Output, MAR, Recent Results and Cardiac Rhythm NSR/SB was reviewed with the receiving nurse. Opportunity for questions and clarification was provided. Assessment completed upon patients arrival to unit and care assumed. [id number] Patient arrived on unit via stretcher from cath lab. VSS. Tele placed and verified with monitor tech. Left groin site assessed, dressing is clean, dry and intact with no bleeding and no hematoma. Patient remains on bedrest until 2200.     1930 Bedside and Verbal shift change report given to Carlos Phillip (oncoming nurse) by Mirtha Merino (offgoing nurse). Report included the following information SBAR, Kardex, Procedure Summary, Intake/Output, MAR, Recent Results and Cardiac Rhythm NSR/SB.

## 2018-05-22 NOTE — PROGRESS NOTES
2045 TRANSFER - IN REPORT:    Verbal report received from Barry Ying [place] (name) on Pedro Gutierrez  being received from Cath lab (unit) for routine progression of care      Report consisted of patients Situation, Background, Assessment and   Recommendations(SBAR). Information from the following report(s) SBAR, Intake/Output, Recent Results and Cardiac Rhythm NSR was reviewed with the receiving nurse. Opportunity for questions and clarification was provided. Assessment completed upon patients arrival to unit and care assumed. Tele placed and verified with MT.  VSS. R groin site is clean, dry, and intact. No bleeding, no hematoma. Will continue to monitor. 2330 Pt reporting the return of 7/10 chest pain. Reports \"it feels like heartburn\". Will page MD.   2335 Dr. Cj Riggins MD returned paged. Orders received (refer to [facility]). Will continue to monitor. 0645 Pt blood glucose 63. Provided 8oz of regular soda. Repeat blood glucose 62. Provided another 8oz regular soda. Repeat blood glucose 98.    0730 Bedside shift change report given to [facility], RN (oncoming nurse) by Tristian Lindquist RN (offgoing nurse). Report included the following information SBAR, Intake/Output, Recent Results and Cardiac Rhythm NSR. Problem: Falls - Risk of  Goal: *Absence of Falls  Document Kecia Fall Risk and appropriate interventions in the flowsheet. Outcome: Progressing Towards Goal  Fall Risk Interventions:  Medication Interventions: Teach patient to arise slowly, Patient to call before getting OOB        Problem: Pressure Injury - Risk of  Goal: *Prevention of pressure injury  Document Carlos Scale and appropriate interventions in the flowsheet. Pressure Injury Interventions:   Activity Interventions: Pressure redistribution bed/mattress(bed type)

## 2018-05-22 NOTE — PROGRESS NOTES
TRANSFER - IN REPORT:    Verbal report received from [facility] on Kandice Blas  being received from procedure for routine progression of care. Report consisted of patients Situation, Background, Assessment and Recommendations(SBAR). Information from the following report(s) Procedure Summary, MAR, Recent Results and Med Rec Status was reviewed with the receiving clinician. Opportunity for questions and clarification was provided. Assessment completed upon patients arrival to [address] [place] [address] and care assumed. Cardiac Cath Lab Recovery Arrival Note:    Kandice Blas arrived to [facility] recovery area. Patient procedure= LHC. Patient on cardiac monitor, non-invasive blood pressure, SPO2 monitor. On  O2 @ 2 lpm via n/c. IV  of nacl on pump at 165 ml/hr. Patient status doing well without problems. Patient is A&Ox 4. Patient reports right and left chest pain, Dr Olivia Mckeon aware. PROCEDURE SITE CHECK:    Procedure site:without any bleeding and or hematoma, no pain/discomfort reported at procedure site. No change in patient status. Continue to monitor patient and status.     [address] [facility] [address] Olivia Mckeon in to see pt, talked with pt and wife

## 2018-05-22 NOTE — PROGRESS NOTES
Cardiology Progress Note  2018     Admit Date: 2018  Admit Diagnosis: NSTEMI (non-ST elevated myocardial infarction) [facility])  CC: none currently    Assessment:   Principal Problem:    NSTEMI (non-ST elevated myocardial infarction) ([facility] [address] (2018)      Plan:   Had post procedure chest pain and element of CHF without EKG changes. Much improved this AM.  Some minimal Lt upper chest soreness. Biomarkers bumped. Ambulate and keep in hospital today. Work on smoking cessation. Volume status:euvolemic  Renal function: stable    For other plans, see orders.   Subjective: Amy Hermelinda reports   Chest Pain:  []   none,  consistent with  []   non-cardiac   [x]   atypical   []   angina             []   none now    [x]      on-going, minimal and resolving Dyspnea: [x]   none  []   at rest  []   with exertion     []   improved   []   unchanged   []   worsening  PND:       [x]   none  []   overnight    Orthopnea: [x]   none  []   improved  []   unchanged  []   worsening  Presyncope: [x]   none   []   improved    []   unchanged    []   worsening  Ambulated in hallway without symptoms  []   Yes  Ambulated in room without symptoms  []   Yes    Objective:    Physical Exam:  Overall VSSAF;    Visit Vitals    /63 (BP 1 Location: Right arm, BP Patient Position: At rest)    Pulse (!) 56    Temp 97.9 °F (36.6 °C)    Resp 16    Ht 6' 2\" (1.88 m)    Wt 109.4 kg (241 lb 2.9 oz)    SpO2 97%    BMI 30.97 kg/m2     Temp (24hrs), Av.2 °F (36.8 °C), Min:97.8 °F (36.6 °C), Max:98.7 °F (37.1 °C)    Patient Vitals for the past 8 hrs:   Pulse   18 0749 (!) 56   18 0353 63    Patient Vitals for the past 8 hrs:   Resp   18 0749 16   18 0353 16    Patient Vitals for the past 8 hrs:   BP   18 0749 114/63   18 0353 113/62        Intake/Output Summary (Last 24 hours) at 18 0846  Last data filed at 18 2315   Gross per 24 hour   Intake           352.13 ml   Output 3450 ml   Net         -3097.87 ml       General Appearance: Well developed, well nourished, no acute distress. Ears/Nose/Mouth/Throat:   Normal MM; anicteric. JVP: WNL   Resp:   Lungs clear to auscultation bilaterally. Nl resp effort. Cardiovascular:  RRR, S1, S2 normal, no new murmur. No gallop or rub. Abdomen:   Soft, non-tender, bowel sounds are present. Extremities: No edema bilaterally. Skin:  Neuro: Warm and dry. A/O x3, grossly nonfocal    [x]      cath site intact w/o hematoma or bruit; distal pulse unchanged. Data Review:     Telemetry independently reviewed : [x]   sinus  []   chronic afib   []   par afib  []      NSVT    ECG independently reviewed:  [x]   NSR   [x]   no significant changes  []   no new ECG provided for review  Lab results reviewed as noted below. Current medications reviewed as noted below. No results for input(s): PH, PCO2, PO2 in the last 72 hours. Recent Labs      05/22/18   0355  05/19/18   0956   CPK  293   --    CKMB  33.5*   --    TROIQ  5.99*  0.38*     Recent Labs      05/22/18   0355  05/20/18   0217   NA  141   --    K  3.9   --    CL  102   --    CO2  31   --    BUN  15   --    CREA  0.86   --    GLU  99   --    CA  9.2   --    WBC  9.4  9.4   HGB  14.9  13.7   HCT  45.1  41.8   PLT  245  248     No results for input(s): SGOT, GPT, ALT, AP, TBIL, TBILI, TP, ALB, GLOB, GGT, AML, LPSE in the last 72 hours. No lab exists for component: AMYP, HLPSE  Recent Labs      05/20/18   1555  05/20/18   0926  05/20/18   0211   APTT  58.2*  65.1*  55.2*      No results for input(s): FE, TIBC, PSAT, FERR in the last 72 hours.    Lab Results   Component Value Date/Time    Glucose (POC) 98 05/22/2018 07:25 AM    Glucose (POC) 62 (L) 05/22/2018 07:03 AM    Glucose (POC) 63 (L) 05/22/2018 06:46 AM    Glucose (POC) 150 (H) 05/21/2018 09:27 PM    Glucose (POC) 103 (H) 05/21/2018 07:47 PM       Current Facility-Administered Medications   Medication Dose Route Frequency    . PHARMACY TO SUBSTITUTE PER PROTOCOL    Per Protocol    . PHARMACY TO SUBSTITUTE PER PROTOCOL    Per Protocol    glipiZIDE (GLUCOTROL) tablet 10 mg  10 mg Oral BID    sodium chloride (NS) flush 5-10 mL  5-10 mL IntraVENous Q8H    sodium chloride (NS) flush 5-10 mL  5-10 mL IntraVENous PRN    fentaNYL citrate (PF) injection 25 mcg  25 mcg IntraVENous Q3H PRN    acetaminophen (TYLENOL) tablet 650 mg  650 mg Oral Q4H PRN    ALPRAZolam (XANAX) tablet 0.25 mg  0.25 mg Oral BID PRN    aspirin delayed-release tablet 81 mg  81 mg Oral DAILY    nebivolol (BYSTOLIC) tablet 5 mg  5 mg Oral QPM    lisinopril (PRINIVIL, ZESTRIL) tablet 20 mg  20 mg Oral DAILY    DULoxetine (CYMBALTA) capsule 30 mg  30 mg Oral DAILY    nicotine (NICODERM CQ) 14 mg/24 hr patch 1 Patch  1 Patch TransDERmal DAILY    sodium chloride (NS) flush 5-10 mL  5-10 mL IntraVENous Q8H    sodium chloride (NS) flush 5-10 mL  5-10 mL IntraVENous PRN    glucose chewable tablet 16 g  4 Tab Oral PRN    dextrose (D50W) injection syrg 12.5-25 g  12.5-25 g IntraVENous PRN    glucagon (GLUCAGEN) injection 1 mg  1 mg IntraMUSCular PRN    insulin lispro (HUMALOG) injection   SubCUTAneous AC&HS    prasugrel (EFFIENT) tablet 10 mg  10 mg Oral DAILY    insulin glargine (LANTUS) injection 42 Units (Syringe 1 of 2. Total dose=85 units)  42 Units SubCUTAneous DAILY    And    insulin glargine (LANTUS) injection 43 Units (Syringe 2 of 2.  Total dose =85 units)  43 Units SubCUTAneous DAILY        Autumn Mandujano MD

## 2018-05-22 NOTE — PROGRESS NOTES
TRANSFER - IN REPORT:    Verbal report received from Aure Delvalle on Sheronine Wan  being received from procedure area for routine progression of care. Report consisted of patients Situation, Background, Assessment and Recommendations(SBAR). Information from the following report(s) SBAR, Procedure Summary, MAR and Cardiac Rhythm NSR was reviewed with the receiving clinician. Opportunity for questions and clarification was provided. Assessment completed upon patients arrival to [address] [place] [address] and care assumed. Cardiac Cath Lab Recovery Arrival Note:    Kandice Blas arrived to [facility] recovery area. Patient procedure= LHC/Stents. Patient on cardiac monitor, non-invasive blood pressure, SPO2 monitor. On Room Air . IV  of NS on pump at 164 ml/hr. Patient status doing well without problems. Patient is A&Ox 4. Patient reports No Pain. PROCEDURE SITE CHECK:    Procedure site:without any bleeding and No Hematoma, No pain/discomfort reported at procedure site. No change in patient status. Continue to monitor patient and status.

## 2018-05-22 NOTE — PROGRESS NOTES
1422 -   Cardiac Cath Lab Procedure Area Arrival Note:    Vlad Magdaleno arrived to Cardiac Cath Lab, Procedure Area. Patient identifiers verified with NAME and DATE OF BIRTH. Procedure verified with patient. Consent forms verified. Allergies verified. Patient informed of procedure and plan of care. Questions answered with review. Patient voiced understanding of procedure and plan of care. Patient on cardiac monitor, non-invasive blood pressure, SPO2 monitor. Placed on O2 @ 2 lpm via NC.  IV of NS on pump at 328 ml/hr. Patient status doing well without problems. Patient is A&Ox 4. Patient reports 4/10 chest discomfort. Patient medicated during procedure with orders obtained and verified by Dr. Linda Vital. Refer to patients Cardiac Cath Lab PROCEDURE REPORT for vital signs, assessment, status, and response during procedure, printed at end of case. Printed report on chart or scanned into chart. 1531 - TRANSFER - OUT REPORT:    Verbal report given to [facility]) on Vlad Magdaleno  being transferred to (unit) for routine post - op       Report consisted of patients Situation, Background, Assessment and   Recommendations(SBAR). Information from the following report(s) Procedure Summary and MAR was reviewed with the receiving nurse. Lines:   Peripheral IV 05/18/18 Right Antecubital (Active)   Site Assessment Clean, dry, & intact 5/21/2018  8:46 PM   Phlebitis Assessment 0 5/21/2018  8:46 PM   Infiltration Assessment 0 5/21/2018  8:46 PM   Dressing Status Clean, dry, & intact 5/21/2018  8:46 PM   Dressing Type Tape;Transparent 5/21/2018  8:46 PM   Hub Color/Line Status Pink;Patent; Flushed;Capped 5/21/2018  8:46 PM   Action Taken Open ports on tubing capped 5/21/2018  8:46 PM   Alcohol Cap Used Yes 5/21/2018  8:46 PM       Peripheral IV 05/18/18 Left Hand (Active)   Site Assessment Clean, dry, & intact 5/21/2018  8:46 PM   Phlebitis Assessment 0 5/21/2018  8:46 PM   Infiltration Assessment 0 5/21/2018  8:46 PM   Dressing Status Clean, dry, & intact 5/21/2018  8:46 PM   Dressing Type Tape;Transparent 5/21/2018  8:46 PM   Hub Color/Line Status Pink;Flushed;Patent;Capped 5/21/2018  8:46 PM   Action Taken Open ports on tubing capped 5/21/2018  8:46 PM   Alcohol Cap Used Yes 5/21/2018  8:46 PM        Opportunity for questions and clarification was provided.       Patient transported with:   [employer]

## 2018-05-22 NOTE — PROGRESS NOTES
Hospitalist Progress Note   CRISTELA Selby  Answering service: [phone] from in house phone  Cell: [phone]   Date of Service:  2018  NAME:  [place]  :  1960  MRN:  [id number]    Admission Summary:   Pt presented to the ED with intermittent left-sided chest pain and shortness of breath last 2 weeks. Initially it was intermittent and relieved by rest - since the am of admit, L-sided chest pain and dyspnea was persistent with pain radiated (2/10) to L side of the arm with pressure. Pt feels this is exactly the same presentation as 2 years ago when he had his third MI. Pmhx: diabetes and acute MI, hypertension    Interval history / Subjective:   S/p cath yesterday, some chest pain/pressure afterwards with bump in troponin to 5.99  Pt sitting in chair this am in nad. Denies cp/sob at present  Counseled again on lifestyle changes with diet and smoking cessation     Assessment & Plan:     NSTEMI with previous hx MI and stents:     -  d/c heparin drip  - continue ASA, effient, Bystolic    - not on statin due to intolerance - on Repatha  - oxygen prn/comfort  -  Cardiac cath: Severe native 3 vessel disease with occluded LAD and Cx OM1 with open LIMA to LAD and open SVG to RCA but with sequential lesion proximal and mid with 90% stenoses in proximal and mid PDA. Hx Diabetes:    -  glipizide added back last night, which accounts for some of the lower blood sugars  - continue Lantus, SSI    Hx Hypertension: Continue Bystolic.     Hx Hyperlipidemia:    - lipid panel:  Chol 232 Hdl 24 Ldl (direct) 144  - not on statin, intolerant.   - on repatha 140 mg sq q2 weeks    Tobacco Use:   - pt still smoking 1 ppd (was on 4 ppd at one time)  -  recounseled on smoking cessation    Anxiety: Xanax BID prn    Code status: Full  DVT prophylaxis: Heparin drip stopped   Care Plan discussed with: patient, nurse and attending MD  Disposition: home when able, possible on 5/23     Hospital Problems  Never Reviewed          Codes Class Noted POA    * (Principal)NSTEMI (non-ST elevated myocardial infarction) [facility]) ICD-10-CM: I21.4  ICD-9-CM: 410.70  5/18/2018 Yes            Review of Systems:   Denies HA. Denies chest pain or pressure. Mild MILLARD. No GI complaints. Vital Signs:    Last 24hrs VS reviewed since prior progress note. Most recent are:  Visit Vitals    /63 (BP 1 Location: Right arm, BP Patient Position: At rest)    Pulse 63    Temp 97.9 °F (36.6 °C)    Resp 16    Ht 6' 2\" (1.88 m)    Wt 109.4 kg (241 lb 2.9 oz)    SpO2 97%    BMI 30.97 kg/m2       Intake/Output Summary (Last 24 hours) at 05/22/18 4367  Last data filed at 05/21/18 2315   Gross per 24 hour   Intake           352.13 ml   Output             3450 ml   Net         -3097.87 ml      Physical Examination:        Constitutional:  No acute distress, cooperative, pleasant    ENT:  Oral mucous membranes moist, oropharynx benign. Resp:  CTA bilaterally. No accessory muscle use   CV:  Regular rhythm, normal rate, no murmurs. NSR on tele    GI:  Soft, non distended, non tender. Normoactive bowel sounds     Musculoskeletal:  No edema, warm, 2+ pulses throughout    Neurologic:  Moves all extremities. AAOx3  Skin: dry, intact     Data Review:   Review and/or order of clinical lab test  Review and/or order of tests in the radiology section of CPT  Review and/or order of tests in the medicine section of CPT    Labs:     Recent Labs      05/22/18   0355  05/20/18   0217   WBC  9.4  9.4   HGB  14.9  13.7   HCT  45.1  41.8   PLT  245  248     Recent Labs      05/22/18   0355   NA  141   K  3.9   CL  102   CO2  31   BUN  15   CREA  0.86   GLU  99   CA  9.2     No results for input(s): SGOT, GPT, ALT, AP, TBIL, TBILI, TP, ALB, GLOB, GGT, AML, LPSE in the last 72 hours.     No lab exists for component: AMYP, HLPSE  Recent Labs      05/20/18   7912 05/20/18   0926  05/20/18   0211   APTT  58.2*  65.1*  55.2*      No results for input(s): FE, TIBC, PSAT, FERR in the last 72 hours. No results found for: FOL, RBCF   No results for input(s): PH, PCO2, PO2 in the last 72 hours. Recent Labs      05/22/18   0355  05/19/18   0956   CPK  293   --    CKNDX  11.4*   --    TROIQ  5.99*  0.38*     Lab Results   Component Value Date/Time    Cholesterol, total 251 (H) 05/22/2018 03:55 AM    HDL Cholesterol 34 05/22/2018 03:55 AM    LDL,Direct 144 (H) 05/20/2018 02:11 AM    LDL, calculated 162.2 (H) 05/22/2018 03:55 AM    Triglyceride 274 (H) 05/22/2018 03:55 AM    CHOL/HDL Ratio 7.4 (H) 05/22/2018 03:55 AM     Lab Results   Component Value Date/Time    Glucose (POC) 98 05/22/2018 07:25 AM    Glucose (POC) 62 (L) 05/22/2018 07:03 AM    Glucose (POC) 63 (L) 05/22/2018 06:46 AM    Glucose (POC) 150 (H) 05/21/2018 09:27 PM    Glucose (POC) 103 (H) 05/21/2018 07:47 PM     No results found for: COLOR, APPRN, SPGRU, REFSG, XANDER, PROTU, GLUCU, KETU, BILU, UROU, TIESHA, LEUKU, GLUKE, EPSU, BACTU, WBCU, RBCU, CASTS, UCRY    Medications Reviewed:     Current Facility-Administered Medications   Medication Dose Route Frequency    . PHARMACY TO SUBSTITUTE PER PROTOCOL    Per Protocol    . PHARMACY TO SUBSTITUTE PER PROTOCOL    Per Protocol    glipiZIDE (GLUCOTROL) tablet 10 mg  10 mg Oral BID    sodium chloride (NS) flush 5-10 mL  5-10 mL IntraVENous Q8H    sodium chloride (NS) flush 5-10 mL  5-10 mL IntraVENous PRN    fentaNYL citrate (PF) injection 25 mcg  25 mcg IntraVENous Q3H PRN    acetaminophen (TYLENOL) tablet 650 mg  650 mg Oral Q4H PRN    ALPRAZolam (XANAX) tablet 0.25 mg  0.25 mg Oral BID PRN    aspirin delayed-release tablet 81 mg  81 mg Oral DAILY    nebivolol (BYSTOLIC) tablet 5 mg  5 mg Oral QPM    lisinopril (PRINIVIL, ZESTRIL) tablet 20 mg  20 mg Oral DAILY    DULoxetine (CYMBALTA) capsule 30 mg  30 mg Oral DAILY    nicotine (NICODERM CQ) 14 mg/24 hr patch 1 Patch  1 Patch TransDERmal DAILY    sodium chloride (NS) flush 5-10 mL  5-10 mL IntraVENous Q8H    sodium chloride (NS) flush 5-10 mL  5-10 mL IntraVENous PRN    glucose chewable tablet 16 g  4 Tab Oral PRN    dextrose (D50W) injection syrg 12.5-25 g  12.5-25 g IntraVENous PRN    glucagon (GLUCAGEN) injection 1 mg  1 mg IntraMUSCular PRN    insulin lispro (HUMALOG) injection   SubCUTAneous AC&HS    prasugrel (EFFIENT) tablet 10 mg  10 mg Oral DAILY    insulin glargine (LANTUS) injection 42 Units (Syringe 1 of 2. Total dose=85 units)  42 Units SubCUTAneous DAILY    And    insulin glargine (LANTUS) injection 43 Units (Syringe 2 of 2.  Total dose =85 units)  43 Units SubCUTAneous DAILY   ______________________________________________________________________  EXPECTED LENGTH OF STAY: 1d 21h  ACTUAL LENGTH OF STAY:          4               Doyrs June V, NP

## 2018-05-22 NOTE — PROCEDURES
Cardiac Catheterization Procedure Note   Patient: Jeffery Diop  MRN: [de-identified]  SSN: [id number]   YOB: 1960 Age: 60 y.o. Sex: male    Date of Procedure: 5/22/2018   Pre-procedure Diagnosis: NSTEMI  Post-procedure Diagnosis: Coronary Artery Disease  Procedure: PCI and to circumflex  :  Dr. Gudelia Tidwell MD    Assistant(s):  None  Anesthesia: Moderate Sedation   Estimated Blood Loss: Less than 10 mL   Specimens Removed: None  Findings: Open SVG to distal RCA with SUNIL III flow and no residual narrowing at all stented sites, no new lesion. Large Cx OM2 continues to have sequential stenoses mid and distal with up to 95% narrowing in some frames. Treated with 2.5 x 15 and 2.75 x 22 SANTOS from distal to mid with resolution of CP and flow increased from SUNIL II to III. Overall vessel size is markedly increased.   Complications: None   Implants:  None  Signed by:  Gudelia Tidwell MD  5/22/2018  3:29 PM

## 2018-05-22 NOTE — PROGRESS NOTES
TRANSFER - OUT REPORT:    Verbal report given to [address] on Abimbola Richard being transferred to Room 455 for routine progression of care       Report consisted of patients Situation, Background, Assessment and   Recommendations(SBAR). Information from the following report(s) SBAR, Procedure Summary, MAR and Cardiac Rhythm NSR was reviewed with the receiving nurse. Opportunity for questions and clarification was provided.

## 2018-05-22 NOTE — CARDIO/PULMONARY
Cardiac Rehab: MI education folder is at the bedside of Sherryl Cogan. Met with the pt. and his wife to reinforce education. Educated using teach back method. Reviewed CAD diagnosis definition and purpose of intervention. Discussed risk factors for CAD to include the following: family history, elevated BMI, hyperlipidemia, hypertension, diabetes, stress, and smoking. Smoking Cessation Program link added to AVS. Discussed Heart Healthy/Low Sodium (2000 mg) diet. Reviewed the importance of medication compliance, the purpose of effient, and potential side effects. Discussed follow up appointments with cardiologist, signs and symptoms of angina, and what to report to physician after discharge. Emphasized the value of cardiac rehab. Discussed Cardiac Rehab Program format, benefits, and encouraged enrollment to assist with risk modification and management. The contact information for the [facility] is on his AVS.    Sherryl Cogan verbalized understanding with questions answered.  Roselia Tyson RN

## 2018-05-23 VITALS
BODY MASS INDEX: 31.21 KG/M2 | WEIGHT: 243.17 LBS | HEART RATE: 61 BPM | SYSTOLIC BLOOD PRESSURE: 114 MMHG | RESPIRATION RATE: 18 BRPM | DIASTOLIC BLOOD PRESSURE: 70 MMHG | TEMPERATURE: 97.9 F | HEIGHT: 74 IN | OXYGEN SATURATION: 98 %

## 2018-05-23 PROBLEM — I50.21 SYSTOLIC CHF, ACUTE (HCC): Status: ACTIVE | Noted: 2018-05-23

## 2018-05-23 LAB
ANION GAP SERPL CALC-SCNC: 7 MMOL/L (ref 5–15)
ATRIAL RATE: 66 BPM
ATRIAL RATE: 69 BPM
BASOPHILS # BLD: 0.1 K/UL (ref 0–0.1)
BASOPHILS NFR BLD: 1 % (ref 0–1)
BUN SERPL-MCNC: 18 MG/DL (ref 6–20)
BUN/CREAT SERPL: 23 (ref 12–20)
CALCIUM SERPL-MCNC: 8.5 MG/DL (ref 8.5–10.1)
CALCULATED P AXIS, ECG09: 53 DEGREES
CALCULATED P AXIS, ECG09: 61 DEGREES
CALCULATED R AXIS, ECG10: -20 DEGREES
CALCULATED R AXIS, ECG10: -29 DEGREES
CALCULATED T AXIS, ECG11: 66 DEGREES
CALCULATED T AXIS, ECG11: 75 DEGREES
CHLORIDE SERPL-SCNC: 106 MMOL/L (ref 97–108)
CK MB CFR SERPL CALC: 6.1 % (ref 0–2.5)
CK MB SERPL-MCNC: 14.5 NG/ML (ref 5–25)
CK SERPL-CCNC: 239 U/L (ref 39–308)
CO2 SERPL-SCNC: 29 MMOL/L (ref 21–32)
CREAT SERPL-MCNC: 0.78 MG/DL (ref 0.7–1.3)
DIAGNOSIS, 93000: NORMAL
DIAGNOSIS, 93000: NORMAL
DIFFERENTIAL METHOD BLD: NORMAL
EOSINOPHIL # BLD: 0.4 K/UL (ref 0–0.4)
EOSINOPHIL NFR BLD: 4 % (ref 0–7)
ERYTHROCYTE [DISTWIDTH] IN BLOOD BY AUTOMATED COUNT: 13.5 % (ref 11.5–14.5)
GLUCOSE BLD STRIP.AUTO-MCNC: 74 MG/DL (ref 65–100)
GLUCOSE BLD STRIP.AUTO-MCNC: 98 MG/DL (ref 65–100)
GLUCOSE SERPL-MCNC: 75 MG/DL (ref 65–100)
HCT VFR BLD AUTO: 43.4 % (ref 36.6–50.3)
HGB BLD-MCNC: 14.3 G/DL (ref 12.1–17)
IMM GRANULOCYTES # BLD: 0 K/UL (ref 0–0.04)
IMM GRANULOCYTES NFR BLD AUTO: 0 % (ref 0–0.5)
LYMPHOCYTES # BLD: 3.5 K/UL (ref 0.8–3.5)
LYMPHOCYTES NFR BLD: 34 % (ref 12–49)
MCH RBC QN AUTO: 29.8 PG (ref 26–34)
MCHC RBC AUTO-ENTMCNC: 32.9 G/DL (ref 30–36.5)
MCV RBC AUTO: 90.4 FL (ref 80–99)
MONOCYTES # BLD: 0.8 K/UL (ref 0–1)
MONOCYTES NFR BLD: 7 % (ref 5–13)
NEUTS SEG # BLD: 5.5 K/UL (ref 1.8–8)
NEUTS SEG NFR BLD: 54 % (ref 32–75)
NRBC # BLD: 0 K/UL (ref 0–0.01)
NRBC BLD-RTO: 0 PER 100 WBC
P-R INTERVAL, ECG05: 188 MS
P-R INTERVAL, ECG05: 190 MS
PLATELET # BLD AUTO: 212 K/UL (ref 150–400)
PMV BLD AUTO: 10 FL (ref 8.9–12.9)
POTASSIUM SERPL-SCNC: 3.9 MMOL/L (ref 3.5–5.1)
Q-T INTERVAL, ECG07: 418 MS
Q-T INTERVAL, ECG07: 434 MS
QRS DURATION, ECG06: 114 MS
QRS DURATION, ECG06: 118 MS
QTC CALCULATION (BEZET), ECG08: 447 MS
QTC CALCULATION (BEZET), ECG08: 454 MS
RBC # BLD AUTO: 4.8 M/UL (ref 4.1–5.7)
SERVICE CMNT-IMP: NORMAL
SERVICE CMNT-IMP: NORMAL
SODIUM SERPL-SCNC: 142 MMOL/L (ref 136–145)
TROPONIN I SERPL-MCNC: 8.1 NG/ML
VENTRICULAR RATE, ECG03: 66 BPM
VENTRICULAR RATE, ECG03: 69 BPM
WBC # BLD AUTO: 10.2 K/UL (ref 4.1–11.1)

## 2018-05-23 PROCEDURE — 74011250637 HC RX REV CODE- 250/637: Performed by: INTERNAL MEDICINE

## 2018-05-23 PROCEDURE — 82962 GLUCOSE BLOOD TEST: CPT

## 2018-05-23 PROCEDURE — 74011250637 HC RX REV CODE- 250/637: Performed by: HOSPITALIST

## 2018-05-23 PROCEDURE — 36415 COLL VENOUS BLD VENIPUNCTURE: CPT | Performed by: INTERNAL MEDICINE

## 2018-05-23 PROCEDURE — 84484 ASSAY OF TROPONIN QUANT: CPT | Performed by: INTERNAL MEDICINE

## 2018-05-23 PROCEDURE — 80048 BASIC METABOLIC PNL TOTAL CA: CPT | Performed by: INTERNAL MEDICINE

## 2018-05-23 PROCEDURE — 93005 ELECTROCARDIOGRAM TRACING: CPT

## 2018-05-23 PROCEDURE — 82550 ASSAY OF CK (CPK): CPT | Performed by: INTERNAL MEDICINE

## 2018-05-23 PROCEDURE — 85025 COMPLETE CBC W/AUTO DIFF WBC: CPT | Performed by: INTERNAL MEDICINE

## 2018-05-23 RX ORDER — FUROSEMIDE 40 MG/1
40 TABLET ORAL DAILY
Qty: 30 TAB | Refills: 11 | Status: ON HOLD | OUTPATIENT
Start: 2018-05-23 | End: 2021-05-04 | Stop reason: SDUPTHER

## 2018-05-23 RX ADMIN — LISINOPRIL 20 MG: 20 TABLET ORAL at 08:22

## 2018-05-23 RX ADMIN — ASPIRIN 81 MG: 81 TABLET, COATED ORAL at 08:22

## 2018-05-23 RX ADMIN — Medication 10 ML: at 06:24

## 2018-05-23 RX ADMIN — GLIPIZIDE 10 MG: 5 TABLET ORAL at 08:22

## 2018-05-23 RX ADMIN — DULOXETINE HYDROCHLORIDE 30 MG: 30 CAPSULE, DELAYED RELEASE ORAL at 08:22

## 2018-05-23 RX ADMIN — PRASUGREL HYDROCHLORIDE 10 MG: 10 TABLET, FILM COATED ORAL at 08:22

## 2018-05-23 RX ADMIN — Medication 10 ML: at 06:25

## 2018-05-23 NOTE — DISCHARGE SUMMARY
Cardiology Discharge Summary     Patient ID:  Stefany Escobedo  [id number]  86 y.o.  1960    Admit Date: 5/18/2018    Discharge Date: 5/23/2018     Admitting Physician: Day Juárez MD     Discharge Physician: Sarah Luciano MD    Admission Diagnoses: NSTEMI (non-ST elevated myocardial infarction) ([facility] [address]   Type II DM  COPD with DOMINIC  Acute systolic CHF  HBP    Discharge Diagnoses: Principal Problem:    NSTEMI (non-ST elevated myocardial infarction) ([facility] [address] (5/18/2018)        Discharge Condition: Good    Cardiology Procedures this Admission:  Left heart catheterization with PCI to SVG and PDA;  Cx OM2    Hospital Course: Admitted with NSTEMI with longstanding CAD, prior MI, CABG in 2009 and subsequent stenting of large OM2 in Oct 2016. Cath show severe lesions in proximal and mid SVG to distal RCA with sequential 95% stenoses in proximal and mid PDA. Good angiographic result with total 4 stents, but had recurrent pain. Repeat cath next day on May 22, shows continued excellent result from May 21. Initially thought to have moderate disease distal to previous stents in Cx OM2,  Which were then stented and all CP resolved. No new EKG changes showing old inferior MI. Enzymes down. Element of CHF with LVEF 40%   F/u to include dietary chnages, reevaluation of DOMINIC and work on smoking cessation. Consults: None    Discharge Exam:     Visit Vitals    /70 (BP 1 Location: Right arm, BP Patient Position: At rest)    Pulse 61    Temp 97.9 °F (36.6 °C)    Resp 18    Ht 6' 2\" (1.88 m)    Wt 110.3 kg (243 lb 2.7 oz)    SpO2 98%    BMI 31.22 kg/m2     General Appearance:  Well developed, well nourished, in no acute distress. Ears/Nose/Mouth/Throat:   Hearing grossly normal.         Neck: Supple. Chest:   Lungs clear to auscultation bilaterally. Normal resp effort. Cardiovascular:  Regular rate and rhythm, S1, S2 normal, no new murmur. Abdomen:   Soft, non-tender, bowel sounds are present.    Extremities: No edema bilaterally. Neuro:  Skin: A/O x 3, grossly nonfocal. Normal mood/affect. Warm and dry. Disposition: home    Patient Instructions:   Current Discharge Medication List      START taking these medications    Details   furosemide (LASIX) 40 mg tablet Take 1 Tab by mouth daily. Qty: 30 Tab, Refills: 11         CONTINUE these medications which have NOT CHANGED    Details   insulin glargine (LANTUS,BASAGLAR) 100 unit/mL (3 mL) inpn 85 Units by SubCUTAneous route nightly. evolocumab (REPATHA PUSHTRONEX SC) 10 mg by SubCUTAneous route. Every 2 weeks on Sunday; next dose due 5/20/18      dapagliflozin (FARXIGA) 10 mg tab tablet Take 10 mg by mouth daily. aspirin delayed-release 81 mg tablet Take 1 Tab by mouth daily. Qty: 30 Tab, Refills: 11      prasugrel (EFFIENT) 10 mg tablet Take 1 Tab by mouth nightly. Qty: 30 Tab, Refills: 11      nebivolol (BYSTOLIC) 5 mg tablet Take 5 mg by mouth every evening. DULoxetine (CYMBALTA) 30 mg capsule Take 30 mg by mouth daily. lisinopril (PRINIVIL, ZESTRIL) 20 mg tablet Take 20 mg by mouth daily. glipiZIDE (GLUCOTROL) 10 mg tablet Take 10 mg by mouth two (2) times a day. Referenced discharge instructions provided by nursing for diet and activity. Follow-up with Dr. Abelino Gonzales in one week.      Signed:  Reji Davila MD  5/23/2018  8:53 AM

## 2018-05-23 NOTE — CARDIO/PULMONARY
Cardiac Wellness; Visited with Pedro Gutierrez and his wife to reinforce education just prior to discharge. He had additional stenting yesterday and feels so much better. Stressed the importance of smoking cessation and confirmed that the web link is on his AVS for use after discharge. Discussed the Cardiac Rehab program at [facility] but with [facility] he likely will not be able to enroll. The CWP at [facility] does not have evening hours so likely he will exercise independently. Cardiac Rehab: Heart Failure education folder, with Low Sodium brochure, given to Pedro Gutierrez. Educated using teach back method. Discussed diagnosis definition and assessed patient understanding. Reviewed importance of daily weight monitoring and Low Sodium diet (7205-7799 mg. Daily). Introduced the use of the [employer] cassandra for his smart phone to track his sodium daily. Encouraged activity and rest periods within symptom limitations and as ordered by physician. Reviewed bystolic purpose of medication, potential side effects, compliance, and what to do if dose is missed. Discussed importance of reporting signs and symptoms of exacerbation, and when to report them to the doctor, to prevent re-hospitalization. Pedro Gutierrez was encouraged to keep all appointments with doctor.  Larry Zamorano RN

## 2018-05-23 NOTE — PROGRESS NOTES
0730: Bedside and Verbal shift change report given to BREANN RN (oncoming nurse) by Leighann Denise RN and Cat Hall RN (offgoing nurse). Report included the following information SBAR, Kardex, Intake/Output, MAR, Recent Results, Med Rec Status and Cardiac Rhythm NSR.   0910: Pt has declined H2H at this time. 6 minute walk test complete with wife and RN's assistance. 0945: Pt given discharge instructions and follow up appointment made with PCP, Anna Allen MD and pulmonologist.  I have reviewed discharge instructions with the patient. The patient  DISCHARGE SUMMARY from Nurse    PATIENT INSTRUCTIONS:    After general anesthesia or intravenous sedation, for 24 hours or while taking prescription Narcotics:  · Limit your activities  · Do not drive and operate hazardous machinery  · Do not make important personal or business decisions  · Do  not drink alcoholic beverages  · If you have not urinated within 8 hours after discharge, please contact your surgeon on call. Report the following to your surgeon:  · Excessive pain, swelling, redness or odor of or around the surgical area  · Temperature over 100.5  · Nausea and vomiting lasting longer than 4 hours or if unable to take medications  · Any signs of decreased circulation or nerve impairment to extremity: change in color, persistent  numbness, tingling, coldness or increase pain  · Any questions    What to do at Home:  Recommended activity: Activity as tolerated, see discharge instructions    If you experience any of the following symptoms see discharge instructions, please follow up with Anna Allen MD.    *  Please give a list of your current medications to your Primary Care Provider. *  Please update this list whenever your medications are discontinued, doses are      changed, or new medications (including over-the-counter products) are added. *  Please carry medication information at all times in case of emergency situations.     These are general instructions for a healthy lifestyle:    No smoking/ No tobacco products/ Avoid exposure to second hand smoke  Surgeon General's Warning:  Quitting smoking now greatly reduces serious risk to your health. Obesity, smoking, and sedentary lifestyle greatly increases your risk for illness    A healthy diet, regular physical exercise & weight monitoring are important for maintaining a healthy lifestyle    You may be retaining fluid if you have a history of heart failure or if you experience any of the following symptoms:  Weight gain of 3 pounds or more overnight or 5 pounds in a week, increased swelling in our hands or feet or shortness of breath while lying flat in bed. Please call your doctor as soon as you notice any of these symptoms; do not wait until your next office visit. Recognize signs and symptoms of STROKE:    F-face looks uneven    A-arms unable to move or move unevenly    S-speech slurred or non-existent    T-time-call 911 as soon as signs and symptoms begin-DO NOT go       Back to bed or wait to see if you get better-TIME IS BRAIN. Warning Signs of HEART ATTACK     Call 911 if you have these symptoms:   Chest discomfort. Most heart attacks involve discomfort in the center of the chest that lasts more than a few minutes, or that goes away and comes back. It can feel like uncomfortable pressure, squeezing, fullness, or pain.  Discomfort in other areas of the upper body. Symptoms can include pain or discomfort in one or both arms, the back, neck, jaw, or stomach.  Shortness of breath with or without chest discomfort.  Other signs may include breaking out in a cold sweat, nausea, or lightheadedness. Don't wait more than five minutes to call [address]! Fast action can save your life. Calling 911 is almost always the fastest way to get lifesaving treatment. Emergency Medical Services staff can begin treatment when they arrive  up to an hour sooner than if someone gets to the hospital by car.      The discharge information has been reviewed with the patient. The patient verbalized understanding. Discharge medications reviewed with the patient and appropriate educational materials and side effects teaching were provided. ___________________________________________________________________________________________________________________________________ verbalized understanding. Problem: Falls - Risk of  Goal: *Absence of Falls  Document Kecia Fall Risk and appropriate interventions in the flowsheet. Outcome: Progressing Towards Goal  Pt up ad keith with no s/sx of unsteadiness noted. Pt's bed in low/locked position. All personal items and call bell within reach. Side rails up x 2. Pt ambulated around nurses station with no assistance needed. Fall Risk Interventions:            Medication Interventions: Assess postural VS orthostatic hypotension, Bed/chair exit alarm, Evaluate medications/consider consulting pharmacy                  Problem: Pressure Injury - Risk of  Goal: *Prevention of pressure injury  Document Carlos Scale and appropriate interventions in the flowsheet. Outcome: Progressing Towards Goal  Pt has no s/sx of pressure ulcer noted. Pressure Injury Interventions: Activity Interventions: Chair cushion, Increase time out of bed, Pressure redistribution bed/mattress(bed type)    Mobility Interventions: PT/OT evaluation, Pressure redistribution bed/mattress (bed type)    Nutrition Interventions: Offer support with meals,snacks and hydration, Discuss nutritional consult with provider                    Problem: Unstable Angina/NSTEMI: Discharge Outcomes  Goal: *Hemodynamically stable  Outcome: Progressing Towards Goal  Pt's VSS. Goal: *Stable cardiac rhythm  Outcome: Progressing Towards Goal  Pt in NSR with no ectopy noted. Goal: *Lungs clear or at baseline  Outcome: Progressing Towards Goal  Pt's lungs are CTA bilaterally.

## 2018-05-23 NOTE — DISCHARGE INSTRUCTIONS
[address] [facility],  [address] [facility]    [phone]  [place]     [email or web address]    Patient Discharge Instructions    Abimbola Richard / [de-identified] : 1960    Admitted 2018 Discharged: 2018     Take Home Medications              · It is important that you take the medication exactly as they are prescribed. · Keep your medication in the bottles provided by the pharmacist and keep a list of the medication names, dosages, and times to be taken in your wallet. · Do not take other medications without consulting your doctor. BRING ALL OF YOUR MEDICINES TO YOUR OFFICE VISIT with Dr. Nathan Denney. What to do at Home    Recommended activity: Activity as tolerated. Return to work May 29. Follow-up with Nkechi Hernandez MD in 1 week    Follow-up with your primary care physician in 1 month. See [facility] re:  Update evaluation and management of sleep apnea. Start vegan diet. Lifestyle changes  Do not smoke. Smoking increases your risk of another heart attack. If you need help quitting, talk to your doctor about stop-smoking programs and medicines. These can increase your chances of quitting for good. Eat a heart-healthy diet that is low in cholesterol, saturated fat, and salt, and is full of fruits, vegetables and whole-grains. Eat at least two servings of fish each week. You may get more details about how to eat healthy, but these tips can help you get started. Avoid colds and flu. Get a pneumococcal vaccine shot. If you have had one before, ask your doctor whether you need a second dose. Get a flu shot every fall. If you must be around people with colds or flu, wash your hands often. When should you call for help? Call 911 anytime you think you may need emergency care. For example, call if:  You have signs of a heart attack. These may include:  Chest pain or pressure. Sweating. Shortness of breath. Nausea or vomiting.   Pain that spreads from the chest to the neck, jaw, or one or both shoulders or arms. Dizziness or lightheadedness. A fast or irregular pulse. After calling 911, chew 1 adult-strength aspirin. Wait for an ambulance. Do not try to drive yourself. You passed out (lost consciousness). You feel like you are having another heart attack. Call your doctor now or seek immediate medical care if:  You have had any chest pain, even if it has gone away. You have new or increased shortness of breath. You are dizzy or lightheaded, or you feel like you may faint. Watch closely for changes in your health, and be sure to contact your doctor if you have any problem      Information obtained by :  I understand that if any problems occur once I am at home I am to contact my physician. I understand and acknowledge receipt of the instructions indicated above. R.N.'s Signature                                                                  Date/Time                                                                                                                                              Patient or Representative Signature                                                          Date/Time      Fifi Slater MD         . [place] [address]   [phone]  [address]    [email or web address]  Smoking Cessation Program: This is a free, phone/text/email based, smoking cessation program. The program is individualized to meet each patient's needs. To enroll use the link [email or web address] or text Saran Thompson to [phone] from any smart phone.

## 2018-05-23 NOTE — PROGRESS NOTES
05/23/18 0900   Six Minute Walk Report (PRE)   Heart Rate 76   O2 Saturation 99   Six Minute Walk Report (POST)   Heart Rate 82   O2 Saturation 98   Distance Walked in Feet (ft) 500 ft.    Distance in Meters 152.4 meters

## 2018-05-23 NOTE — NURSE NAVIGATOR
Chart reviewed by Heart Failure Nurse Navigator. Heart Failure database completed. EF:  40%     ACEi/ARB: Lisinopril 20mg daily    BB: Bystolic 5mg daily    Aldosterone Antagonist: not indicated at this time    CRT not indicated at this time. NYHA Functional Class not assigned. Heart Failure Teach Back in Patient Education. Heart Failure Avoiding Triggers on Discharge Instructions.       Cardiologist: Dr. Debra Mcknight

## 2018-05-23 NOTE — PROGRESS NOTES
1930: Bedside shift change report given to Jo Ann Velez and Regino Walker RN (oncoming nurse) by Lori (offgoing nurse). Report included the following information SBAR, Kardex, Intake/Output, MAR and Cardiac Rhythm NSR.     0630: blood sugar 74 this am. Patient asymptomatic. Drinking soda and will recheck in 15 minutes. 8991: patients blood sugar 98 after recheck. Problem: Falls - Risk of  Goal: *Absence of Falls  Document Kecia Fall Risk and appropriate interventions in the flowsheet. Outcome: Progressing Towards Goal  Fall Risk Interventions:          Medication Interventions: Evaluate medications/consider consulting pharmacy, Patient to call before getting OOB          Problem: Diabetes Self-Management  Goal: *Disease process and treatment process  Define diabetes and identify own type of diabetes; list 3 options for treating diabetes. Outcome: Progressing Towards Goal  Monitoring blood sugars Q4 hours, WNL    Problem: Cath Lab Procedures: Post-Cath Day 1  Goal: Diagnostic Test/Procedures  Outcome: Progressing Towards Goal  Post op day 1. Left and right groin sites. No bleeding. Vitals stable. 0730: Bedside shift change report given to BREANN RN (oncoming nurse) by Ramy Medrano RN (offgoing nurse). Report included the following information SBAR, Kardex, MAR and Cardiac Rhythm NSR.

## 2018-05-23 NOTE — PROGRESS NOTES
Problem: Heart Failure: Day 1  Goal: Off Pathway (Use only if patient is Off Pathway)  Outcome: Progressing Towards Goal  Vitals stable. Daily weights and I&O's. NSR on monitor. Problem: Heart Failure: Day 2  Goal: Off Pathway (Use only if patient is Off Pathway)  Outcome: Progressing Towards Goal  Vitals stable. Daily weights and I&O's. NSR.  Denies chest pain or SOB

## 2018-05-23 NOTE — PROGRESS NOTES
5/23/2018    Pt discharged by cardiology prior to hospitalist seeing patient  D/C summary completed by cardiology    Sue Monroy NP

## 2018-05-24 ENCOUNTER — PATIENT OUTREACH (OUTPATIENT)
Dept: OTHER | Age: 58
End: 2018-05-24

## 2018-05-24 ENCOUNTER — TELEPHONE (OUTPATIENT)
Dept: CARDIAC REHAB | Age: 58
End: 2018-05-24

## 2018-05-24 NOTE — TELEPHONE ENCOUNTER
5/24/2018 Cardiac Rehab: Called Mr. Channing De La Torre  to discuss participation in the Cardiac Rehab Program following a NSTEMI on 5/18/18 and stents on 5/21/18 and 5/22/18. Left voicemail message. Will follow the week of 5/25/18.  Hero Vital RN

## 2018-05-25 ENCOUNTER — PATIENT OUTREACH (OUTPATIENT)
Dept: OTHER | Age: 58
End: 2018-05-25

## 2018-05-25 ENCOUNTER — TELEPHONE (OUTPATIENT)
Dept: CARDIAC REHAB | Age: 58
End: 2018-05-25

## 2018-05-25 NOTE — PROGRESS NOTES
Second attempt to reach patient for [facility] Program, and discharge assessment. Discreet VM left. Will send UTR letter.

## 2018-05-25 NOTE — TELEPHONE ENCOUNTER
5/25/2018 Cardiac Rehab: Ayaka Sun   and message left. Patient indicated that he requires evening hours for cardiac rehab therefore this is the final call. Will await Yasmany King's call back. Xander Novak RN    5/24/2018 Cardiac Rehab: Called Mr. Pedro Gutierrez  to discuss participation in the Cardiac Rehab Program following a NSTEMI on 5/18/18 and stents on 5/21/18 and 5/22/18. Left voicemail message. Will follow the week of 5/25/18.  Xander Novak RN

## 2018-05-25 NOTE — LETTER
5/25/2018 11:32 AM 
 
Mr. Kavitha Singh [facility] [address]  [id number] Dear Mr. Jodi Clement, My name is Quita Trujillo, Employee Care Manager for [employer], and I have been trying to reach you. The Employee Care  is a free-of-charge, confidential service provided to our employees and their family members covered by the [facility]. Part of my job is to follow up with members who have recently been in the hospital or emergency room, to help them coordinate their care and answer questions they may have about their visit. I am able to provide assistance with medication questions, scheduling needed follow-up appointments, and arranging services like home health or home medical equipment. I can also provide education regarding your hospital or ER visit as well as your medical conditions. As healthcare providers, we know that patients do better when they have close follow up with a primary care provider (PCP), especially after a hospital or emergency department visit. If you do not have a PCP, I can help you find one that is convenient to you and covered by your insurance. I can also help you understand any after visit instructions, such as what symptoms to watch out for, or any new or changed medications. Remember that you can access your After Visit Summary by logging into your [employer] account. If you do not have a [employer] account, I can help you request access. Our program is designed to provide you with the opportunity to have a [employer] care manager partner with you for your healthcare needs. Please contact me at the below number if I can provide you with assistance for any of the above services. Sincerely, Quita Trujillo RN, BSN  Employee Care Manager [address] [place] [address], [address] [place] [address] Road [address] [place] [address] Cell [phone] Fax [email or web address] 
 [place] [email or web address]

## 2018-06-05 ENCOUNTER — HOSPITAL ENCOUNTER (OUTPATIENT)
Dept: CARDIAC CATH/INVASIVE PROCEDURES | Age: 58
Setting detail: OBSERVATION
Discharge: HOME | End: 2018-06-07
Attending: INTERNAL MEDICINE | Admitting: INTERNAL MEDICINE
Payer: COMMERCIAL

## 2018-06-05 PROBLEM — I20.0 UNSTABLE ANGINA (HCC): Status: ACTIVE | Noted: 2018-06-05

## 2018-06-05 LAB
GLUCOSE BLD STRIP.AUTO-MCNC: 100 MG/DL (ref 65–100)
GLUCOSE BLD STRIP.AUTO-MCNC: 114 MG/DL (ref 65–100)
SERVICE CMNT-IMP: ABNORMAL
SERVICE CMNT-IMP: NORMAL

## 2018-06-05 PROCEDURE — 77030029065 HC DRSG HEMO QCLOT ZMED -B

## 2018-06-05 PROCEDURE — 77030013744

## 2018-06-05 PROCEDURE — C1725 CATH, TRANSLUMIN NON-LASER: HCPCS

## 2018-06-05 PROCEDURE — C1753 CATH, INTRAVAS ULTRASOUND: HCPCS

## 2018-06-05 PROCEDURE — 74011250637 HC RX REV CODE- 250/637: Performed by: INTERNAL MEDICINE

## 2018-06-05 PROCEDURE — 99153 MOD SED SAME PHYS/QHP EA: CPT

## 2018-06-05 PROCEDURE — C1887 CATHETER, GUIDING: HCPCS

## 2018-06-05 PROCEDURE — 77030004532 HC CATH ANGI DX IMP BSC -A

## 2018-06-05 PROCEDURE — C1769 GUIDE WIRE: HCPCS

## 2018-06-05 PROCEDURE — 74011000250 HC RX REV CODE- 250: Performed by: INTERNAL MEDICINE

## 2018-06-05 PROCEDURE — 93005 ELECTROCARDIOGRAM TRACING: CPT

## 2018-06-05 PROCEDURE — 74011000258 HC RX REV CODE- 258: Performed by: INTERNAL MEDICINE

## 2018-06-05 PROCEDURE — 93459 L HRT ART/GRFT ANGIO: CPT

## 2018-06-05 PROCEDURE — 99218 HC RM OBSERVATION: CPT

## 2018-06-05 PROCEDURE — 77030004533 HC CATH ANGI DX IMP BSC -B

## 2018-06-05 PROCEDURE — 99152 MOD SED SAME PHYS/QHP 5/>YRS: CPT

## 2018-06-05 PROCEDURE — 74011636320 HC RX REV CODE- 636/320: Performed by: INTERNAL MEDICINE

## 2018-06-05 PROCEDURE — 92978 ENDOLUMINL IVUS OCT C 1ST: CPT

## 2018-06-05 PROCEDURE — 92937 PRQ TRLUML REVSC CAB GRF 1: CPT

## 2018-06-05 PROCEDURE — 77030013715 HC INFL SYS MRTM -B

## 2018-06-05 PROCEDURE — 74011250636 HC RX REV CODE- 250/636: Performed by: INTERNAL MEDICINE

## 2018-06-05 PROCEDURE — 82962 GLUCOSE BLOOD TEST: CPT

## 2018-06-05 PROCEDURE — 74011636637 HC RX REV CODE- 636/637: Performed by: INTERNAL MEDICINE

## 2018-06-05 PROCEDURE — 92928 PRQ TCAT PLMT NTRAC ST 1 LES: CPT

## 2018-06-05 PROCEDURE — C1874 STENT, COATED/COV W/DEL SYS: HCPCS

## 2018-06-05 RX ORDER — PRASUGREL 10 MG/1
10 TABLET, FILM COATED ORAL DAILY
Status: DISCONTINUED | OUTPATIENT
Start: 2018-06-06 | End: 2018-06-07 | Stop reason: HOSPADM

## 2018-06-05 RX ORDER — ACETAMINOPHEN 325 MG/1
650 TABLET ORAL
Status: DISCONTINUED | OUTPATIENT
Start: 2018-06-05 | End: 2018-06-07 | Stop reason: HOSPADM

## 2018-06-05 RX ORDER — HYDROCODONE BITARTRATE AND ACETAMINOPHEN 5; 325 MG/1; MG/1
1 TABLET ORAL
Status: DISCONTINUED | OUTPATIENT
Start: 2018-06-05 | End: 2018-06-07 | Stop reason: HOSPADM

## 2018-06-05 RX ORDER — DULOXETIN HYDROCHLORIDE 30 MG/1
30 CAPSULE, DELAYED RELEASE ORAL DAILY
Status: DISCONTINUED | OUTPATIENT
Start: 2018-06-06 | End: 2018-06-07 | Stop reason: HOSPADM

## 2018-06-05 RX ORDER — ASPIRIN 81 MG/1
81 TABLET ORAL DAILY
Status: DISCONTINUED | OUTPATIENT
Start: 2018-06-06 | End: 2018-06-07 | Stop reason: HOSPADM

## 2018-06-05 RX ORDER — SODIUM CHLORIDE 9 MG/ML
3 INJECTION, SOLUTION INTRAVENOUS CONTINUOUS
Status: DISPENSED | OUTPATIENT
Start: 2018-06-05 | End: 2018-06-05

## 2018-06-05 RX ORDER — PRASUGREL 10 MG/1
10 TABLET, FILM COATED ORAL
Status: DISCONTINUED | OUTPATIENT
Start: 2018-06-05 | End: 2018-06-05 | Stop reason: SDUPTHER

## 2018-06-05 RX ORDER — FUROSEMIDE 40 MG/1
40 TABLET ORAL DAILY
Status: DISCONTINUED | OUTPATIENT
Start: 2018-06-06 | End: 2018-06-07 | Stop reason: HOSPADM

## 2018-06-05 RX ORDER — SODIUM CHLORIDE 0.9 % (FLUSH) 0.9 %
5-10 SYRINGE (ML) INJECTION AS NEEDED
Status: DISCONTINUED | OUTPATIENT
Start: 2018-06-05 | End: 2018-06-05

## 2018-06-05 RX ORDER — GLIPIZIDE 5 MG/1
10 TABLET ORAL 2 TIMES DAILY
Status: DISCONTINUED | OUTPATIENT
Start: 2018-06-05 | End: 2018-06-07 | Stop reason: HOSPADM

## 2018-06-05 RX ORDER — PRASUGREL 10 MG/1
10-60 TABLET, FILM COATED ORAL AS NEEDED
Status: DISCONTINUED | OUTPATIENT
Start: 2018-06-05 | End: 2018-06-05

## 2018-06-05 RX ORDER — FENTANYL CITRATE 50 UG/ML
25-200 INJECTION, SOLUTION INTRAMUSCULAR; INTRAVENOUS AS NEEDED
Status: DISCONTINUED | OUTPATIENT
Start: 2018-06-05 | End: 2018-06-05

## 2018-06-05 RX ORDER — SODIUM CHLORIDE 0.9 % (FLUSH) 0.9 %
5-10 SYRINGE (ML) INJECTION EVERY 8 HOURS
Status: DISCONTINUED | OUTPATIENT
Start: 2018-06-05 | End: 2018-06-05

## 2018-06-05 RX ORDER — VERAPAMIL HYDROCHLORIDE 2.5 MG/ML
2.5-5 INJECTION, SOLUTION INTRAVENOUS AS NEEDED
Status: DISCONTINUED | OUTPATIENT
Start: 2018-06-05 | End: 2018-06-05

## 2018-06-05 RX ORDER — HEPARIN SODIUM 1000 [USP'U]/ML
1000-10000 INJECTION, SOLUTION INTRAVENOUS; SUBCUTANEOUS AS NEEDED
Status: DISCONTINUED | OUTPATIENT
Start: 2018-06-05 | End: 2018-06-05

## 2018-06-05 RX ORDER — SODIUM CHLORIDE 9 MG/ML
1.5 INJECTION, SOLUTION INTRAVENOUS CONTINUOUS
Status: DISCONTINUED | OUTPATIENT
Start: 2018-06-05 | End: 2018-06-06

## 2018-06-05 RX ORDER — ATROPINE SULFATE 0.1 MG/ML
.5-1 INJECTION INTRAVENOUS AS NEEDED
Status: DISCONTINUED | OUTPATIENT
Start: 2018-06-05 | End: 2018-06-05

## 2018-06-05 RX ORDER — MIDAZOLAM HYDROCHLORIDE 1 MG/ML
.5-1 INJECTION, SOLUTION INTRAMUSCULAR; INTRAVENOUS AS NEEDED
Status: DISCONTINUED | OUTPATIENT
Start: 2018-06-05 | End: 2018-06-05

## 2018-06-05 RX ORDER — SODIUM CHLORIDE 0.9 % (FLUSH) 0.9 %
5-10 SYRINGE (ML) INJECTION EVERY 8 HOURS
Status: DISCONTINUED | OUTPATIENT
Start: 2018-06-05 | End: 2018-06-07 | Stop reason: HOSPADM

## 2018-06-05 RX ORDER — LIDOCAINE HYDROCHLORIDE 10 MG/ML
10-30 INJECTION INFILTRATION; PERINEURAL
Status: DISCONTINUED | OUTPATIENT
Start: 2018-06-05 | End: 2018-06-05

## 2018-06-05 RX ORDER — CLOPIDOGREL 300 MG/1
300-600 TABLET, FILM COATED ORAL AS NEEDED
Status: DISCONTINUED | OUTPATIENT
Start: 2018-06-05 | End: 2018-06-05

## 2018-06-05 RX ORDER — HEPARIN SODIUM 200 [USP'U]/100ML
2000 INJECTION, SOLUTION INTRAVENOUS AS NEEDED
Status: DISCONTINUED | OUTPATIENT
Start: 2018-06-05 | End: 2018-06-05

## 2018-06-05 RX ORDER — NEBIVOLOL 5 MG/1
5 TABLET ORAL EVERY EVENING
Status: DISCONTINUED | OUTPATIENT
Start: 2018-06-05 | End: 2018-06-07 | Stop reason: HOSPADM

## 2018-06-05 RX ORDER — INSULIN GLARGINE 100 [IU]/ML
85 INJECTION, SOLUTION SUBCUTANEOUS
Status: DISCONTINUED | OUTPATIENT
Start: 2018-06-05 | End: 2018-06-07 | Stop reason: HOSPADM

## 2018-06-05 RX ORDER — SODIUM CHLORIDE 0.9 % (FLUSH) 0.9 %
5-10 SYRINGE (ML) INJECTION AS NEEDED
Status: DISCONTINUED | OUTPATIENT
Start: 2018-06-05 | End: 2018-06-07 | Stop reason: HOSPADM

## 2018-06-05 RX ADMIN — ACETAMINOPHEN 650 MG: 325 TABLET ORAL at 18:01

## 2018-06-05 RX ADMIN — NITROGLYCERIN 200 MCG: 5 INJECTION, SOLUTION INTRAVENOUS at 11:33

## 2018-06-05 RX ADMIN — FENTANYL CITRATE 25 MCG: 50 INJECTION, SOLUTION INTRAMUSCULAR; INTRAVENOUS at 10:56

## 2018-06-05 RX ADMIN — MIDAZOLAM HYDROCHLORIDE 2 MG: 1 INJECTION, SOLUTION INTRAMUSCULAR; INTRAVENOUS at 10:56

## 2018-06-05 RX ADMIN — BIVALIRUDIN 184.97 MG/HR: 250 INJECTION, POWDER, LYOPHILIZED, FOR SOLUTION INTRAVENOUS at 11:54

## 2018-06-05 RX ADMIN — NEBIVOLOL HYDROCHLORIDE 5 MG: 5 TABLET ORAL at 17:58

## 2018-06-05 RX ADMIN — SODIUM CHLORIDE 1.5 ML/KG/HR: 900 INJECTION, SOLUTION INTRAVENOUS at 11:31

## 2018-06-05 RX ADMIN — IOPAMIDOL 335 ML: 755 INJECTION, SOLUTION INTRAVENOUS at 13:04

## 2018-06-05 RX ADMIN — HYDROCODONE BITARTRATE AND ACETAMINOPHEN 1 TABLET: 5; 325 TABLET ORAL at 20:23

## 2018-06-05 RX ADMIN — NITROGLYCERIN 200 MCG: 5 INJECTION, SOLUTION INTRAVENOUS at 12:58

## 2018-06-05 RX ADMIN — PRASUGREL 30 MG: 10 TABLET, FILM COATED ORAL at 13:02

## 2018-06-05 RX ADMIN — INSULIN GLARGINE 85 UNITS: 100 INJECTION, SOLUTION SUBCUTANEOUS at 21:24

## 2018-06-05 RX ADMIN — SODIUM CHLORIDE 3 ML/KG/HR: 900 INJECTION, SOLUTION INTRAVENOUS at 10:23

## 2018-06-05 RX ADMIN — Medication 2000 UNITS: at 11:20

## 2018-06-05 RX ADMIN — NITROGLYCERIN 200 MCG: 5 INJECTION, SOLUTION INTRAVENOUS at 12:47

## 2018-06-05 RX ADMIN — FENTANYL CITRATE 25 MCG: 50 INJECTION, SOLUTION INTRAMUSCULAR; INTRAVENOUS at 11:08

## 2018-06-05 RX ADMIN — LIDOCAINE HYDROCHLORIDE 10 ML: 10 INJECTION, SOLUTION INFILTRATION; PERINEURAL at 11:08

## 2018-06-05 RX ADMIN — GLIPIZIDE 10 MG: 5 TABLET ORAL at 17:58

## 2018-06-05 RX ADMIN — Medication 10 ML: at 21:17

## 2018-06-05 RX ADMIN — MIDAZOLAM HYDROCHLORIDE 2 MG: 1 INJECTION, SOLUTION INTRAMUSCULAR; INTRAVENOUS at 11:07

## 2018-06-05 RX ADMIN — BIVALIRUDIN 184.97 MG/HR: 250 INJECTION, POWDER, LYOPHILIZED, FOR SOLUTION INTRAVENOUS at 11:31

## 2018-06-05 NOTE — PROGRESS NOTES
Cardiac Cath Lab Procedure Area Arrival Note:    Gwen Rajan arrived to Cardiac Cath Lab, Procedure Area. Patient identifiers verified with NAME and DATE OF BIRTH. Procedure verified with patient. Consent forms verified. Allergies verified. Patient informed of procedure and plan of care. Questions answered with review. Patient voiced understanding of procedure and plan of care. Patient on cardiac monitor, non-invasive blood pressure, SPO2 monitor. On room air and placed on O2 @ 2 lpm via nc. IV of normal saline on pump at 317 ml/hr. Patient status doing well without problems. Patient is A&Ox 4. Patient reports 4/10 chest pain. Patient medicated during procedure with orders obtained and verified by Dr. Belen Julian. Refer to patients Cardiac Cath Lab PROCEDURE REPORT for vital signs, assessment, status, and response during procedure, printed at end of case. Printed report on chart or scanned into chart.

## 2018-06-05 NOTE — PROGRESS NOTES
SHEATH PULL NOTE:    Patient informed of procedure with questions answered with review. Sheath site prepped with Chloraprep swab. 6 fr sheath in RFA pulled by NATHANAEL Day RN. Hand hold and Quik Clot, with manual compression to site. No bleeding, no hematoma, no pain at site. Hemostasis obtained with hand hold/manual compression at site. Patient tolerated well. No change in status. Handhold for 15 minutes. No change at site. Tegaderm dressing applied to site. No bleeding, no hematoma, no pain/discomfort at site. Groin instructions provided with review. Continue to monitor procedure site and patient status. *Advised patient to keep head flat and extremity flat to decrease risk of bleeding. *Recommended that patient not drink for ONE HOUR post sheath pull completion. *Recommended that patient not eat for TWO HOURS post sheath pull completion. *Instructed patient on rationale for delay of PO products to decrease risk for aspiration and if additional treatment to procedure site is required. Patient verbalized understanding of instructions with review.

## 2018-06-05 NOTE — ROUTINE PROCESS
Cardiac Cath Lab Recovery Arrival Note:      [place] arrived to Cardiac Cath Lab, Recovery Area. Staff introduced to patient. Patient identifiers verified with NAME and DATE OF BIRTH. Procedure verified with patient. Consent forms reviewed and signed by patient or authorized representative and verified. Allergies verified. Patient and family oriented to department. Patient and family informed of procedure and plan of care. Questions answered with review. Patient prepped for procedure, per orders from physician, prior to arrival.    Patient on cardiac monitor, non-invasive blood pressure, SPO2 monitor. On RA. Patient is A&Ox 4. Patient reports no pain. Patient in stretcher, in low position, with side rails up, call bell within reach, patient instructed to call if assistance as needed. Patient prep in: [address], [place] [address]. Patient family has pager #   Family in: . Prep by: NATHANAEL Bui RN

## 2018-06-05 NOTE — PROGRESS NOTES
TRANSFER - OUT REPORT:    Verbal report given to [facility] on Rodo being transferred to [place] [address] room 453 for routine progression of care       Report consisted of patients Situation, Background, Assessment and   Recommendations(SBAR). Information from the following report(s) Kardex and Procedure Summary was reviewed with the receiving nurse. Opportunity for questions and clarification was provided.

## 2018-06-05 NOTE — PROCEDURES
Cardiac Catheterization Procedure Note   Patient: Gwen Rajan  MRN: [de-identified]  SSN: [id number]   YOB: 1960 Age: 60 y.o. Sex: male    Date of Procedure: 6/5/2018   Pre-procedure Diagnosis: Congestive Heart Failure, Coronary Artery Disease and Unstable Angina  Post-procedure Diagnosis: Congestive Heart Failure, Coronary Artery Disease and Unstable Angina  Procedure: Left Heart Cath with Bypass Grafts, IVUS Cx, PCI, to RCA and to circumflex  :  Dr. My Staley MD    Assistant(s):  None  Anesthesia: Moderate Sedation   Estimated Blood Loss: Less than 10 mL   Specimens Removed: None  Findings: Under expanded stents in mid Cx with probable stent strut protrusion proximally. Treated with 3.0 NC distally and 3.5 NC more proximally with 4.0 NC to proximal stent and proximal vessel, completing with 4.0 x 15 Anthony covering proximal Cx into previous proximal stent. Distal RCA 80% hazy stenosis treated with 3.0 X 12 Anthony through SVG. No change in LMA to LAD, chronically occluded SVG to ramus and chronic occlusion of native RCA and LAD. LVEF 40%. Well tolerated.   Complications: None   Implants:  None  Signed by:  My Staley MD  6/5/2018  1:09 PM

## 2018-06-05 NOTE — PROGRESS NOTES
1700: PRECEPTOR REVIEW OF RN ORIENTEE'S WORK:    6/5/2018    Shift times- 1700 to 2000    The RN orientee's documentation of patient care for Sherryl Cogan has been reviewed and approved. All medications have been administered under the direct supervision of the preceptor. Taty Mcduffie Argue

## 2018-06-05 NOTE — PROGRESS NOTES
TRANSFER - OUT REPORT:    Verbal report given to Jane(name) on Sonia Necessary  being transferred to Cath lab recovery(unit) for routine progression of care       Report consisted of patients Situation, Background, Assessment and   Recommendations(SBAR). Information from the following report(s) Procedure Summary and MAR was reviewed with the receiving nurse. Lines:   Peripheral IV 06/05/18 Right Antecubital (Active)   Site Assessment Clean, dry, & intact 6/5/2018 10:24 AM   Phlebitis Assessment 0 6/5/2018 10:24 AM   Infiltration Assessment 0 6/5/2018 10:24 AM   Dressing Status Clean, dry, & intact 6/5/2018 10:24 AM   Dressing Type Transparent 6/5/2018 10:24 AM   Hub Color/Line Status Pink 6/5/2018 10:24 AM        Opportunity for questions and clarification was provided.       Patient transported with:   Registered Nurse

## 2018-06-05 NOTE — IP AVS SNAPSHOT
[address] [facility] Zee Wallace 13 
[phone] Patient: Krupa Parmar MRN: [id number]:[id number] About your hospitalization You were admitted on:  June 5, 2018 You last received care in the:  [facility] 4 CV SERVICES UNIT You were discharged on:  June 7, 2018 Why you were hospitalized Your primary diagnosis was:  Not on File Your diagnoses also included:  Unstable Angina (Hcc) Follow-up Information Follow up With Details Comments Contact Info Additional Information [facility] CARDIAC WELLNESS Call Call when ready to enroll in cardiac rehab. [address] [facility] [address] 330 [facility], [address]. Please arrive 15 minutes prior to your appointment time and you will register in the [place] [address], [address], on the first floor of the [address] Telephone: [phone] Fax: [phone] Driving directions To [facility] [place]. Building: Driving WEST on [address], take [address] to [email or web address]. Turn left onto [facility] [address], then turn right into [place] [address] Driving EAST on [address], take exit [address] [facility]. Turn right at the end of the exit ramp. Turn left onto [address] [facility] [address], then turn right into [place] lot. Shey Kern, CRISTELA   [address] [place] [address] [phone] Shaji Kuo MD On 6/14/2018 followup with Dr. Bradley Robles 6/14/18 @ [address] [place] [address] [address] [facility] 
[phone] Discharge Orders None A check rikki indicates which time of day the medication should be taken. My Medications START taking these medications Instructions Each Dose to Equal  
 Morning Noon Evening Bedtime  
 sacubitril-valsartan 24-26 mg tablet Commonly known as:  ENTRESTO Your last dose was: Your next dose is: Take 1 Tab by mouth every twelve (12) hours. 1 Tab CONTINUE taking these medications Instructions Each Dose to Equal  
 Morning Noon Evening Bedtime  
 aspirin delayed-release 81 mg tablet Your last dose was: Your next dose is: Take 1 Tab by mouth daily. 81 mg  
    
   
   
   
  
 BYSTOLIC 5 mg tablet Generic drug:  nebivolol Your last dose was: Your next dose is: Take 5 mg by mouth every evening. 5 mg CYMBALTA 30 mg capsule Generic drug:  DULoxetine Your last dose was: Your next dose is: Take 30 mg by mouth daily. 30 mg FARXIGA 10 mg Tab tablet Generic drug:  dapagliflozin Your last dose was: Your next dose is: Take 10 mg by mouth daily. 10 mg  
    
   
   
   
  
 furosemide 40 mg tablet Commonly known as:  LASIX Your last dose was: Your next dose is: Take 1 Tab by mouth daily. 40 mg  
    
   
   
   
  
 glipiZIDE 10 mg tablet Commonly known as:  Lawayne Early Your last dose was: Your next dose is: Take 10 mg by mouth two (2) times a day. 10 mg  
    
   
   
   
  
 insulin glargine 100 unit/mL (3 mL) Inpn Commonly known as:  Octavia Garza Your last dose was: Your next dose is:    
   
   
 85 Units by SubCUTAneous route nightly. 85 Units  
    
   
   
   
  
 prasugrel 10 mg tablet Commonly known as:  EFFIENT Your last dose was: Your next dose is: Take 1 Tab by mouth nightly. 10 mg  
    
   
   
   
  
 REPATHA PUSHTRONEX SC Your last dose was: Your next dose is:    
   
   
 10 mg by SubCUTAneous route. Every 2 weeks on Sunday; next dose due 5/20/18  
 10 mg  
    
   
   
   
  
  
STOP taking these medications   
 lisinopril 20 mg tablet Commonly known as:  Renzo Ames Where to Get Your Medications Information on where to get these meds will be given to you by the nurse or doctor. ! Ask your nurse or doctor about these medications  
  sacubitril-valsartan 24-26 mg tablet Discharge Instructions 200 [facility],  109 [facility]    [phone] [place]     [email or web address] Patient Discharge Instructions Pedro Gutierrez / [de-identified] : 1960 Admitted 2018 Discharged: 2018 Take Home Medications · It is important that you take the medication exactly as they are prescribed. · Keep your medication in the bottles provided by the pharmacist and keep a list of the medication names, dosages, and times to be taken in your wallet. · Do not take other medications without consulting your doctor. BRING ALL OF YOUR MEDICINES TO YOUR OFFICE VISIT with Dr. Katlin Orona. What to do at [facility] Recommended activity: Activity as tolerated. Follow-up with Cherelle Meyer MD in 1 week Lifestyle changes Do not smoke. Smoking increases your risk of another heart attack. If you need help quitting, talk to your doctor about stop-smoking programs and medicines. These can increase your chances of quitting for good. Eat a heart-healthy diet that is low in cholesterol, saturated fat, and salt, and is full of fruits, vegetables and whole-grains. Eat at least two servings of fish each week. You may get more details about how to eat healthy, but these tips can help you get started. Avoid colds and flu. Get a pneumococcal vaccine shot. If you have had one before, ask your doctor whether you need a second dose. Get a flu shot every fall. If you must be around people with colds or flu, wash your hands often. When should you call for help? Call 911 anytime you think you may need emergency care. For example, call if: You have signs of a heart attack. These may include: 
Chest pain or pressure. Sweating. Shortness of breath. Nausea or vomiting. Pain that spreads from the chest to the neck, jaw, or one or both shoulders or arms. Dizziness or lightheadedness. A fast or irregular pulse. After calling 911, chew 1 adult-strength aspirin. Wait for an ambulance. Do not try to drive yourself. You passed out (lost consciousness). You feel like you are having another heart attack. Call your doctor now or seek immediate medical care if: 
You have had any chest pain, even if it has gone away. You have new or increased shortness of breath. You are dizzy or lightheaded, or you feel like you may faint. Watch closely for changes in your health, and be sure to contact your doctor if you have any problem Information obtained by : 
I understand that if any problems occur once I am at home I am to contact my physician. I understand and acknowledge receipt of the instructions indicated above. R.N.'s Signature                                                                  Date/Time Patient or Representative Signature                                                          Date/Time 
 
 
Hui Hill MD 
 
    
. [address], [address]   [phone] [address]    [email or web address] Announcement We are excited to announce that we are making your provider's discharge notes available to you in [employer]. You will see these notes when they are completed and signed by the physician that discharged you from your recent hospital stay.   If you have any questions or concerns about any information you see in [employer], please call the Health Information Department where you were seen or reach out to your Primary Care Provider for more information about your plan of care. Introducing [facility]! Dear Dimitri Curtis: Thank you for requesting a [employer] account. Our records indicate that you already have an active [employer] account. You can access your account anytime at [email or web address] Did you know that you can access your hospital and ER discharge instructions at any time in [employer]? You can also review all of your test results from your hospital stay or ER visit. Additional Information If you have questions, please visit the Frequently Asked Questions section of the [employer] website at [email or web address] Remember, [employer] is NOT to be used for urgent needs. For medical emergencies, dial 911. Now available from your iPhone and Android! Introducing Amos [employer]p As a [facility] patient, I wanted to make you aware of our electronic visit tool called Am[employer] Harp. [facility] 24/7 allows you to connect within minutes with a medical provider 24 hours a day, seven days a week via a mobile device or tablet or logging into a secure website from your computer. You can access Amos Harp from anywhere in the [place]. A virtual visit might be right for you when you have a simple condition and feel like you just dont want to get out of bed, or cant get away from work for an appointment, when your regular [facility] provider is not available (evenings, weekends or holidays), or when youre out of town and need minor care. Electronic visits cost only $49 and if the [facility] 24/7 provider determines a prescription is needed to treat your condition, one can be electronically transmitted to a nearby pharmacy*. Please take a moment to enroll today if you have not already done so.   The enrollment process is free and takes just a few minutes. To enroll, please download the [employer] 24/7 cassandra to your tablet or phone, or visit [email or web address] to enroll on your computer. And, as an [address] [place] [address] patient with a [employer] account, the results of your visits will be scanned into your electronic medical record and your primary care provider will be able to view the scanned results. We urge you to continue to see your regular [employer] provider for your ongoing medical care. And while your primary care provider may not be the one available when you seek a [employer] virtual visit, the peace of mind you get from getting a real diagnosis real time can be priceless. For more information on [employer], view our Frequently Asked Questions (FAQs) at [email or web address]. Sincerely, 
 
Christy Gatica MD 
Chief Medical Officer [facility] Faina Monreal *:  certain medications cannot be prescribed via [employer] Unresulted Labs-Please follow up with your PCP about these lab tests Order Current Status EKG, 12 LEAD, INITIAL Preliminary result Providers Seen During Your Hospitalization Provider Specialty Primary office phone Ketty Krishna MD Cardiology [phone] Your Primary Care Physician (PCP) Primary Care Physician Office Phone Office Fax Deandre Jimenez [phone] You are allergic to the following No active allergies Recent Documentation Height Weight BMI Smoking Status 1.88 m 107.2 kg 30.34 kg/m2 Current Every Day Smoker Emergency Contacts Name Discharge Info Relation Home Work Mobile Dagmar King DISCHARGE CAREGIVER [3] Spouse [3] [phone] Patient Belongings  The following personal items are in your possession at time of discharge: 
  Dental Appliances: None  Visual Aid: None      Home Medications: None Jewelry: None  Clothing: None    Other Valuables: None Discharge Instructions Attachments/References HEART FAILURE: AVOIDING TRIGGERS (ENGLISH) Patient Handouts Avoiding Triggers With Heart Failure: Care Instructions Your Care Instructions Triggers are anything that make your heart failure flare up. A flare-up is also called \"sudden heart failure\" or \"acute heart failure. \" When you have a flare-up, fluid builds up in your lungs, and you have problems breathing. You might need to go to the hospital. By watching for changes in your condition and avoiding triggers, you can prevent heart failure flare-ups. Follow-up care is a key part of your treatment and safety. Be sure to make and go to all appointments, and call your doctor if you are having problems. It's also a good idea to know your test results and keep a list of the medicines you take. How can you care for yourself at home? Watch for changes in your weight and condition · Weigh yourself without clothing at the same time each day. Record your weight. Call your doctor if you have sudden weight gain, such as more than 2 to 3 pounds in a day or 5 pounds in a week. (Your doctor may suggest a different range of weight gain.) A sudden weight gain may mean that your heart failure is getting worse. · Keep a daily record of your symptoms. Write down any changes in how you feel, such as new shortness of breath, cough, or problems eating. Also record if your ankles are more swollen than usual and if you feel more tired than usual. Note anything that you ate or did that could have triggered these changes. Limit sodium Sodium causes your body to hold on to extra water. This may cause your heart failure symptoms to get worse. People get most of their sodium from processed foods. Fast food and restaurant meals also tend to be very high in sodium.  
· Your doctor may suggest that you limit sodium to 2,000 milligrams (mg) a day or less. That is less than 1 teaspoon of salt a day, including all the salt you eat in cooking or in packaged foods. · Read food labels on cans and food packages. They tell you how much sodium you get in one serving. Check the serving size. If you eat more than one serving, you are getting more sodium. · Be aware that sodium can come in forms other than salt, including monosodium glutamate (MSG), sodium citrate, and sodium bicarbonate (baking soda). MSG is often added to Asian food. You can sometimes ask for food without MSG or salt. · Slowly reducing salt will help you adjust to the taste. Take the salt shaker off the table. · Flavor your food with garlic, lemon juice, onion, vinegar, herbs, and spices instead of salt. Do not use soy sauce, steak sauce, onion salt, garlic salt, mustard, or ketchup on your food, unless it is labeled \"low-sodium\" or \"low-salt. \" 
· Make your own salad dressings, sauces, and ketchup without adding salt. · Use fresh or frozen ingredients, instead of canned ones, whenever you can. Choose low-sodium canned goods. · Eat less processed food and food from restaurants, including fast food. Exercise as directed Moderate, regular exercise is very good for your heart. It improves your blood flow and helps control your weight. But too much exercise can stress your heart and cause a heart failure flare-up. · Check with your doctor before you start an exercise program. 
· Walking is an easy way to get exercise. Start out slowly. Gradually increase the length and pace of your walk. Swimming, riding a bike, and using a treadmill are also good forms of exercise. · When you exercise, watch for signs that your heart is working too hard. You are pushing yourself too hard if you cannot talk while you are exercising. If you become short of breath or dizzy or have chest pain, stop, sit down, and rest. 
· Do not exercise when you do not feel well. Take medicines correctly · Take your medicines exactly as prescribed. Call your doctor if you think you are having a problem with your medicine. · Make a list of all the medicines you take. Include those prescribed to you by other doctors and any over-the-counter medicines, vitamins, or supplements you take. Take this list with you when you go to any doctor. · Take your medicines at the same time every day. It may help you to post a list of all the medicines you take every day and what time of day you take them. · Make taking your medicine as simple as you can. Plan times to take your medicines when you are doing other things, such as eating a meal or getting ready for bed. This will make it easier to remember to take your medicines. · Get organized. Use helpful tools, such as daily or weekly pill containers. When should you call for help? Call 911 if you have symptoms of sudden heart failure such as: 
? · You have severe trouble breathing. ? · You cough up pink, foamy mucus. ? · You have a new irregular or rapid heartbeat. ?Call your doctor now or seek immediate medical care if: 
? · You have new or increased shortness of breath. ? · You are dizzy or lightheaded, or you feel like you may faint. ? · You have sudden weight gain, such as more than 2 to 3 pounds in a day or 5 pounds in a week. (Your doctor may suggest a different range of weight gain.) ? · You have increased swelling in your legs, ankles, or feet. ? · You are suddenly so tired or weak that you cannot do your usual activities. ? Watch closely for changes in your health, and be sure to contact your doctor if you develop new symptoms. Where can you learn more? Go to http://olegario-justin.info/. Enter K310 in the search box to learn more about \"Avoiding Triggers With Heart Failure: Care Instructions. \" Current as of: September 21, 2016 Content Version: 11.4 © 6158-2069 Healthwise, Incorporated.  Care instructions adapted under license by [address] (which disclaims liability or warranty for this information). If you have questions about a medical condition or this instruction, always ask your healthcare professional. [address] any warranty or liability for your use of this information. Please provide this summary of care documentation to your next provider. Signatures-by signing, you are acknowledging that this After Visit Summary has been reviewed with you and you have received a copy. Patient Signature:  ____________________________________________________________ Date:  ____________________________________________________________  
  
Jimena Castilloamento Provider Signature:  ____________________________________________________________ Date:  ____________________________________________________________

## 2018-06-05 NOTE — PROGRESS NOTES
TRANSFER - IN REPORT:    Verbal report received from 2021 Janine Franks on Alexis Herrera  being received from cath lab procedure area  for routine progression of care. Report consisted of patients Situation, Background, Assessment and Recommendations(SBAR). Information from the following report(s) Kardex and Procedure Summary was reviewed with the receiving clinician. Opportunity for questions and clarification was provided. Assessment completed upon patients arrival to [address] [place] [address] and care assumed. Cardiac Cath Lab Recovery Arrival Note:    Alexis Herrera arrived to [facility] recovery area. Patient procedure= [facility] with stent placement. Patient on cardiac monitor, non-invasive blood pressure, SPO2 monitor. On  O2 @ 2 lpm via NC.  IV  of NS on pump at 159 ml/hr. Patient status doing well without problems. Patient is A&Ox 3. Patient reports no pain. PROCEDURE SITE CHECK:    Procedure site:without any bleeding and no hematoma, No pain/discomfort reported at procedure site. No change in patient status. Continue to monitor patient and status.

## 2018-06-05 NOTE — PROGRESS NOTES
1SBAR, Kardex, Recent Results and Cardiac Rhythm  630 TRANSFER - IN REPORT: SBAR, OR Summary, Procedure Summary, Intake/Output, MAR, Recent Results and Cardiac Rhythm NSR      Verbal report received from Shavon Mayfield RN(name) on Krupa Parmar  being received from Cath Lab(unit) for routine progression of care      Report consisted of patients Situation, Background, Assessment and   Recommendations(SBAR). Information from the following report(NSRSBAR, OR Summary, Procedure Summary, Intake/Output, MAR, Recent Results and Cardiac Rhythm NSR was reviewed with the receiving nurse. Opportunity for questions and clarification was provided. Assessment completed upon patients arrival to unit and care assumed. 1700 patient on floor on tele confirmed with monitor tech. Pt resting comfortably, dinner tray ordered. Pt on bed rest until 1830.    1852 patient ambulating around unit. Vital signs stable, no bleeding, no hematoma at right groin site. 1940 Dr. Kaylynn Partida paged, pt complaining of 6/10 chest pain post exertion    1945 Dr Kaylynn Partida ordered EKG, painkillers, and continue to monitor. 1930 Bedside shift change report given to Scarlett (oncoming nurse) by Noe Curry RN (offgoing nurse).  Report included the following informatiNSRSBAR, Kardex, Recent Results and Cardiac Rhythm NSR.     1933 On call Cardiovascular                    Problem: Heart Failure: Day 1  Goal: Activity/Safety  Outcome: Progressing Towards Goal  1830 patient of bedrest, up with assistance    Goal: Nutrition/Diet  Outcome: Progressing Towards Goal  Pt ate 100% of meal    Goal: Discharge Planning  Outcome: Progressing Towards Goal  Pt to be dc to home in care of spouse

## 2018-06-05 NOTE — IP AVS SNAPSHOT
[address] 
[phone] Patient: Marlene Bhardwaj MRN: [id number] RVN:[id number] A check rikki indicates which time of day the medication should be taken. My Medications START taking these medications Instructions Each Dose to Equal  
 Morning Noon Evening Bedtime  
 sacubitril-valsartan 24-26 mg tablet Commonly known as:  ENTRESTO Your last dose was: Your next dose is: Take 1 Tab by mouth every twelve (12) hours. 1 Tab CONTINUE taking these medications Instructions Each Dose to Equal  
 Morning Noon Evening Bedtime  
 aspirin delayed-release 81 mg tablet Your last dose was: Your next dose is: Take 1 Tab by mouth daily. 81 mg  
    
   
   
   
  
 BYSTOLIC 5 mg tablet Generic drug:  nebivolol Your last dose was: Your next dose is: Take 5 mg by mouth every evening. 5 mg CYMBALTA 30 mg capsule Generic drug:  DULoxetine Your last dose was: Your next dose is: Take 30 mg by mouth daily. 30 mg FARXIGA 10 mg Tab tablet Generic drug:  dapagliflozin Your last dose was: Your next dose is: Take 10 mg by mouth daily. 10 mg  
    
   
   
   
  
 furosemide 40 mg tablet Commonly known as:  LASIX Your last dose was: Your next dose is: Take 1 Tab by mouth daily. 40 mg  
    
   
   
   
  
 glipiZIDE 10 mg tablet Commonly known as:  Leonidas Best Your last dose was: Your next dose is: Take 10 mg by mouth two (2) times a day. 10 mg  
    
   
   
   
  
 insulin glargine 100 unit/mL (3 mL) Inpn Commonly known as:  Phillip Mixon Your last dose was: Your next dose is:    
   
   
 85 Units by SubCUTAneous route nightly. 85 Units  
    
   
   
   
  
 prasugrel 10 mg tablet Commonly known as:  EFFIENT Your last dose was: Your next dose is: Take 1 Tab by mouth nightly. 10 mg  
    
   
   
   
  
 REPATHA PUSHTRONEX SC Your last dose was: Your next dose is:    
   
   
 10 mg by SubCUTAneous route. Every 2 weeks on Sunday; next dose due 5/20/18  
 10 mg  
    
   
   
   
  
  
STOP taking these medications   
 lisinopril 20 mg tablet Commonly known as:  Garry Camara Where to Get Your Medications Information on where to get these meds will be given to you by the nurse or doctor. ! Ask your nurse or doctor about these medications  
  sacubitril-valsartan 24-26 mg tablet

## 2018-06-06 LAB
ANION GAP SERPL CALC-SCNC: 8 MMOL/L (ref 5–15)
ATRIAL RATE: 63 BPM
ATRIAL RATE: 67 BPM
BASOPHILS # BLD: 0 K/UL (ref 0–0.1)
BASOPHILS NFR BLD: 0 % (ref 0–1)
BUN SERPL-MCNC: 21 MG/DL (ref 6–20)
BUN/CREAT SERPL: 23 (ref 12–20)
CALCIUM SERPL-MCNC: 8.9 MG/DL (ref 8.5–10.1)
CALCULATED P AXIS, ECG09: 44 DEGREES
CALCULATED P AXIS, ECG09: 49 DEGREES
CALCULATED R AXIS, ECG10: -16 DEGREES
CALCULATED R AXIS, ECG10: -24 DEGREES
CALCULATED T AXIS, ECG11: 45 DEGREES
CALCULATED T AXIS, ECG11: 53 DEGREES
CHLORIDE SERPL-SCNC: 106 MMOL/L (ref 97–108)
CK MB CFR SERPL CALC: 7 % (ref 0–2.5)
CK MB SERPL-MCNC: 13.5 NG/ML (ref 5–25)
CK SERPL-CCNC: 193 U/L (ref 39–308)
CO2 SERPL-SCNC: 27 MMOL/L (ref 21–32)
CREAT SERPL-MCNC: 0.9 MG/DL (ref 0.7–1.3)
DIAGNOSIS, 93000: NORMAL
DIAGNOSIS, 93000: NORMAL
DIFFERENTIAL METHOD BLD: ABNORMAL
EOSINOPHIL # BLD: 0.3 K/UL (ref 0–0.4)
EOSINOPHIL NFR BLD: 3 % (ref 0–7)
ERYTHROCYTE [DISTWIDTH] IN BLOOD BY AUTOMATED COUNT: 13 % (ref 11.5–14.5)
GLUCOSE BLD STRIP.AUTO-MCNC: 145 MG/DL (ref 65–100)
GLUCOSE BLD STRIP.AUTO-MCNC: 150 MG/DL (ref 65–100)
GLUCOSE BLD STRIP.AUTO-MCNC: 183 MG/DL (ref 65–100)
GLUCOSE BLD STRIP.AUTO-MCNC: 187 MG/DL (ref 65–100)
GLUCOSE SERPL-MCNC: 56 MG/DL (ref 65–100)
HCT VFR BLD AUTO: 45 % (ref 36.6–50.3)
HGB BLD-MCNC: 14.7 G/DL (ref 12.1–17)
IMM GRANULOCYTES # BLD: 0 K/UL
IMM GRANULOCYTES NFR BLD AUTO: 0 %
LYMPHOCYTES # BLD: 4.1 K/UL (ref 0.8–3.5)
LYMPHOCYTES NFR BLD: 38 % (ref 12–49)
MCH RBC QN AUTO: 29.3 PG (ref 26–34)
MCHC RBC AUTO-ENTMCNC: 32.7 G/DL (ref 30–36.5)
MCV RBC AUTO: 89.8 FL (ref 80–99)
MONOCYTES # BLD: 0.7 K/UL (ref 0–1)
MONOCYTES NFR BLD: 6 % (ref 5–13)
NEUTS SEG # BLD: 5.8 K/UL (ref 1.8–8)
NEUTS SEG NFR BLD: 53 % (ref 32–75)
NRBC # BLD: 0 K/UL (ref 0–0.01)
NRBC BLD-RTO: 0 PER 100 WBC
P-R INTERVAL, ECG05: 190 MS
P-R INTERVAL, ECG05: 194 MS
PLATELET # BLD AUTO: 269 K/UL (ref 150–400)
PMV BLD AUTO: 9.7 FL (ref 8.9–12.9)
POTASSIUM SERPL-SCNC: 3.8 MMOL/L (ref 3.5–5.1)
Q-T INTERVAL, ECG07: 424 MS
Q-T INTERVAL, ECG07: 438 MS
QRS DURATION, ECG06: 112 MS
QRS DURATION, ECG06: 114 MS
QTC CALCULATION (BEZET), ECG08: 448 MS
QTC CALCULATION (BEZET), ECG08: 448 MS
RBC # BLD AUTO: 5.01 M/UL (ref 4.1–5.7)
RBC MORPH BLD: ABNORMAL
SERVICE CMNT-IMP: ABNORMAL
SODIUM SERPL-SCNC: 141 MMOL/L (ref 136–145)
TROPONIN I SERPL-MCNC: 7.03 NG/ML
VENTRICULAR RATE, ECG03: 63 BPM
VENTRICULAR RATE, ECG03: 67 BPM
WBC # BLD AUTO: 10.9 K/UL (ref 4.1–11.1)
WBC MORPH BLD: ABNORMAL

## 2018-06-06 PROCEDURE — 74011636637 HC RX REV CODE- 636/637: Performed by: INTERNAL MEDICINE

## 2018-06-06 PROCEDURE — 74011250637 HC RX REV CODE- 250/637: Performed by: INTERNAL MEDICINE

## 2018-06-06 PROCEDURE — 36415 COLL VENOUS BLD VENIPUNCTURE: CPT | Performed by: INTERNAL MEDICINE

## 2018-06-06 PROCEDURE — 80048 BASIC METABOLIC PNL TOTAL CA: CPT | Performed by: INTERNAL MEDICINE

## 2018-06-06 PROCEDURE — 82550 ASSAY OF CK (CPK): CPT | Performed by: INTERNAL MEDICINE

## 2018-06-06 PROCEDURE — 99218 HC RM OBSERVATION: CPT

## 2018-06-06 PROCEDURE — 84484 ASSAY OF TROPONIN QUANT: CPT | Performed by: INTERNAL MEDICINE

## 2018-06-06 PROCEDURE — 82962 GLUCOSE BLOOD TEST: CPT

## 2018-06-06 PROCEDURE — 85025 COMPLETE CBC W/AUTO DIFF WBC: CPT | Performed by: INTERNAL MEDICINE

## 2018-06-06 PROCEDURE — 93005 ELECTROCARDIOGRAM TRACING: CPT

## 2018-06-06 RX ADMIN — NEBIVOLOL HYDROCHLORIDE 5 MG: 5 TABLET ORAL at 17:09

## 2018-06-06 RX ADMIN — Medication 10 ML: at 15:08

## 2018-06-06 RX ADMIN — GLIPIZIDE 10 MG: 5 TABLET ORAL at 08:33

## 2018-06-06 RX ADMIN — ASPIRIN 81 MG: 81 TABLET, COATED ORAL at 08:34

## 2018-06-06 RX ADMIN — Medication 10 ML: at 06:32

## 2018-06-06 RX ADMIN — HYDROCODONE BITARTRATE AND ACETAMINOPHEN 1 TABLET: 5; 325 TABLET ORAL at 03:29

## 2018-06-06 RX ADMIN — INSULIN GLARGINE 85 UNITS: 100 INJECTION, SOLUTION SUBCUTANEOUS at 22:00

## 2018-06-06 RX ADMIN — Medication 10 ML: at 22:09

## 2018-06-06 RX ADMIN — DULOXETINE HYDROCHLORIDE 30 MG: 30 CAPSULE, DELAYED RELEASE ORAL at 08:34

## 2018-06-06 RX ADMIN — SACUBITRIL AND VALSARTAN 1 TABLET: 24; 26 TABLET, FILM COATED ORAL at 22:08

## 2018-06-06 RX ADMIN — GLIPIZIDE 10 MG: 5 TABLET ORAL at 17:09

## 2018-06-06 RX ADMIN — FUROSEMIDE 40 MG: 40 TABLET ORAL at 08:34

## 2018-06-06 RX ADMIN — PRASUGREL HYDROCHLORIDE 10 MG: 10 TABLET, FILM COATED ORAL at 08:34

## 2018-06-06 NOTE — PROGRESS NOTES
0730: Bedside shift change report given to Ashley DUCKWORTH (oncoming nurse) by Darrius Moreau (offgoing nurse). Report included the following information SBAR, Kardex, Procedure Summary, Intake/Output, MAR and Recent Results. 0900: S/w Dr. Belen Julian regarding overnight chest pain and new onset 1st degree block. No new orders. Will monitor. 1930: Bedside shift change report given to [address] (oncoming nurse) by Glenna Helms RN (offgoing nurse). Report included the following information SBAR, Kardex, Procedure Summary, Intake/Output, MAR and Recent Results. Problem: Cath Lab Procedures: Post-Cath Day of Procedure (Initiate SCIP Measures for Post-Op Care)  Goal: Medications  Outcome: Progressing Towards Goal  Pt receiving medications as ordered without complications. Will monitor. Goal: *Procedure site is without bleeding and signs of infection six hours post sheath removal  Outcome: Progressing Towards Goal  Cath site clean, dry, and intact without hematoma. Problem: Falls - Risk of  Goal: *Absence of Falls  Document Kecia Fall Risk and appropriate interventions in the flowsheet. Outcome: Progressing Towards Goal  Fall Risk Interventions:            Medication Interventions: Assess postural VS orthostatic hypotension, Teach patient to arise slowly, Patient to call before getting OOB                  Problem: Pressure Injury - Risk of  Goal: *Prevention of pressure injury  Document Carlos Scale and appropriate interventions in the flowsheet.    Outcome: Progressing Towards Goal  Pressure Injury Interventions:                      Nutrition Interventions: Document food/fluid/supplement intake, Discuss nutritional consult with provider, Offer support with meals,snacks and hydration

## 2018-06-06 NOTE — NURSE NAVIGATOR
Chart reviewed by Heart Failure Nurse Navigator. Heart Failure database completed. EF:  40%    ACEi/ARB: Lisinopril 20mg daily PTA; switched to Entresto 24/26 mg every 12 hours    BB: Bystolic 5mg daily    Aldosterone Antagonist: not indicated at this time    CRT not indicated at this time. NYHA Functional Class not assigned. Documentation requested for NYHA Functional Class via Provider message on [facility]    Heart Failure Teach Back in Patient Education. Heart Failure Avoiding Triggers on Discharge Instructions. Cardiologist: Dr. Do Medrano. READMISSION  5/18/18-5/23/18  Final coding-NSTEMI  followup by Dr. Do Medrano on 6/4/18.

## 2018-06-06 NOTE — PROGRESS NOTES
1930: Bedside shift change report given to [address] [place] [address] [place] (oncoming nurse) by Mame Birch (offgoing nurse). Report included the following information SBAR, Intake/Output, MAR, Recent Results, Med Rec Status and Cardiac Rhythm NSR.     0730: Bedside shift change report given to Jaskaran Nunn (oncoming nurse) by [address] [place] [address] Sharlene (offgoing nurse). Report included the following information SBAR, Intake/Output, MAR, Recent Results, Med Rec Status and Cardiac Rhythm NSR 1st Degree AVB. Problem: Cath Lab Procedures: Post-Cath Day of Procedure (Initiate SCIP Measures for Post-Op Care)  Goal: Activity/Safety  Outcome: Progressing Towards Goal  Pt up with 1 assist/ad keith. Goal: *Hemodynamically stable  Outcome: Progressing Towards Goal  Patient Vitals for the past 12 hrs:   Temp Pulse Resp BP SpO2   06/05/18 2015 - 66 - - -   06/05/18 1916 98.7 °F (37.1 °C) 64 16 126/64 92 %   06/05/18 1830 - 64 - 123/64 -   06/05/18 1800 - 67 - 118/71 -   06/05/18 1730 - 63 - 128/66 -   06/05/18 1700 98.1 °F (36.7 °C) 65 16 134/66 96 %   06/05/18 1630 - 66 - 120/63 96 %   06/05/18 1615 - 68 - 121/63 97 %   06/05/18 1600 - 69 - 126/60 96 %   06/05/18 1545 - 69 - 129/57 96 %   06/05/18 1530 - 67 - 124/64 97 %   06/05/18 1520 - 64 - 124/71 97 %   06/05/18 1515 - 62 - 118/58 97 %   06/05/18 1500 - 65 - 131/65 97 %   06/05/18 1445 - 64 - 137/65 97 %   06/05/18 1430 - 64 - 127/66 97 %   06/05/18 1415 - 64 - 125/66 96 %   06/05/18 1400 - 61 - 122/60 97 %   06/05/18 1345 - 67 - 125/57 94 %   06/05/18 1330 - 63 - 112/57 96 %   06/05/18 1323 - 65 18 118/54 92 %   06/05/18 1303 - 75 16 128/64 97 %       Problem: Falls - Risk of  Goal: *Absence of Falls  Document Kecia Fall Risk and appropriate interventions in the flowsheet.    Outcome: Progressing Towards Goal  Fall Risk Interventions:            Medication Interventions: Evaluate medications/consider consulting pharmacy, Patient to call before getting OOB, Teach patient to arise slowly                  Problem: Pressure Injury - Risk of  Goal: *Prevention of pressure injury  Document Carlos Scale and appropriate interventions in the flowsheet.    Outcome: Progressing Towards Goal  Pressure Injury Interventions:                      Nutrition Interventions: Document food/fluid/supplement intake, Discuss nutritional consult with provider

## 2018-06-06 NOTE — PROGRESS NOTES
Reason for Admission:   Unstable angina                   RRAT Score:          11           Plan for utilizing home health:      None indicated at this time                    Likelihood of Readmission:  low                         Transition of Care Plan:            Home with wife and PCP or cardiology follow-up    Patient is independent with ADL's and IADL's - lives with wife- confirmed address, phone number and PCP on file- [facility] [phone]. Observation letter explained and copy provided and signed copy on bedside chart. MARIELLE Lopze     Care Management Interventions  PCP Verified by CM:  Yes (saw last week)  Oleg Signup: Yes  Discharge Durable Medical Equipment: No (independent with ADL's and IADL's)  Physical Therapy Consult: No  Occupational Therapy Consult: No  Speech Therapy Consult: No  Current Support Network: Lives with Spouse  Confirm Follow Up Transport: Family (wife)  Plan discussed with Pt/Family/Caregiver: Yes  Discharge Location  Discharge Placement: Home

## 2018-06-06 NOTE — CARDIO/PULMONARY
Cardiac Rehab: Met with Nino Juvenal who is well known to cardiac rehab due to his recent stents. He declined additional educational material. He and his wife have begun walking at the mall together. He continues to declined cardiac rehab due to his work schedule. Discussed options for individualizing his cardiac rehab program. Also discussed using the American [employer] Association website for further education and lifestyle modification needs. He has no additional questions. He is awaiting an additional troponin level and then is hoping to go home. Will await his call for enrollment in cardiac rehab.  Jetta Essex, RN

## 2018-06-06 NOTE — PROGRESS NOTES
Cardiology Progress Note  2018     Admit Date: 2018  Admit Diagnosis: cc  Unstable angina (HCC)  CC: none currently    Assessment:   Active Problems:    Unstable angina ([address] (2018)      Plan:   Had chest soreness through the night, different and more clearly musculoskeletal compared to his anginal symptoms. No EKG changes, better with Norco and not SOB. Ambulate and check enzymes. ?home later. Volume status:euvolemic  Renal function: stable    For other plans, see orders.   Subjective: Alexandre Anna reports   Chest Pain:  [x]   none,  consistent with  []   non-cardiac   []   atypical   []   angina             [x]   none now    []      on-going  Dyspnea: [x]   none  []   at rest  []   with exertion     []   improved   []   unchanged   []   worsening  PND:       [x]   none  []   overnight    Orthopnea: [x]   none  []   improved  []   unchanged  []   worsening  Presyncope: [x]   none   []   improved    []   unchanged    []   worsening  Ambulated in hallway without symptoms  []   Yes  Ambulated in room without symptoms  []   Yes    Objective:    Physical Exam:  Overall VSSAF;    Visit Vitals    /65 (BP 1 Location: Left arm, BP Patient Position: At rest)    Pulse 62    Temp 98 °F (36.7 °C)    Resp 18    Ht 6' 2\" (1.88 m)    Wt 107.4 kg (236 lb 12.4 oz)    SpO2 94%    BMI 30.4 kg/m2     Temp (24hrs), Av.1 °F (36.7 °C), Min:97.5 °F (36.4 °C), Max:98.7 °F (37.1 °C)    Patient Vitals for the past 8 hrs:   Pulse   18 0740 62   18 0310 62    Patient Vitals for the past 8 hrs:   Resp   18 0740 18   18 0310 18    Patient Vitals for the past 8 hrs:   BP   18 0740 115/65   18 0310 114/61        Intake/Output Summary (Last 24 hours) at 18 0901  Last data filed at 18   Gross per 24 hour   Intake           682.88 ml   Output             1050 ml   Net          -367.12 ml       General Appearance: Well developed, well nourished, no acute distress. Ears/Nose/Mouth/Throat:   Normal MM; anicteric. JVP: WNL   Resp:   Lungs clear to auscultation bilaterally. Nl resp effort. Cardiovascular:  RRR, S1, S2 normal, no new murmur. No gallop or rub. Abdomen:   Soft, non-tender, bowel sounds are present. Extremities: No edema bilaterally. Skin:  Neuro: Warm and dry. A/O x3, grossly nonfocal    []      cath site intact w/o hematoma or bruit; distal pulse unchanged. Data Review:     Telemetry independently reviewed : [x]   sinus  []   chronic afib   []   par afib  []      NSVT    ECG independently reviewed:  [x]   NSR   [x]   no significant changes  []   no new ECG provided for review  Lab results reviewed as noted below. Current medications reviewed as noted below. No results for input(s): PH, PCO2, PO2 in the last 72 hours. No results for input(s): CPK, CKMB, TROIQ in the last 72 hours. Recent Labs      06/06/18   0325   NA  141   K  3.8   CL  106   CO2  27   BUN  21*   CREA  0.90   GLU  56*   CA  8.9   WBC  10.9   HGB  14.7   HCT  45.0   PLT  269     No results for input(s): SGOT, GPT, ALT, AP, TBIL, TBILI, TP, ALB, GLOB, GGT, AML, LPSE in the last 72 hours. No lab exists for component: AMYP, HLPSE  No results for input(s): INR, PTP, APTT in the last 72 hours. No lab exists for component: INREXT   No results for input(s): FE, TIBC, PSAT, FERR in the last 72 hours.    Lab Results   Component Value Date/Time    Glucose (POC) 150 (H) 06/06/2018 06:31 AM    Glucose (POC) 114 (H) 06/05/2018 09:17 PM    Glucose (POC) 100 06/05/2018 05:25 PM    Glucose (POC) 98 05/23/2018 06:46 AM    Glucose (POC) 74 05/23/2018 06:23 AM       Current Facility-Administered Medications   Medication Dose Route Frequency    DULoxetine (CYMBALTA) capsule 30 mg  30 mg Oral DAILY    glipiZIDE (GLUCOTROL) tablet 10 mg  10 mg Oral BID    nebivolol (BYSTOLIC) tablet 5 mg  5 mg Oral QPM    aspirin delayed-release tablet 81 mg  81 mg Oral DAILY    insulin glargine (LANTUS) injection 85 Units  85 Units SubCUTAneous QHS    . PHARMACY TO SUBSTITUTE PER PROTOCOL    Per Protocol    furosemide (LASIX) tablet 40 mg  40 mg Oral DAILY    sacubitril-valsartan (ENTRESTO) 24-26 mg tablet 1 Tab  1 Tab Oral Q12H    sodium chloride (NS) flush 5-10 mL  5-10 mL IntraVENous Q8H    sodium chloride (NS) flush 5-10 mL  5-10 mL IntraVENous PRN    acetaminophen (TYLENOL) tablet 650 mg  650 mg Oral Q4H PRN    prasugrel (EFFIENT) tablet 10 mg  10 mg Oral DAILY    HYDROcodone-acetaminophen (NORCO) 5-325 mg per tablet 1 Tab  1 Tab Oral Q4H PRN        Fifi Slater MD

## 2018-06-07 VITALS
BODY MASS INDEX: 30.33 KG/M2 | RESPIRATION RATE: 18 BRPM | HEART RATE: 63 BPM | TEMPERATURE: 98 F | SYSTOLIC BLOOD PRESSURE: 111 MMHG | WEIGHT: 236.33 LBS | OXYGEN SATURATION: 93 % | HEIGHT: 74 IN | DIASTOLIC BLOOD PRESSURE: 61 MMHG

## 2018-06-07 LAB
ATRIAL RATE: 61 BPM
CALCULATED P AXIS, ECG09: 35 DEGREES
CALCULATED R AXIS, ECG10: -14 DEGREES
CALCULATED T AXIS, ECG11: 39 DEGREES
CK MB CFR SERPL CALC: 3.5 % (ref 0–2.5)
CK MB SERPL-MCNC: 5.9 NG/ML (ref 5–25)
CK SERPL-CCNC: 171 U/L (ref 39–308)
DIAGNOSIS, 93000: NORMAL
GLUCOSE BLD STRIP.AUTO-MCNC: 73 MG/DL (ref 65–100)
GLUCOSE BLD STRIP.AUTO-MCNC: 95 MG/DL (ref 65–100)
P-R INTERVAL, ECG05: 202 MS
Q-T INTERVAL, ECG07: 450 MS
QRS DURATION, ECG06: 114 MS
QTC CALCULATION (BEZET), ECG08: 453 MS
SERVICE CMNT-IMP: NORMAL
SERVICE CMNT-IMP: NORMAL
TROPONIN I SERPL-MCNC: 6.31 NG/ML
VENTRICULAR RATE, ECG03: 61 BPM

## 2018-06-07 PROCEDURE — 84484 ASSAY OF TROPONIN QUANT: CPT | Performed by: INTERNAL MEDICINE

## 2018-06-07 PROCEDURE — 99218 HC RM OBSERVATION: CPT

## 2018-06-07 PROCEDURE — 82962 GLUCOSE BLOOD TEST: CPT

## 2018-06-07 PROCEDURE — 82550 ASSAY OF CK (CPK): CPT | Performed by: INTERNAL MEDICINE

## 2018-06-07 PROCEDURE — 36415 COLL VENOUS BLD VENIPUNCTURE: CPT | Performed by: INTERNAL MEDICINE

## 2018-06-07 PROCEDURE — 93005 ELECTROCARDIOGRAM TRACING: CPT

## 2018-06-07 PROCEDURE — 74011250637 HC RX REV CODE- 250/637: Performed by: INTERNAL MEDICINE

## 2018-06-07 RX ADMIN — PRASUGREL HYDROCHLORIDE 10 MG: 10 TABLET, FILM COATED ORAL at 08:43

## 2018-06-07 RX ADMIN — GLIPIZIDE 10 MG: 5 TABLET ORAL at 08:43

## 2018-06-07 RX ADMIN — SACUBITRIL AND VALSARTAN 1 TABLET: 24; 26 TABLET, FILM COATED ORAL at 08:43

## 2018-06-07 RX ADMIN — Medication 10 ML: at 07:06

## 2018-06-07 RX ADMIN — DULOXETINE HYDROCHLORIDE 30 MG: 30 CAPSULE, DELAYED RELEASE ORAL at 08:43

## 2018-06-07 RX ADMIN — ASPIRIN 81 MG: 81 TABLET, COATED ORAL at 08:43

## 2018-06-07 RX ADMIN — FUROSEMIDE 40 MG: 40 TABLET ORAL at 08:43

## 2018-06-07 NOTE — PROGRESS NOTES
Hospital follow-up PCP transitional care appointment has been scheduled with Dr. Bridger Vivas for Monday, 6/11/18 at 10:20 a.m. Pending patient discharge.   Solo Payan, Care Management Specialist.

## 2018-06-07 NOTE — DISCHARGE SUMMARY
Cardiology Discharge Summary     Patient ID:  Ciara Stout  [id number]  61 y.o.  1960    Admit Date: 6/5/2018    Discharge Date: 6/7/2018     Admitting Physician: Luiza Watson MD     Discharge Physician: Luiza Watson MD    Admission Diagnoses:  Unstable angina   Discharge Diagnoses:   NSTEMI  Acute on chronic systolic CHF  Hypertension  Type II DM hyperlipidemia    Discharge Condition: Good    Cardiology Procedures this Admission:  Left heart catheterization with PCI    Hospital Course: Admitted with increasing angina and recent NSTEMI with complex multivessel PCI. Cath on 6/5 shows progressive and hazy narrowing just beyond insertion of SVG to distal RCA and under expansion of stents in native Cx. Dilated Cx with larger high pressure inflations and stented proximally with 4.0 SANTOS. Stented distal RCA with 3.0 x 12 SANTOS. Has some vague pleuritic Lt upper CP with tenderness, but no angina and feels well. Enzymes trending down and no new EKG changes. Switched lisinopril to Abeba Manning. Consults: None    Discharge Exam:     Visit Vitals    /61 (BP 1 Location: Right arm, BP Patient Position: At rest)    Pulse 63    Temp 98 °F (36.7 °C)    Resp 18    Ht 6' 2\" (1.88 m)    Wt 107.2 kg (236 lb 5.3 oz)    SpO2 93%    BMI 30.34 kg/m2     General Appearance:  Well developed, well nourished, in no acute distress. Ears/Nose/Mouth/Throat:   Hearing grossly normal.         Neck: Supple. Chest:   Lungs clear to auscultation bilaterally. Normal resp effort. Cardiovascular:  Regular rate and rhythm, S1, S2 normal, no new murmur. Abdomen:   Soft, non-tender, bowel sounds are present. Extremities: No edema bilaterally. Neuro:  Skin: A/O x 3, grossly nonfocal. Normal mood/affect. Warm and dry.                Disposition: home    Patient Instructions:   Current Discharge Medication List      START taking these medications    Details   sacubitril-valsartan (ENTRESTO) 24 mg/26 mg tablet Take 1 Tab by mouth every twelve (12) hours. Qty: 60 Tab, Refills: 11         CONTINUE these medications which have NOT CHANGED    Details   furosemide (LASIX) 40 mg tablet Take 1 Tab by mouth daily. Qty: 30 Tab, Refills: 11      insulin glargine (LANTUS,BASAGLAR) 100 unit/mL (3 mL) inpn 85 Units by SubCUTAneous route nightly. evolocumab (REPATHA PUSHTRONEX SC) 10 mg by SubCUTAneous route. Every 2 weeks on Sunday; next dose due 5/20/18      dapagliflozin (FARXIGA) 10 mg tab tablet Take 10 mg by mouth daily. aspirin delayed-release 81 mg tablet Take 1 Tab by mouth daily. Qty: 30 Tab, Refills: 11      prasugrel (EFFIENT) 10 mg tablet Take 1 Tab by mouth nightly. Qty: 30 Tab, Refills: 11      nebivolol (BYSTOLIC) 5 mg tablet Take 5 mg by mouth every evening. DULoxetine (CYMBALTA) 30 mg capsule Take 30 mg by mouth daily. glipiZIDE (GLUCOTROL) 10 mg tablet Take 10 mg by mouth two (2) times a day. STOP taking these medications       lisinopril (PRINIVIL, ZESTRIL) 20 mg tablet Comments:   Reason for Stopping:               Referenced discharge instructions provided by nursing for diet and activity.     Follow-up with Dr. Maty Daniel in 1 week     Signed:  Rocael Beal MD  6/7/2018  9:02 AM

## 2018-06-07 NOTE — PROGRESS NOTES
Verbal shift change report given to Lise Joshi (oncoming nurse) by Holli Rubio  (offgoing nurse). Report included the following information SBAR, Kardex, Procedure Summary, MAR, Accordion, Recent Results and Cardiac Rhythm NSR. Problem: Cath Lab Procedures: Post-Cath Day 1  Goal: Discharge Planning  Outcome: Progressing Towards Goal  Patient ready for discharge. Problem: Falls - Risk of  Goal: *Absence of Falls  Document Kecia Fall Risk and appropriate interventions in the flowsheet. Outcome: Progressing Towards Goal  Fall Risk Interventions:            Medication Interventions: Evaluate medications/consider consulting pharmacy      I have reviewed discharge instructions with the patient and spouse. The patient and spouse verbalized understanding.

## 2018-06-07 NOTE — DISCHARGE INSTRUCTIONS
[facility],  [facility]    [phone]  [address]     [email or web address]    Patient Discharge Instructions    Amairani Bowen / [de-identified] : 1960    Admitted 2018 Discharged: 2018     Take Home Medications              · It is important that you take the medication exactly as they are prescribed. · Keep your medication in the bottles provided by the pharmacist and keep a list of the medication names, dosages, and times to be taken in your wallet. · Do not take other medications without consulting your doctor. BRING ALL OF YOUR MEDICINES TO YOUR OFFICE VISIT with Dr. Abelino Gonzales. What to do at Home    Recommended activity: Activity as tolerated. Follow-up with Reji Davila MD in 1 week        Lifestyle changes  Do not smoke. Smoking increases your risk of another heart attack. If you need help quitting, talk to your doctor about stop-smoking programs and medicines. These can increase your chances of quitting for good. Eat a heart-healthy diet that is low in cholesterol, saturated fat, and salt, and is full of fruits, vegetables and whole-grains. Eat at least two servings of fish each week. You may get more details about how to eat healthy, but these tips can help you get started. Avoid colds and flu. Get a pneumococcal vaccine shot. If you have had one before, ask your doctor whether you need a second dose. Get a flu shot every fall. If you must be around people with colds or flu, wash your hands often. When should you call for help? Call 911 anytime you think you may need emergency care. For example, call if:  You have signs of a heart attack. These may include:  Chest pain or pressure. Sweating. Shortness of breath. Nausea or vomiting. Pain that spreads from the chest to the neck, jaw, or one or both shoulders or arms. Dizziness or lightheadedness. A fast or irregular pulse. After calling 911, chew 1 adult-strength aspirin. Wait for an ambulance.  Do not try to drive yourself. You passed out (lost consciousness). You feel like you are having another heart attack. Call your doctor now or seek immediate medical care if:  You have had any chest pain, even if it has gone away. You have new or increased shortness of breath. You are dizzy or lightheaded, or you feel like you may faint. Watch closely for changes in your health, and be sure to contact your doctor if you have any problem      Information obtained by :  I understand that if any problems occur once I am at home I am to contact my physician. I understand and acknowledge receipt of the instructions indicated above. R.N.'s Signature                                                                  Date/Time                                                                                                                                              Patient or Representative Signature                                                          Date/Time      Marce Bowen MD         [place] [address]   [phone]  [address]    [email or web address]

## 2018-06-07 NOTE — NURSE NAVIGATOR
[facility] noted discharge order. Attempted to make post discharge followup appt. Awaiting return call. Post discharge follow up appt scheduled with Dr. Rodney Ocampo 6/14/18 @ 12n.   Appt placed on MARLI.    Alexandria Valera RN-CHFN/NANDO

## 2018-06-07 NOTE — PROGRESS NOTES
0730: Bedside and Verbal shift change report given to Geneva Cr (oncoming nurse) by Naa Bangura (offgoing nurse). Report included the following information SBAR, Kardex, Procedure Summary, Intake/Output, MAR, Recent Results and Cardiac Rhythm NSR. Problem: Cath Lab Procedures: Discharge Outcomes  Goal: *Stable cardiac rhythm  Outcome: Progressing Towards Goal  Remains in NSR with 1* AVB  Goal: *Hemodynamically stable  Outcome: Progressing Towards Goal  Visit Vitals    /58 (BP 1 Location: Left arm, BP Patient Position: At rest)    Pulse 67    Temp 98.3 °F (36.8 °C)    Resp 16    Ht 6' 2\" (1.88 m)    Wt 107.4 kg (236 lb 12.4 oz)    SpO2 95%    BMI 30.4 kg/m2         Problem: Falls - Risk of  Goal: *Absence of Falls  Document Kecia Fall Risk and appropriate interventions in the flowsheet. Fall Risk Interventions:     Medication Interventions: Evaluate medications/consider consulting pharmacy, Teach patient to arise slowly    Up ad keith with steady gait. Call bell and personal effects within reach. Bed locked and in low position. Non skid footwear in place. Problem: Pressure Injury - Risk of  Goal: *Prevention of pressure injury  Document Carlos Scale and appropriate interventions in the flowsheet.    Outcome: Progressing Towards Goal  Pressure Injury Interventions:     Turns self at appropriate intervals; skin assessed Q4H; blood glucose controlled      Nutrition Interventions: Document food/fluid/supplement intake

## 2018-06-07 NOTE — PROGRESS NOTES
[address] June 7, 2018       RE: Alexandre Anna      To Whom It May Concern,    This is to certify that Alexandre Anna may may return to work on Monday, 6/11/18. Sultana Bob has been in the hospital from 6/5/18 until 6/7/18. Please feel free to contact my office if you have any questions or concerns. Thank you for your assistance in this matter.       Sincerely,  Nita Merino RN  [phone]

## 2018-06-08 ENCOUNTER — PATIENT OUTREACH (OUTPATIENT)
Dept: OTHER | Age: 58
End: 2018-06-08

## 2018-06-08 LAB
ATRIAL RATE: 63 BPM
CALCULATED P AXIS, ECG09: 47 DEGREES
CALCULATED R AXIS, ECG10: -13 DEGREES
CALCULATED T AXIS, ECG11: 53 DEGREES
DIAGNOSIS, 93000: NORMAL
P-R INTERVAL, ECG05: 202 MS
Q-T INTERVAL, ECG07: 436 MS
QRS DURATION, ECG06: 114 MS
QTC CALCULATION (BEZET), ECG08: 446 MS
VENTRICULAR RATE, ECG03: 63 BPM

## 2018-06-19 ENCOUNTER — APPOINTMENT (OUTPATIENT)
Dept: GENERAL RADIOLOGY | Age: 58
DRG: 247 | End: 2018-06-19
Attending: EMERGENCY MEDICINE
Payer: COMMERCIAL

## 2018-06-19 ENCOUNTER — HOSPITAL ENCOUNTER (INPATIENT)
Age: 58
LOS: 2 days | Discharge: HOME | DRG: 247 | End: 2018-06-21
Attending: EMERGENCY MEDICINE | Admitting: INTERNAL MEDICINE
Payer: COMMERCIAL

## 2018-06-19 DIAGNOSIS — I21.4 NSTEMI (NON-ST ELEVATED MYOCARDIAL INFARCTION) (HCC): Primary | ICD-10-CM

## 2018-06-19 LAB
ALBUMIN SERPL-MCNC: 3.5 G/DL (ref 3.5–5)
ALBUMIN/GLOB SERPL: 1 {RATIO} (ref 1.1–2.2)
ALP SERPL-CCNC: 121 U/L (ref 45–117)
ALT SERPL-CCNC: 36 U/L (ref 12–78)
ANION GAP SERPL CALC-SCNC: 10 MMOL/L (ref 5–15)
AST SERPL-CCNC: 59 U/L (ref 15–37)
ATRIAL RATE: 114 BPM
ATRIAL RATE: 74 BPM
BASOPHILS # BLD: 0.1 K/UL (ref 0–0.1)
BASOPHILS NFR BLD: 1 % (ref 0–1)
BILIRUB SERPL-MCNC: 0.3 MG/DL (ref 0.2–1)
BUN SERPL-MCNC: 20 MG/DL (ref 6–20)
BUN/CREAT SERPL: 24 (ref 12–20)
CALCIUM SERPL-MCNC: 8.2 MG/DL (ref 8.5–10.1)
CALCULATED P AXIS, ECG09: 44 DEGREES
CALCULATED P AXIS, ECG09: 46 DEGREES
CALCULATED R AXIS, ECG10: -16 DEGREES
CALCULATED R AXIS, ECG10: -16 DEGREES
CALCULATED T AXIS, ECG11: 62 DEGREES
CALCULATED T AXIS, ECG11: 75 DEGREES
CHLORIDE SERPL-SCNC: 109 MMOL/L (ref 97–108)
CK MB CFR SERPL CALC: 11.5 % (ref 0–2.5)
CK MB SERPL-MCNC: 90.4 NG/ML (ref 5–25)
CK SERPL-CCNC: 788 U/L (ref 39–308)
CO2 SERPL-SCNC: 24 MMOL/L (ref 21–32)
CREAT SERPL-MCNC: 0.84 MG/DL (ref 0.7–1.3)
D DIMER PPP FEU-MCNC: 0.19 MG/L FEU (ref 0–0.65)
DIAGNOSIS, 93000: NORMAL
DIAGNOSIS, 93000: NORMAL
DIFFERENTIAL METHOD BLD: ABNORMAL
EOSINOPHIL # BLD: 0.4 K/UL (ref 0–0.4)
EOSINOPHIL NFR BLD: 4 % (ref 0–7)
ERYTHROCYTE [DISTWIDTH] IN BLOOD BY AUTOMATED COUNT: 13.7 % (ref 11.5–14.5)
GLOBULIN SER CALC-MCNC: 3.4 G/DL (ref 2–4)
GLUCOSE BLD STRIP.AUTO-MCNC: 134 MG/DL (ref 65–100)
GLUCOSE BLD STRIP.AUTO-MCNC: 135 MG/DL (ref 65–100)
GLUCOSE BLD STRIP.AUTO-MCNC: 77 MG/DL (ref 65–100)
GLUCOSE BLD STRIP.AUTO-MCNC: 87 MG/DL (ref 65–100)
GLUCOSE BLD STRIP.AUTO-MCNC: 97 MG/DL (ref 65–100)
GLUCOSE SERPL-MCNC: 116 MG/DL (ref 65–100)
HCT VFR BLD AUTO: 44 % (ref 36.6–50.3)
HGB BLD-MCNC: 14.9 G/DL (ref 12.1–17)
IMM GRANULOCYTES # BLD: 0.1 K/UL (ref 0–0.04)
IMM GRANULOCYTES NFR BLD AUTO: 1 % (ref 0–0.5)
INR PPP: 1 (ref 0.9–1.1)
LYMPHOCYTES # BLD: 3.8 K/UL (ref 0.8–3.5)
LYMPHOCYTES NFR BLD: 37 % (ref 12–49)
MCH RBC QN AUTO: 29.8 PG (ref 26–34)
MCHC RBC AUTO-ENTMCNC: 33.9 G/DL (ref 30–36.5)
MCV RBC AUTO: 88 FL (ref 80–99)
MONOCYTES # BLD: 0.6 K/UL (ref 0–1)
MONOCYTES NFR BLD: 6 % (ref 5–13)
NEUTS SEG # BLD: 5.4 K/UL (ref 1.8–8)
NEUTS SEG NFR BLD: 52 % (ref 32–75)
NRBC # BLD: 0 K/UL (ref 0–0.01)
NRBC BLD-RTO: 0 PER 100 WBC
P-R INTERVAL, ECG05: 198 MS
P-R INTERVAL, ECG05: 200 MS
PLATELET # BLD AUTO: 250 K/UL (ref 150–400)
PMV BLD AUTO: 9.7 FL (ref 8.9–12.9)
POTASSIUM SERPL-SCNC: 3.5 MMOL/L (ref 3.5–5.1)
PROT SERPL-MCNC: 6.9 G/DL (ref 6.4–8.2)
PROTHROMBIN TIME: 10 SEC (ref 9–11.1)
Q-T INTERVAL, ECG07: 386 MS
Q-T INTERVAL, ECG07: 466 MS
QRS DURATION, ECG06: 114 MS
QRS DURATION, ECG06: 94 MS
QTC CALCULATION (BEZET), ECG08: 428 MS
QTC CALCULATION (BEZET), ECG08: 457 MS
RBC # BLD AUTO: 5 M/UL (ref 4.1–5.7)
SERVICE CMNT-IMP: ABNORMAL
SERVICE CMNT-IMP: ABNORMAL
SERVICE CMNT-IMP: NORMAL
SODIUM SERPL-SCNC: 143 MMOL/L (ref 136–145)
TROPONIN I SERPL-MCNC: 19.8 NG/ML
TROPONIN I SERPL-MCNC: 7.61 NG/ML
VENTRICULAR RATE, ECG03: 58 BPM
VENTRICULAR RATE, ECG03: 74 BPM
WBC # BLD AUTO: 10.3 K/UL (ref 4.1–11.1)

## 2018-06-19 PROCEDURE — 71045 X-RAY EXAM CHEST 1 VIEW: CPT

## 2018-06-19 PROCEDURE — 74011250636 HC RX REV CODE- 250/636: Performed by: INTERNAL MEDICINE

## 2018-06-19 PROCEDURE — 80053 COMPREHEN METABOLIC PANEL: CPT | Performed by: EMERGENCY MEDICINE

## 2018-06-19 PROCEDURE — B2131ZZ FLUOROSCOPY OF MULTIPLE CORONARY ARTERY BYPASS GRAFTS USING LOW OSMOLAR CONTRAST: ICD-10-PCS | Performed by: INTERNAL MEDICINE

## 2018-06-19 PROCEDURE — 74011250636 HC RX REV CODE- 250/636

## 2018-06-19 PROCEDURE — C1874 STENT, COATED/COV W/DEL SYS: HCPCS

## 2018-06-19 PROCEDURE — 74011000258 HC RX REV CODE- 258: Performed by: INTERNAL MEDICINE

## 2018-06-19 PROCEDURE — 85610 PROTHROMBIN TIME: CPT | Performed by: EMERGENCY MEDICINE

## 2018-06-19 PROCEDURE — 74011250637 HC RX REV CODE- 250/637: Performed by: EMERGENCY MEDICINE

## 2018-06-19 PROCEDURE — 77030018729 HC ELECTRD DEFIB PAD CARD -B

## 2018-06-19 PROCEDURE — 027035Z DILATION OF CORONARY ARTERY, ONE ARTERY WITH TWO DRUG-ELUTING INTRALUMINAL DEVICES, PERCUTANEOUS APPROACH: ICD-10-PCS | Performed by: INTERNAL MEDICINE

## 2018-06-19 PROCEDURE — 77030013797 HC KT TRNSDUC PRSSR EDWD -A

## 2018-06-19 PROCEDURE — 77030013715 HC INFL SYS MRTM -B

## 2018-06-19 PROCEDURE — 74011000250 HC RX REV CODE- 250: Performed by: INTERNAL MEDICINE

## 2018-06-19 PROCEDURE — 74011250637 HC RX REV CODE- 250/637

## 2018-06-19 PROCEDURE — 4A023N7 MEASUREMENT OF CARDIAC SAMPLING AND PRESSURE, LEFT HEART, PERCUTANEOUS APPROACH: ICD-10-PCS | Performed by: INTERNAL MEDICINE

## 2018-06-19 PROCEDURE — 96375 TX/PRO/DX INJ NEW DRUG ADDON: CPT

## 2018-06-19 PROCEDURE — 99153 MOD SED SAME PHYS/QHP EA: CPT

## 2018-06-19 PROCEDURE — C1887 CATHETER, GUIDING: HCPCS

## 2018-06-19 PROCEDURE — 84484 ASSAY OF TROPONIN QUANT: CPT | Performed by: EMERGENCY MEDICINE

## 2018-06-19 PROCEDURE — 93005 ELECTROCARDIOGRAM TRACING: CPT

## 2018-06-19 PROCEDURE — 74011000250 HC RX REV CODE- 250

## 2018-06-19 PROCEDURE — 74011250636 HC RX REV CODE- 250/636: Performed by: EMERGENCY MEDICINE

## 2018-06-19 PROCEDURE — 77030004532 HC CATH ANGI DX IMP BSC -A

## 2018-06-19 PROCEDURE — 82962 GLUCOSE BLOOD TEST: CPT

## 2018-06-19 PROCEDURE — 99152 MOD SED SAME PHYS/QHP 5/>YRS: CPT

## 2018-06-19 PROCEDURE — 82553 CREATINE MB FRACTION: CPT | Performed by: INTERNAL MEDICINE

## 2018-06-19 PROCEDURE — B2151ZZ FLUOROSCOPY OF LEFT HEART USING LOW OSMOLAR CONTRAST: ICD-10-PCS | Performed by: INTERNAL MEDICINE

## 2018-06-19 PROCEDURE — 96365 THER/PROPH/DIAG IV INF INIT: CPT

## 2018-06-19 PROCEDURE — 77030013744

## 2018-06-19 PROCEDURE — 85025 COMPLETE CBC W/AUTO DIFF WBC: CPT | Performed by: EMERGENCY MEDICINE

## 2018-06-19 PROCEDURE — 99285 EMERGENCY DEPT VISIT HI MDM: CPT

## 2018-06-19 PROCEDURE — C1769 GUIDE WIRE: HCPCS

## 2018-06-19 PROCEDURE — 36415 COLL VENOUS BLD VENIPUNCTURE: CPT | Performed by: INTERNAL MEDICINE

## 2018-06-19 PROCEDURE — 74011636637 HC RX REV CODE- 636/637: Performed by: INTERNAL MEDICINE

## 2018-06-19 PROCEDURE — 93459 L HRT ART/GRFT ANGIO: CPT

## 2018-06-19 PROCEDURE — 77030004533 HC CATH ANGI DX IMP BSC -B

## 2018-06-19 PROCEDURE — 93041 RHYTHM ECG TRACING: CPT

## 2018-06-19 PROCEDURE — B2111ZZ FLUOROSCOPY OF MULTIPLE CORONARY ARTERIES USING LOW OSMOLAR CONTRAST: ICD-10-PCS | Performed by: INTERNAL MEDICINE

## 2018-06-19 PROCEDURE — 74011250637 HC RX REV CODE- 250/637: Performed by: INTERNAL MEDICINE

## 2018-06-19 PROCEDURE — 92928 PRQ TCAT PLMT NTRAC ST 1 LES: CPT

## 2018-06-19 PROCEDURE — 65620000000 HC RM CCU GENERAL

## 2018-06-19 PROCEDURE — 74011636320 HC RX REV CODE- 636/320: Performed by: INTERNAL MEDICINE

## 2018-06-19 PROCEDURE — 85379 FIBRIN DEGRADATION QUANT: CPT | Performed by: EMERGENCY MEDICINE

## 2018-06-19 RX ORDER — SODIUM CHLORIDE 0.9 % (FLUSH) 0.9 %
10 SYRINGE (ML) INJECTION AS NEEDED
Status: DISCONTINUED | OUTPATIENT
Start: 2018-06-19 | End: 2018-06-19

## 2018-06-19 RX ORDER — HEPARIN SODIUM 200 [USP'U]/100ML
1000 INJECTION, SOLUTION INTRAVENOUS CONTINUOUS
Status: DISCONTINUED | OUTPATIENT
Start: 2018-06-19 | End: 2018-06-19

## 2018-06-19 RX ORDER — FENTANYL CITRATE 50 UG/ML
25-200 INJECTION, SOLUTION INTRAMUSCULAR; INTRAVENOUS
Status: DISCONTINUED | OUTPATIENT
Start: 2018-06-19 | End: 2018-06-19

## 2018-06-19 RX ORDER — GUAIFENESIN 100 MG/5ML
324 LIQUID (ML) ORAL
Status: COMPLETED | OUTPATIENT
Start: 2018-06-19 | End: 2018-06-19

## 2018-06-19 RX ORDER — INSULIN GLARGINE 100 [IU]/ML
42 INJECTION, SOLUTION SUBCUTANEOUS
Status: DISCONTINUED | OUTPATIENT
Start: 2018-06-19 | End: 2018-06-21 | Stop reason: HOSPADM

## 2018-06-19 RX ORDER — GUAIFENESIN 100 MG/5ML
LIQUID (ML) ORAL
Status: DISPENSED
Start: 2018-06-19 | End: 2018-06-19

## 2018-06-19 RX ORDER — SODIUM CHLORIDE 9 MG/ML
3 INJECTION, SOLUTION INTRAVENOUS CONTINUOUS
Status: DISCONTINUED | OUTPATIENT
Start: 2018-06-19 | End: 2018-06-19

## 2018-06-19 RX ORDER — BACITRACIN 500 UNIT/G
PACKET (EA) TOPICAL
Status: COMPLETED
Start: 2018-06-19 | End: 2018-06-19

## 2018-06-19 RX ORDER — FUROSEMIDE 40 MG/1
40 TABLET ORAL DAILY
Status: DISCONTINUED | OUTPATIENT
Start: 2018-06-19 | End: 2018-06-21 | Stop reason: HOSPADM

## 2018-06-19 RX ORDER — INSULIN GLARGINE 100 [IU]/ML
43 INJECTION, SOLUTION SUBCUTANEOUS
Status: DISCONTINUED | OUTPATIENT
Start: 2018-06-19 | End: 2018-06-21 | Stop reason: HOSPADM

## 2018-06-19 RX ORDER — CLOPIDOGREL 300 MG/1
600 TABLET, FILM COATED ORAL AS NEEDED
Status: DISCONTINUED | OUTPATIENT
Start: 2018-06-19 | End: 2018-06-19

## 2018-06-19 RX ORDER — NEBIVOLOL 5 MG/1
5 TABLET ORAL EVERY EVENING
Status: DISCONTINUED | OUTPATIENT
Start: 2018-06-19 | End: 2018-06-21 | Stop reason: HOSPADM

## 2018-06-19 RX ORDER — MAGNESIUM SULFATE 100 %
4 CRYSTALS MISCELLANEOUS AS NEEDED
Status: DISCONTINUED | OUTPATIENT
Start: 2018-06-19 | End: 2018-06-21 | Stop reason: HOSPADM

## 2018-06-19 RX ORDER — SODIUM CHLORIDE 9 MG/ML
1.5 INJECTION, SOLUTION INTRAVENOUS CONTINUOUS
Status: DISPENSED | OUTPATIENT
Start: 2018-06-19 | End: 2018-06-19

## 2018-06-19 RX ORDER — MIDAZOLAM HYDROCHLORIDE 1 MG/ML
.5-1 INJECTION, SOLUTION INTRAMUSCULAR; INTRAVENOUS
Status: DISCONTINUED | OUTPATIENT
Start: 2018-06-19 | End: 2018-06-19

## 2018-06-19 RX ORDER — DEXTROSE 50 % IN WATER (D50W) INTRAVENOUS SYRINGE
12.5-25 AS NEEDED
Status: DISCONTINUED | OUTPATIENT
Start: 2018-06-19 | End: 2018-06-21 | Stop reason: HOSPADM

## 2018-06-19 RX ORDER — HEPARIN SODIUM 1000 [USP'U]/ML
1000-5000 INJECTION, SOLUTION INTRAVENOUS; SUBCUTANEOUS
Status: DISCONTINUED | OUTPATIENT
Start: 2018-06-19 | End: 2018-06-19

## 2018-06-19 RX ORDER — ASPIRIN 81 MG/1
81 TABLET ORAL DAILY
Status: DISCONTINUED | OUTPATIENT
Start: 2018-06-19 | End: 2018-06-21 | Stop reason: HOSPADM

## 2018-06-19 RX ORDER — MORPHINE SULFATE 10 MG/ML
INJECTION, SOLUTION INTRAMUSCULAR; INTRAVENOUS
Status: DISCONTINUED
Start: 2018-06-19 | End: 2018-06-19

## 2018-06-19 RX ORDER — INSULIN LISPRO 100 [IU]/ML
INJECTION, SOLUTION INTRAVENOUS; SUBCUTANEOUS EVERY 6 HOURS
Status: DISCONTINUED | OUTPATIENT
Start: 2018-06-19 | End: 2018-06-21 | Stop reason: HOSPADM

## 2018-06-19 RX ORDER — PRASUGREL 10 MG/1
30 TABLET, FILM COATED ORAL ONCE
Status: COMPLETED | OUTPATIENT
Start: 2018-06-19 | End: 2018-06-19

## 2018-06-19 RX ORDER — MORPHINE SULFATE 10 MG/ML
2 INJECTION, SOLUTION INTRAMUSCULAR; INTRAVENOUS
Status: COMPLETED | OUTPATIENT
Start: 2018-06-19 | End: 2018-06-19

## 2018-06-19 RX ORDER — CHOLECALCIFEROL (VITAMIN D3) 125 MCG
2000 CAPSULE ORAL DAILY
COMMUNITY

## 2018-06-19 RX ORDER — SODIUM CHLORIDE 0.9 % (FLUSH) 0.9 %
5-10 SYRINGE (ML) INJECTION EVERY 8 HOURS
Status: DISCONTINUED | OUTPATIENT
Start: 2018-06-19 | End: 2018-06-21 | Stop reason: HOSPADM

## 2018-06-19 RX ORDER — PRASUGREL 10 MG/1
TABLET, FILM COATED ORAL
Status: COMPLETED
Start: 2018-06-19 | End: 2018-06-19

## 2018-06-19 RX ORDER — VERAPAMIL HYDROCHLORIDE 2.5 MG/ML
2.5 INJECTION, SOLUTION INTRAVENOUS
Status: DISCONTINUED | OUTPATIENT
Start: 2018-06-19 | End: 2018-06-19

## 2018-06-19 RX ORDER — ATROPINE SULFATE 0.1 MG/ML
1 INJECTION INTRAVENOUS AS NEEDED
Status: DISCONTINUED | OUTPATIENT
Start: 2018-06-19 | End: 2018-06-19

## 2018-06-19 RX ORDER — GLIPIZIDE 5 MG/1
10 TABLET ORAL 2 TIMES DAILY
Status: DISCONTINUED | OUTPATIENT
Start: 2018-06-19 | End: 2018-06-21 | Stop reason: HOSPADM

## 2018-06-19 RX ORDER — DULOXETIN HYDROCHLORIDE 30 MG/1
30 CAPSULE, DELAYED RELEASE ORAL DAILY
Status: DISCONTINUED | OUTPATIENT
Start: 2018-06-19 | End: 2018-06-21 | Stop reason: HOSPADM

## 2018-06-19 RX ORDER — PRASUGREL 10 MG/1
10 TABLET, FILM COATED ORAL
Status: DISCONTINUED | OUTPATIENT
Start: 2018-06-19 | End: 2018-06-21 | Stop reason: HOSPADM

## 2018-06-19 RX ORDER — LIDOCAINE HYDROCHLORIDE 10 MG/ML
10-30 INJECTION, SOLUTION EPIDURAL; INFILTRATION; INTRACAUDAL; PERINEURAL
Status: COMPLETED | OUTPATIENT
Start: 2018-06-19 | End: 2018-06-19

## 2018-06-19 RX ORDER — SODIUM CHLORIDE 0.9 % (FLUSH) 0.9 %
5-10 SYRINGE (ML) INJECTION AS NEEDED
Status: DISCONTINUED | OUTPATIENT
Start: 2018-06-19 | End: 2018-06-21 | Stop reason: HOSPADM

## 2018-06-19 RX ADMIN — SACUBITRIL AND VALSARTAN 1 TABLET: 24; 26 TABLET, FILM COATED ORAL at 21:36

## 2018-06-19 RX ADMIN — DULOXETINE HYDROCHLORIDE 30 MG: 30 CAPSULE, DELAYED RELEASE ORAL at 08:53

## 2018-06-19 RX ADMIN — PRASUGREL 10 MG: 10 TABLET, FILM COATED ORAL at 21:36

## 2018-06-19 RX ADMIN — SODIUM CHLORIDE 3 ML/KG/HR: 900 INJECTION, SOLUTION INTRAVENOUS at 04:30

## 2018-06-19 RX ADMIN — SACUBITRIL AND VALSARTAN 1 TABLET: 24; 26 TABLET, FILM COATED ORAL at 09:32

## 2018-06-19 RX ADMIN — IOPAMIDOL 226 ML: 755 INJECTION, SOLUTION INTRAVENOUS at 05:30

## 2018-06-19 RX ADMIN — NITROGLYCERIN 1 INCH: 20 OINTMENT TOPICAL at 03:54

## 2018-06-19 RX ADMIN — ASPIRIN 81 MG: 81 TABLET, COATED ORAL at 08:53

## 2018-06-19 RX ADMIN — Medication 2000 UNITS: at 04:29

## 2018-06-19 RX ADMIN — MIDAZOLAM HYDROCHLORIDE 2 MG: 1 INJECTION, SOLUTION INTRAMUSCULAR; INTRAVENOUS at 04:37

## 2018-06-19 RX ADMIN — LIDOCAINE HYDROCHLORIDE 10 ML: 10 INJECTION, SOLUTION EPIDURAL; INFILTRATION; INTRACAUDAL; PERINEURAL at 04:40

## 2018-06-19 RX ADMIN — IOPAMIDOL 50 ML: 755 INJECTION, SOLUTION INTRAVENOUS at 05:16

## 2018-06-19 RX ADMIN — FUROSEMIDE 40 MG: 40 TABLET ORAL at 08:53

## 2018-06-19 RX ADMIN — NITROGLYCERIN 200 MCG: 5 INJECTION, SOLUTION INTRAVENOUS at 05:23

## 2018-06-19 RX ADMIN — GLIPIZIDE 10 MG: 5 TABLET ORAL at 09:32

## 2018-06-19 RX ADMIN — INSULIN GLARGINE 42 UNITS: 100 INJECTION, SOLUTION SUBCUTANEOUS at 21:39

## 2018-06-19 RX ADMIN — BACITRACIN 1 PACKET: 500 OINTMENT TOPICAL at 09:32

## 2018-06-19 RX ADMIN — BIVALIRUDIN 186.55 MG/HR: 250 INJECTION, POWDER, LYOPHILIZED, FOR SOLUTION INTRAVENOUS at 05:14

## 2018-06-19 RX ADMIN — FENTANYL CITRATE 50 MCG: 50 INJECTION, SOLUTION INTRAMUSCULAR; INTRAVENOUS at 04:37

## 2018-06-19 RX ADMIN — MORPHINE SULFATE 2 MG: 10 INJECTION INTRAVENOUS at 03:11

## 2018-06-19 RX ADMIN — Medication 10 ML: at 06:49

## 2018-06-19 RX ADMIN — NITROGLYCERIN 1 INCH: 20 OINTMENT TOPICAL at 03:53

## 2018-06-19 RX ADMIN — GLIPIZIDE 10 MG: 5 TABLET ORAL at 17:11

## 2018-06-19 RX ADMIN — SODIUM CHLORIDE 1.5 ML/KG/HR: 900 INJECTION, SOLUTION INTRAVENOUS at 05:30

## 2018-06-19 RX ADMIN — PRASUGREL 30 MG: 10 TABLET, FILM COATED ORAL at 05:28

## 2018-06-19 RX ADMIN — Medication 10 ML: at 21:36

## 2018-06-19 RX ADMIN — ASPIRIN 81 MG 324 MG: 81 TABLET ORAL at 03:09

## 2018-06-19 RX ADMIN — INSULIN GLARGINE 43 UNITS: 100 INJECTION, SOLUTION SUBCUTANEOUS at 21:39

## 2018-06-19 RX ADMIN — NEBIVOLOL HYDROCHLORIDE 5 MG: 5 TABLET ORAL at 17:11

## 2018-06-19 NOTE — PROGRESS NOTES
Problem: Falls - Risk of  Goal: *Absence of Falls  Document Kecia Fall Risk and appropriate interventions in the flowsheet. Outcome: Progressing Towards Goal  Fall Risk Interventions:            Medication Interventions: Assess postural VS orthostatic hypotension, Patient to call before getting OOB                  Problem: Pressure Injury - Risk of  Goal: *Prevention of pressure injury  Document Carlos Scale and appropriate interventions in the flowsheet.   Outcome: Progressing Towards Goal  Pressure Injury Interventions:  Sensory Interventions: Keep linens dry and wrinkle-free

## 2018-06-19 NOTE — PROGRESS NOTES
Problem: Discharge Planning  Goal: *Discharge to safe environment  Outcome: Progressing Towards Goal  See CM Notes CRM: Tesha Lacey, MPH; Z: [phone]

## 2018-06-19 NOTE — PROGRESS NOTES
0730: Report received from Liliam, [address] [place] [address] Blvd: Sheath pulled, pt tolerated well. No bleeding, no hematoma. 1930: Bedside shift change report given to DONITA Ricketts (oncoming nurse) by John Mcknight RN (offgoing nurse). Report included the following information SBAR, Kardex, ED Summary, Procedure Summary, Intake/Output, MAR, Recent Results, Med Rec Status and Cardiac Rhythm NSR.

## 2018-06-19 NOTE — INTERDISCIPLINARY ROUNDS
IDR/SLIDR Summary          Patient: Per Jane MRN: [de-identified]    Age: 60 y.o. YOB: 1960 Room/Bed: [address] [place] [address]   Admit Diagnosis: NSTEMI (non-ST elevated myocardial infarction) ([facility]  [facility] (non-ST elevated [facility]  Principal Diagnosis: <principal problem not specified>   Goals: Groin checks  Education    Readmission: YES  Quality Measure: AMI  VTE Prophylaxis: Chemical  Influenza Vaccine screening completed? YES  Pneumococcal Vaccine screening completed? NO  Mobility needs: No   Nutrition plan:Yes  Consults:Case Management    Financial concerns:Yes  Escalated to CM? YES  RRAT Score: 13   Interventions:H2H  Testing due for pt today? YES  LOS: 0 days Expected length of stay ?  days  Discharge plan: Home   PCP: Eyal Cisneros NP  Transportation needs: No    Days before discharge:one day until discharge   Discharge disposition: Home    Signed:     Geovani Fong  6/19/2018  7:53 PM

## 2018-06-19 NOTE — CARDIO/PULMONARY
Cardiac Rehab: MI education folder given to Jose R Juarez on last admission. Educated using teach back method. Reviewed MI diagnosis. Reviewed risk factors for CAD to include the following: family history, elevated BMI, hyperlipidemia, hypertension, diabetes, stress, and smoking. Smoking Cessation Program link added to AVS. Patient reports he has not tried to quit smoking but has changed his diet and tries to walk more. Discussed Heart Healthy/Low Sodium (2000 mg) diet. Reviewed the importance of medication compliance. Discussed follow up appointments with cardiologist, signs and symptoms of angina, and what to report to physician after discharge. Emphasized the value of cardiac rehab. Discussed Cardiac Rehab Program format, benefits, and encouraged enrollment to assist with risk modification and management. Patient declined enrollment because of his work schedule. Information for [facility] CR on AVS and patient informed of there extended evening hours. Jose R Juarez verbalized understanding with questions answered.  Artmatteo Forrest RN

## 2018-06-19 NOTE — PROGRESS NOTES
Reason for Admission:   NSTEMI                  RRAT Score:     13             Do you (patient/family) have any concerns for transition/discharge? None              Plan for utilizing home health:     Patient declined [facility] visit due to spouse being a cardiac nurse    Likelihood of readmission? Moderate: has an extensive cardiac history            Transition of Care Plan:      [facility] as pharmacy preference. Patient's wife is a cardiac nurse and director of nursing education so declined the [facility] visit. Patient will discharge home in care of spouse and adult daughter. Care Management Interventions  PCP Verified by CM: Yes (Last saw NP Jonathon 2 weeks ago)  Palliative Care Criteria Met (RRAT>21 & CHF Dx)?: No  Mode of Transport at Discharge:  Other (see comment) (Spouse at bedside)  Transition of Care Consult (CM Consult): Discharge Planning (will discharge home in care of spouse)  Russell Can: Yes  Discharge Durable Medical Equipment: No (Has DME of: CPAPA)  Health Maintenance Reviewed: Yes (CM met with patient at bedside with patient alert and oriented x 4)  Physical Therapy Consult: No  Occupational Therapy Consult: No  Current Support Network: Lives with Spouse, Family Lives [place], Own Home (Lives with wife and daughter )  Confirm Follow Up Transport: Self (Independent in ADLs, to include driving)  Plan discussed with Pt/Family/Caregiver: Yes  Freedom of Choice Offered: Yes  [address] Provided?: No  Discharge Location  Discharge Placement: Home with family assistance (Will discharge home in care of spouse to a 3 story home, with 2nd floor bedroom, 3-4 exterior steps, 15 interior steps)    CRM: Richelle Stoll, MPH; Z: [phone]

## 2018-06-19 NOTE — PROGRESS NOTES
1930: Bedside shift report received from [facility]. VSS.  0700: EKG obtained. 0730: Bedside and Verbal shift change report given to Jimy Cronin Dr (oncoming nurse) by Christina Wise (offgoing nurse). Report included the following information SBAR, Kardex, Intake/Output, MAR, Accordion and Recent Results.

## 2018-06-19 NOTE — IP AVS SNAPSHOT
[facility] Zee Wallace 13 
[phone] Patient: Sonia Earl MRN: [id number] KGO:0/45/0045 About your hospitalization You were admitted on:  June 19, 2018 You last received care in the:  [facility] 4 CV SERVICES UNIT You were discharged on:  June 21, 2018 Why you were hospitalized Your primary diagnosis was:  Not on File Your diagnoses also included:  Nstemi (Non-St Elevated Myocardial Infarction) (Hcc) Follow-up Information Follow up With Details Comments Contact [facility] Cardiopulmonary Rehab Call To enroll in Cardiac Rehab Helen Oneal 144. [facility], [facility] 
[phone] Gladis Aguilar NP In 1 month post hospital [address] [address] [phone] Sandra Brown MD On 7/2/2018 @ 0930am, post hospital [address]  [address] [facility] 
[phone] Discharge Orders None A check irkki indicates which time of day the medication should be taken. My Medications CONTINUE taking these medications Instructions Each Dose to Equal  
 Morning Noon Evening Bedtime  
 aspirin delayed-release 81 mg tablet Your last dose was: Your next dose is: Take 1 Tab by mouth daily. 81 mg  
    
   
   
   
  
 BYSTOLIC 5 mg tablet Generic drug:  nebivolol Your last dose was: Your next dose is: Take 5 mg by mouth every evening. 5 mg CYMBALTA 30 mg capsule Generic drug:  DULoxetine Your last dose was: Your next dose is: Take 30 mg by mouth daily. 30 mg FARXIGA 10 mg Tab tablet Generic drug:  dapagliflozin Your last dose was: Your next dose is: Take 10 mg by mouth daily. 10 mg  
    
   
   
   
  
 furosemide 40 mg tablet Commonly known as:  LASIX Your last dose was: Your next dose is: Take 1 Tab by mouth daily. 40 mg  
    
   
   
   
  
 glipiZIDE 10 mg tablet Commonly known as:  Cristal Landaverde Your last dose was: Your next dose is: Take 10 mg by mouth two (2) times a day. 10 mg  
    
   
   
   
  
 insulin glargine 100 unit/mL (3 mL) Inpn Commonly known as:  Laurel Conn Your last dose was: Your next dose is:    
   
   
 85 Units by SubCUTAneous route nightly. 85 Units  
    
   
   
   
  
 multivits,Stress Formula-Zinc tablet Your last dose was: Your next dose is: Take 1 Tab by mouth daily. 1 Tab  
    
   
   
   
  
 prasugrel 10 mg tablet Commonly known as:  EFFIENT Your last dose was: Your next dose is: Take 1 Tab by mouth nightly. 10 mg  
    
   
   
   
  
 REPATHA PUSHTRONEX SC Your last dose was: Your next dose is:    
   
   
 10 mg by SubCUTAneous route every thirty (30) days. Every 2 weeks on ; next dose due 18  
 10 mg  
    
   
   
   
  
 sacubitril-valsartan 24-26 mg tablet Commonly known as:  ENTRESTO Your last dose was: Your next dose is: Take 1 Tab by mouth every twelve (12) hours. 1 Tab VITAMIN D3 2,000 unit Tab Generic drug:  cholecalciferol (vitamin D3) Your last dose was: Your next dose is: Take 2,000 Units by mouth daily. 2000 Units Discharge Instructions [address] [place] [address],  [address] [place] [address]    [phone] [address]     [email or web address] Patient Discharge Instructions Neena Boykin / [de-identified] : 1960 Admitted 2018 Discharged: 2018 Take Home Medications · It is important that you take the medication exactly as they are prescribed. · Keep your medication in the bottles provided by the pharmacist and keep a list of the medication names, dosages, and times to be taken in your wallet. · Do not take other medications without consulting your doctor. BRING ALL OF YOUR MEDICINES TO YOUR OFFICE VISIT with . What to do at [facility] Recommended activity: Activity as tolerated. Follow-up with Toma Cervantes MD in 2 week. Lifestyle changes Do not smoke. Smoking increases your risk of another heart attack. If you need help quitting, talk to your doctor about stop-smoking programs and medicines. These can increase your chances of quitting for good. Eat a heart-healthy diet that is low in cholesterol, saturated fat, and salt, and is full of fruits, vegetables and whole-grains. Eat at least two servings of fish each week. You may get more details about how to eat healthy, but these tips can help you get started. Avoid colds and flu. Get a pneumococcal vaccine shot. If you have had one before, ask your doctor whether you need a second dose. Get a flu shot every fall. If you must be around people with colds or flu, wash your hands often. When should you call for help? Call 911 anytime you think you may need emergency care. For example, call if: 
You have signs of a heart attack. These may include: 
Chest pain or pressure. Sweating. Shortness of breath. Nausea or vomiting. Pain that spreads from the chest to the neck, jaw, or one or both shoulders or arms. Dizziness or lightheadedness. A fast or irregular pulse. After calling 911, chew 1 adult-strength aspirin. Wait for an ambulance. Do not try to drive yourself. You passed out (lost consciousness). You feel like you are having another heart attack. Call your doctor now or seek immediate medical care if: 
You have had any chest pain, even if it has gone away. You have new or increased shortness of breath. You are dizzy or lightheaded, or you feel like you may faint. Watch closely for changes in your health, and be sure to contact your doctor if you have any problem Information obtained by : 
I understand that if any problems occur once I am at home I am to contact my physician. I understand and acknowledge receipt of the instructions indicated above. R.N.'s Signature                                                                  Date/Time Patient or Representative Signature                                                          Date/Time 
 
 
Rocael Beal MD 
 
    
[address], [address]   [phone] [address]    [email or web address] Announcement We are excited to announce that we are making your provider's discharge notes available to you in Aveso. You will see these notes when they are completed and signed by the physician that discharged you from your recent hospital stay. If you have any questions or concerns about any information you see in Aveso, please call the Health Information Department where you were seen or reach out to your Primary Care Provider for more information about your plan of care. Introducing [facility]! Dear Beena Whitt: Thank you for requesting a Aveso account. Our records indicate that you already have an active Aveso account. You can access your account anytime at [email or web address] Did you know that you can access your hospital and ER discharge instructions at any time in Aveso? You can also review all of your test results from your hospital stay or ER visit. Additional Information If you have questions, please visit the Frequently Asked Questions section of the MyChart website at [email or web address] Remember, MDCapsule is NOT to be used for urgent needs. For medical emergencies, dial 911. Now available from your iPhone and Android! Introducing Amos Harp As a [facility] patient, I wanted to make you aware of our electronic visit tool called Amos RoblesWego. Medbox/Solmentum allows you to connect within minutes with a medical provider 24 hours a day, seven days a week via a mobile device or tablet or logging into a secure website from your computer. You can access Amos Roblesfin from anywhere in the [place]. A virtual visit might be right for you when you have a simple condition and feel like you just dont want to get out of bed, or cant get away from work for an appointment, when your regular [facility] provider is not available (evenings, weekends or holidays), or when youre out of town and need minor care. Electronic visits cost only $49 and if the Medbox/Solmentum provider determines a prescription is needed to treat your condition, one can be electronically transmitted to a nearby pharmacy*. Please take a moment to enroll today if you have not already done so. The enrollment process is free and takes just a few minutes. To enroll, please download the Medbox/Solmentum cassandra to your tablet or phone, or visit [email or web address] to enroll on your computer. And, as an [address] [place] [address] patient with a Audiodraft account, the results of your visits will be scanned into your electronic medical record and your primary care provider will be able to view the scanned results. We urge you to continue to see your regular [facility] provider for your ongoing medical care.   And while your primary care provider may not be the one available when you seek a Amos Harp virtual visit, the peace of mind you get from getting a real diagnosis real time can be priceless. For more information on Amos Harp, view our Frequently Asked Questions (FAQs) at [email or web address]. Sincerely, 
 
Ashley Noble MD 
Chief Medical Officer [employer] *:  certain medications cannot be prescribed via Amos Hrap Unresulted tests-please follow up with your PCP on these results Procedure/Test Authorizing Provider CBC WITH AUTOMATED DIFF Rafael Manuel MD  
 CBC WITH AUTOMATED DIFF Vikki Roman MD  
 CK W/ CKMB & INDEX Rafael Mar, MD  
 CK W/ CKMB & INDEX Rafael MD Mar  
 [address] [place] MD Arelis  
 ECG RHYTHM ANALYSIS ADULT Rafael Manuel MD  
 EKG, 12 LEAD, INITIAL Rafael Manuel MD  
 EKG, 12 LEAD, Doron Woodard MD  
 LIPID PANEL Rafael Manuel MD  
 METABOLIC PANEL, BASIC Rafael Manuel MD  
 METABOLIC PANEL, Edison Leyden, MD  
 PROTHROMBIN TIME + INR Vikki Roman MD  
 PTT Vikki Roman MD  
 SAMPLES BEING HELD Vikki Roman MD  
 TROPONIN I Rafael Manuel MD  
 TROPONIN I Vikki Roman MD  
 XR Day Rosenthal MD  
  
Providers Seen During Your Hospitalization Provider Specialty Primary office phone Vikki Roman MD Emergency Medicine [phone] Rafael Manuel MD Cardiology [phone] Your Primary Care Physician (PCP) Primary Care Physician Office Phone Office Fax Lennie Swenson [phone] You are allergic to the following No active allergies Recent Documentation Height Weight BMI Smoking Status 1.854 m 105.8 kg 30.77 kg/m2 Current Every Day Smoker Emergency Contacts Name Discharge Info Relation Home Work Mobile Dagmar King DISCHARGE CAREGIVER [3] Spouse [3] [phone] Patient Belongings The following personal items are in your possession at time of discharge: 
  Dental Appliances: None  Visual Aid: Contacts, At home      Home Medications: None   Jewelry: None  Clothing: At bedside    Other Valuables: None Discharge Instructions Attachments/References HEART FAILURE: AVOIDING TRIGGERS (ENGLISH) Patient Handouts Avoiding Triggers With Heart Failure: Care Instructions Your Care Instructions Triggers are anything that make your heart failure flare up. A flare-up is also called \"sudden heart failure\" or \"acute heart failure. \" When you have a flare-up, fluid builds up in your lungs, and you have problems breathing. You might need to go to the hospital. By watching for changes in your condition and avoiding triggers, you can prevent heart failure flare-ups. Follow-up care is a key part of your treatment and safety. Be sure to make and go to all appointments, and call your doctor if you are having problems. It's also a good idea to know your test results and keep a list of the medicines you take. How can you care for yourself at home? Watch for changes in your weight and condition · Weigh yourself without clothing at the same time each day. Record your weight. Call your doctor if you have sudden weight gain, such as more than 2 to 3 pounds in a day or 5 pounds in a week. (Your doctor may suggest a different range of weight gain.) A sudden weight gain may mean that your heart failure is getting worse. · Keep a daily record of your symptoms. Write down any changes in how you feel, such as new shortness of breath, cough, or problems eating.  Also record if your ankles are more swollen than usual and if you feel more tired than usual. Note anything that you ate or did that could have triggered these changes. Limit sodium Sodium causes your body to hold on to extra water. This may cause your heart failure symptoms to get worse. People get most of their sodium from processed foods. Fast food and restaurant meals also tend to be very high in sodium. · Your doctor may suggest that you limit sodium to 2,000 milligrams (mg) a day or less. That is less than 1 teaspoon of salt a day, including all the salt you eat in cooking or in packaged foods. · Read food labels on cans and food packages. They tell you how much sodium you get in one serving. Check the serving size. If you eat more than one serving, you are getting more sodium. · Be aware that sodium can come in forms other than salt, including monosodium glutamate (MSG), sodium citrate, and sodium bicarbonate (baking soda). MSG is often added to Asian food. You can sometimes ask for food without MSG or salt. · Slowly reducing salt will help you adjust to the taste. Take the salt shaker off the table. · Flavor your food with garlic, lemon juice, onion, vinegar, herbs, and spices instead of salt. Do not use soy sauce, steak sauce, onion salt, garlic salt, mustard, or ketchup on your food, unless it is labeled \"low-sodium\" or \"low-salt. \" 
· Make your own salad dressings, sauces, and ketchup without adding salt. · Use fresh or frozen ingredients, instead of canned ones, whenever you can. Choose low-sodium canned goods. · Eat less processed food and food from restaurants, including fast food. Exercise as directed Moderate, regular exercise is very good for your heart. It improves your blood flow and helps control your weight. But too much exercise can stress your heart and cause a heart failure flare-up. · Check with your doctor before you start an exercise program. 
· Walking is an easy way to get exercise. Start out slowly.  Gradually increase the length and pace of your walk. Swimming, riding a bike, and using a treadmill are also good forms of exercise. · When you exercise, watch for signs that your heart is working too hard. You are pushing yourself too hard if you cannot talk while you are exercising. If you become short of breath or dizzy or have chest pain, stop, sit down, and rest. 
· Do not exercise when you do not feel well. Take medicines correctly · Take your medicines exactly as prescribed. Call your doctor if you think you are having a problem with your medicine. · Make a list of all the medicines you take. Include those prescribed to you by other doctors and any over-the-counter medicines, vitamins, or supplements you take. Take this list with you when you go to any doctor. · Take your medicines at the same time every day. It may help you to post a list of all the medicines you take every day and what time of day you take them. · Make taking your medicine as simple as you can. Plan times to take your medicines when you are doing other things, such as eating a meal or getting ready for bed. This will make it easier to remember to take your medicines. · Get organized. Use helpful tools, such as daily or weekly pill containers. When should you call for help? Call 911 if you have symptoms of sudden heart failure such as: 
? · You have severe trouble breathing. ? · You cough up pink, foamy mucus. ? · You have a new irregular or rapid heartbeat. ?Call your doctor now or seek immediate medical care if: 
? · You have new or increased shortness of breath. ? · You are dizzy or lightheaded, or you feel like you may faint. ? · You have sudden weight gain, such as more than 2 to 3 pounds in a day or 5 pounds in a week. (Your doctor may suggest a different range of weight gain.) ? · You have increased swelling in your legs, ankles, or feet. ? · You are suddenly so tired or weak that you cannot do your usual activities. ?Watch closely for changes in your health, and be sure to contact your doctor if you develop new symptoms. Where can you learn more? Go to http://olegario-justin.info/. Enter R293 in the search box to learn more about \"Avoiding Triggers With Heart Failure: Care Instructions. \" Current as of: September 21, 2016 Content Version: 11.4 © 7257-7952 [employer]. Care instructions adapted under license by ZeroPercent.us (which disclaims liability or warranty for this information). If you have questions about a medical condition or this instruction, always ask your healthcare professional. [place] [address] any warranty or liability for your use of this information. Please provide this summary of care documentation to your next provider. Signatures-by signing, you are acknowledging that this After Visit Summary has been reviewed with you and you have received a copy. Patient Signature:  ____________________________________________________________ Date:  ____________________________________________________________  
  
Annabelle Severino Provider Signature:  ____________________________________________________________ Date:  ____________________________________________________________

## 2018-06-19 NOTE — ED TRIAGE NOTES
Triage note: Pt arrives ambulatory with c/o left sided chest pain associated with mild GI upset and nausea. Pt had two MIs in May and 2 more stents placed for similar symptoms 6/5/18.

## 2018-06-19 NOTE — PROGRESS NOTES
8474:   Cardiac Cath Lab Procedure Area Arrival Note:    Vernell Oakes arrived to Cardiac Cath Lab, Procedure Area. Patient identifiers verified with NAME and DATE OF BIRTH. Procedure verified with patient. Consent forms verified. Allergies verified. Patient informed of procedure and plan of care. Questions answered with review. Patient voiced understanding of procedure and plan of care. Patient on cardiac monitor, non-invasive blood pressure, SPO2 monitor. On O2 @ 2 lpm via NC.  IV of NS on pump at 320 ml/hr. Patient status chest pain, emergent cath. Patient is A&Ox 4. Patient reports 6/10 chest pain. Patient medicated during procedure with orders obtained and verified by Dr. Brianna Hart. Refer to patients Cardiac Cath Lab PROCEDURE REPORT for vital signs, assessment, status, and response during procedure, printed at end of case. Printed report on chart or scanned into chart.

## 2018-06-19 NOTE — ED NOTES
Dr. Ken Murrieta, Cardiologist wants patient to go to the cath lab.    0425: Patient left the floor to the cath lab. Consent form has been signed by patient and Dr. Ken Murrieta.

## 2018-06-19 NOTE — ED PROVIDER NOTES
HPI Comments: 60 y.o. male with past medical history significant for DM, hypercholesterolemia, HTN, CAD with h/o multiple MIs, s/p CABG and multiple stents, who presents ambulatory to the ED accompanied by wife (who is a cardiac nurse), with chief complaint of 6/10 left sided chest pain that woke him up from sleep prior to arrival. He describes the chest pain as feeling like \"indigestion\", which is constant. Additionally c/o nausea associated with the pain. Denies diagnosed history of heart burn or GERD, but reports that he experienced similar CP with his last MI in 05/2018. Patient has had a total of 5 MI's, the first of which was at age 44. Patient is followed by Dr. Soniya Khan, and he last had three stents placed when he had his heart attack in May. Last cardiac catheterization was in 6/5/2018 where his stents were dilated. Pt reports that he has been compliant with all of his regularly scheduled medications, including Effient. He specifically denies any shortness of breath, vomiting, lightheadedness, or dizziness. There are no other acute medical concerns at this time. Social hx: Positive for Tobacco use (current every day smoker); Positive for EtOH use; Negative for Illicit Drug Abuse   PCP: Delroy Dawson NP  Cardiology: Dr. Maty Daniel     Note written by Ryann Hanson. Bethanie Bess, as dictated by Daren Heller MD 3:08 AM     The history is provided by the patient, the spouse and medical records. No  was used. Past Medical History:   Diagnosis Date    Diabetes ([facility] [address]     Glaucoma     right eye    Hypercholesteremia     Hypertension     Kidney stones     MI, old        Past Surgical History:   Procedure Laterality Date    HX CORONARY ARTERY BYPASS GRAFT      HX CORONARY STENT PLACEMENT      HX WISDOM TEETH EXTRACTION           No family history on file.     Social History     Social History    Marital status:      Spouse name: N/A    Number of children: N/A    Years of education: N/A     Occupational History    Not on file. Social History Main Topics    Smoking status: Current Every Day Smoker    Smokeless tobacco: Not on file    Alcohol use Yes    Drug use: No    Sexual activity: Not on file     Other Topics Concern    Not on file     Social History Narrative         ALLERGIES: Review of patient's allergies indicates no known allergies. Review of Systems   Constitutional: Negative for activity change, chills and fever. HENT: Negative for nosebleeds, sore throat, trouble swallowing and voice change. Eyes: Negative for visual disturbance. Respiratory: Negative for shortness of breath. Cardiovascular: Positive for chest pain. Negative for palpitations. Gastrointestinal: Positive for nausea. Negative for abdominal pain, constipation, diarrhea and vomiting. Genitourinary: Negative for difficulty urinating, dysuria, hematuria and urgency. Musculoskeletal: Negative for back pain, neck pain and neck stiffness. Skin: Negative for color change. Allergic/Immunologic: Negative for immunocompromised state. Neurological: Negative for dizziness, seizures, syncope, weakness, light-headedness, numbness and headaches. Psychiatric/Behavioral: Negative for behavioral problems, confusion, hallucinations, self-injury and suicidal ideas. Vitals:    06/19/18 0253 06/19/18 0306 06/19/18 0353   BP: (!) 146/91 127/78 137/78   Pulse: 74 72 72   Resp: 16 15    Temp: 98.3 °F (36.8 °C)     SpO2: 95% 95%    Weight: 106.6 kg (235 lb)     Height: 6' 1\" (1.854 m)              Physical Exam   Constitutional: He is oriented to person, place, and time. He appears well-developed and well-nourished. No distress. HENT:   Head: Normocephalic and atraumatic. Eyes: Pupils are equal, round, and reactive to light. Neck: Normal range of motion. Neck supple. Cardiovascular: Normal rate, regular rhythm and normal heart sounds.   Exam reveals no gallop and no friction rub.    No murmur heard. Pulmonary/Chest: Effort normal and breath sounds normal. No respiratory distress. He has no wheezes. He exhibits tenderness (anterior chest wall TTP). Abdominal: Soft. Bowel sounds are normal. He exhibits no distension. There is no tenderness. There is no rebound and no guarding. Musculoskeletal: Normal range of motion. Neurological: He is alert and oriented to person, place, and time. Skin: Skin is warm. No rash noted. He is not diaphoretic. Psychiatric: He has a normal mood and affect. His behavior is normal. Judgment and thought content normal.   Nursing note and vitals reviewed. Note written by Rosina Henning. Shaina Hall, as dictated by John Schuster MD 3:08 AM      MDM  Number of Diagnoses or Management Options  Risk of Complications, Morbidity, and/or Mortality  General comments: Total critical care time spent exclusive of procedures:  45 minutes     Critical Care  Total time providing critical care: 30-74 minutes        ED Course     This is a 48-year-old male with past medical history, review of systems, physical exam as above, presenting with complaints of chest pain. Patient states pain is epigastric, burning in nature, similar to previous MI. Patient history notable for recent N. STEMI, cardiac catheterization approximately 2 weeks ago, with revision of existing stents, and placement of new ones. Upon arrival patient awake, alert, with complaints as above, with clear breath sounds, soft nontender abdomen, regular rate and rhythm without murmurs gallops or rubs. Patient states she's never had a history of reflux disease, and pain is similar to previous presentations of acute coronary syndrome. The patient states he is compliant with his medications. Plan to provide aspirin, morphine for pain relief, obtain CMP, CBC, cardiac enzymes, coags.  Given his risk factors, we will consult cardiology, and make a disposition based the patient's diagnostics and response to therapy. Procedures     ED EKG interpretation:  Time 0258  Rhythm: normal sinus rhythm; and regular . Rate (approx.): 74 bpm; Axis: normal; ST/T wave: mild ST elevation in III and aVF; mild depression in I and aVL; otherwise unchanged from prior EKG. Note written by Noemí Hendrix. Marilyn Riley, as dictated by Jasper Donald MD 3:01 AM      CONSULT NOTE:  3:12 AM Jasper Donald MD spoke with Dr. Belen Julian, Consult for Cardiology. Discussed available diagnostic tests and clinical findings. Dr. Belen Julian recommends sending him an electronic copy of the ED EKG via [facility] Text. Will call back with further recommendations. CONSULT NOTE:  3:16 AM Jasper Donald MD spoke with Dr. Belen Julian, Consult for Cardiology. Discussed available diagnostic tests and clinical findings. Dr. Belen Julian looked at the EKG. Recommends symptomatic treatment. Would like to be updated with results. He might add patient on for a cardiac catheterization at 0700. CONSULT NOTE:  3:48 AM Jasper Donald MD spoke with Dr. Belen Julian, Consult for Cardiology. Discussed available diagnostic tests and clinical findings. Dr. Belen Julian is aware of elevated troponin. He recommends calling in the cath team for emergent cardiac catheterization.

## 2018-06-19 NOTE — PROGRESS NOTES
5529 - TRANSFER - OUT REPORT:    Verbal report given to Benny Ferguson RN(name) on [employer] [facility]  being transferred to CCU(unit) for routine progression of care       Report consisted of patients Situation, Background, Assessment and   Recommendations(SBAR). Information from the following report(s) Procedure Summary, MAR and Cardiac Rhythm nsr was reviewed with the receiving nurse. Lines:   Peripheral IV 06/19/18 Left; Inner Arm (Active)        Opportunity for questions and clarification was provided.       Patient transported with:   Monitor  Registered Nurse

## 2018-06-19 NOTE — PROGRESS NOTES
36: TRANSFER - IN REPORT:    Verbal report received from Dee RN(name) on Nino Button  being received from Cath Lab(unit) for routine progression of care      Report consisted of patients Situation, Background, Assessment and   Recommendations(SBAR). Information from the following report(s) Kardex, Procedure Summary, Intake/Output, MAR, Accordion and Recent Results was reviewed with the receiving nurse. Opportunity for questions and clarification was provided. Assessment completed upon patients arrival to unit and care assumed. Primary Nurse Eva Chapin and DONITA Fisher performed a dual skin assessment on this patient No impairment noted    Current Bed:     Total Care blue  Carlos score is 20       0730: Bedside and Verbal shift change report given to [address] [place] [address] (oncoming nurse) by Jaja Pace (offgoing nurse). Report included the following information SBAR, Kardex, Procedure Summary, Intake/Output and MAR.

## 2018-06-19 NOTE — PROCEDURES
Cardiac Catheterization Procedure Note   Patient: Alexis Herrera  MRN: [de-identified]  SSN: [id number]   YOB: 1960 Age: 60 y.o. Sex: male    Date of Procedure: 6/19/2018   Pre-procedure Diagnosis: NSTEMI  Post-procedure Diagnosis: Coronary Artery Disease  Procedure: Left Heart Cath with Bypass Grafts, PCI and to LAD  :  Dr. Juanpablo Loya MD    Assistant(s):  None  Anesthesia: Moderate Sedation   Estimated Blood Loss: Less than 10 mL   Specimens Removed: None  Findings: Open stents and no progressive disease in previously stented RCA graft and distal vasculature or in large Cx OM. Open LIMA to LAD with progressive distal 80% hazy LAD stenosis, treated with 2.25 SANTOS x 2 with SUNIL II to III. LVEF 40% with LVEDP 15.    CP resolved.   Complications: None   Implants:  None  Signed by:  Juanpablo Loya MD  6/19/2018  5:38 AM

## 2018-06-20 LAB
ANION GAP SERPL CALC-SCNC: 7 MMOL/L (ref 5–15)
ATRIAL RATE: 74 BPM
BASOPHILS # BLD: 0 K/UL (ref 0–0.1)
BASOPHILS NFR BLD: 0 % (ref 0–1)
BUN SERPL-MCNC: 17 MG/DL (ref 6–20)
BUN/CREAT SERPL: 25 (ref 12–20)
CALCIUM SERPL-MCNC: 8.5 MG/DL (ref 8.5–10.1)
CALCULATED P AXIS, ECG09: 42 DEGREES
CALCULATED R AXIS, ECG10: -25 DEGREES
CALCULATED T AXIS, ECG11: 24 DEGREES
CHLORIDE SERPL-SCNC: 108 MMOL/L (ref 97–108)
CHOLEST SERPL-MCNC: 114 MG/DL
CK MB CFR SERPL CALC: 5.3 % (ref 0–2.5)
CK MB CFR SERPL CALC: 7.6 % (ref 0–2.5)
CK MB SERPL-MCNC: 22 NG/ML (ref 5–25)
CK MB SERPL-MCNC: 39 NG/ML (ref 5–25)
CK SERPL-CCNC: 414 U/L (ref 39–308)
CK SERPL-CCNC: 515 U/L (ref 39–308)
CO2 SERPL-SCNC: 27 MMOL/L (ref 21–32)
CREAT SERPL-MCNC: 0.68 MG/DL (ref 0.7–1.3)
DIAGNOSIS, 93000: NORMAL
DIFFERENTIAL METHOD BLD: NORMAL
EOSINOPHIL # BLD: 0.4 K/UL (ref 0–0.4)
EOSINOPHIL NFR BLD: 4 % (ref 0–7)
ERYTHROCYTE [DISTWIDTH] IN BLOOD BY AUTOMATED COUNT: 13.7 % (ref 11.5–14.5)
GLUCOSE BLD STRIP.AUTO-MCNC: 160 MG/DL (ref 65–100)
GLUCOSE BLD STRIP.AUTO-MCNC: 56 MG/DL (ref 65–100)
GLUCOSE BLD STRIP.AUTO-MCNC: 61 MG/DL (ref 65–100)
GLUCOSE BLD STRIP.AUTO-MCNC: 77 MG/DL (ref 65–100)
GLUCOSE BLD STRIP.AUTO-MCNC: 90 MG/DL (ref 65–100)
GLUCOSE BLD STRIP.AUTO-MCNC: 98 MG/DL (ref 65–100)
GLUCOSE SERPL-MCNC: 71 MG/DL (ref 65–100)
HCT VFR BLD AUTO: 42.5 % (ref 36.6–50.3)
HDLC SERPL-MCNC: 30 MG/DL
HDLC SERPL: 3.8 {RATIO} (ref 0–5)
HGB BLD-MCNC: 14.1 G/DL (ref 12.1–17)
IMM GRANULOCYTES # BLD: 0 K/UL (ref 0–0.04)
IMM GRANULOCYTES NFR BLD AUTO: 0 % (ref 0–0.5)
LDLC SERPL CALC-MCNC: 43.6 MG/DL (ref 0–100)
LIPID PROFILE,FLP: ABNORMAL
LYMPHOCYTES # BLD: 2.9 K/UL (ref 0.8–3.5)
LYMPHOCYTES NFR BLD: 31 % (ref 12–49)
MCH RBC QN AUTO: 29.7 PG (ref 26–34)
MCHC RBC AUTO-ENTMCNC: 33.2 G/DL (ref 30–36.5)
MCV RBC AUTO: 89.7 FL (ref 80–99)
MONOCYTES # BLD: 0.6 K/UL (ref 0–1)
MONOCYTES NFR BLD: 6 % (ref 5–13)
NEUTS SEG # BLD: 5.5 K/UL (ref 1.8–8)
NEUTS SEG NFR BLD: 59 % (ref 32–75)
NRBC # BLD: 0 K/UL (ref 0–0.01)
NRBC BLD-RTO: 0 PER 100 WBC
P-R INTERVAL, ECG05: 196 MS
PLATELET # BLD AUTO: 219 K/UL (ref 150–400)
PMV BLD AUTO: 9.5 FL (ref 8.9–12.9)
POTASSIUM SERPL-SCNC: 3.8 MMOL/L (ref 3.5–5.1)
Q-T INTERVAL, ECG07: 416 MS
QRS DURATION, ECG06: 110 MS
QTC CALCULATION (BEZET), ECG08: 461 MS
RBC # BLD AUTO: 4.74 M/UL (ref 4.1–5.7)
SERVICE CMNT-IMP: ABNORMAL
SERVICE CMNT-IMP: NORMAL
SODIUM SERPL-SCNC: 142 MMOL/L (ref 136–145)
TRIGL SERPL-MCNC: 202 MG/DL (ref ?–150)
TROPONIN I SERPL-MCNC: 20.8 NG/ML
TROPONIN I SERPL-MCNC: 25 NG/ML
VENTRICULAR RATE, ECG03: 74 BPM
VLDLC SERPL CALC-MCNC: 40.4 MG/DL
WBC # BLD AUTO: 9.3 K/UL (ref 4.1–11.1)

## 2018-06-20 PROCEDURE — 80061 LIPID PANEL: CPT | Performed by: INTERNAL MEDICINE

## 2018-06-20 PROCEDURE — 84484 ASSAY OF TROPONIN QUANT: CPT | Performed by: INTERNAL MEDICINE

## 2018-06-20 PROCEDURE — 80048 BASIC METABOLIC PNL TOTAL CA: CPT | Performed by: INTERNAL MEDICINE

## 2018-06-20 PROCEDURE — 82962 GLUCOSE BLOOD TEST: CPT

## 2018-06-20 PROCEDURE — 82550 ASSAY OF CK (CPK): CPT | Performed by: INTERNAL MEDICINE

## 2018-06-20 PROCEDURE — 74011636637 HC RX REV CODE- 636/637: Performed by: INTERNAL MEDICINE

## 2018-06-20 PROCEDURE — 93005 ELECTROCARDIOGRAM TRACING: CPT

## 2018-06-20 PROCEDURE — 36415 COLL VENOUS BLD VENIPUNCTURE: CPT | Performed by: INTERNAL MEDICINE

## 2018-06-20 PROCEDURE — 65660000000 HC RM CCU STEPDOWN

## 2018-06-20 PROCEDURE — 85025 COMPLETE CBC W/AUTO DIFF WBC: CPT | Performed by: INTERNAL MEDICINE

## 2018-06-20 PROCEDURE — 74011250637 HC RX REV CODE- 250/637: Performed by: INTERNAL MEDICINE

## 2018-06-20 RX ORDER — ACETAMINOPHEN 325 MG/1
650 TABLET ORAL
Status: DISCONTINUED | OUTPATIENT
Start: 2018-06-20 | End: 2018-06-21 | Stop reason: HOSPADM

## 2018-06-20 RX ADMIN — Medication 10 ML: at 22:15

## 2018-06-20 RX ADMIN — INSULIN GLARGINE 43 UNITS: 100 INJECTION, SOLUTION SUBCUTANEOUS at 22:49

## 2018-06-20 RX ADMIN — FUROSEMIDE 40 MG: 40 TABLET ORAL at 08:18

## 2018-06-20 RX ADMIN — GLIPIZIDE 10 MG: 5 TABLET ORAL at 08:18

## 2018-06-20 RX ADMIN — SACUBITRIL AND VALSARTAN 1 TABLET: 24; 26 TABLET, FILM COATED ORAL at 08:18

## 2018-06-20 RX ADMIN — SACUBITRIL AND VALSARTAN 1 TABLET: 24; 26 TABLET, FILM COATED ORAL at 22:12

## 2018-06-20 RX ADMIN — INSULIN GLARGINE 42 UNITS: 100 INJECTION, SOLUTION SUBCUTANEOUS at 22:48

## 2018-06-20 RX ADMIN — NEBIVOLOL HYDROCHLORIDE 5 MG: 5 TABLET ORAL at 18:13

## 2018-06-20 RX ADMIN — Medication 8 ML: at 14:00

## 2018-06-20 RX ADMIN — PRASUGREL 10 MG: 10 TABLET, FILM COATED ORAL at 22:14

## 2018-06-20 RX ADMIN — GLIPIZIDE 10 MG: 5 TABLET ORAL at 18:12

## 2018-06-20 RX ADMIN — DULOXETINE HYDROCHLORIDE 30 MG: 30 CAPSULE, DELAYED RELEASE ORAL at 08:18

## 2018-06-20 RX ADMIN — ACETAMINOPHEN 650 MG: 325 TABLET ORAL at 22:19

## 2018-06-20 RX ADMIN — ASPIRIN 81 MG: 81 TABLET, COATED ORAL at 08:18

## 2018-06-20 NOTE — PROGRESS NOTES
CM participated in [address] rounds and noted previous CM notes. Patient to possibly discharge home tomorrow. Patient declined [facility]; will need 6 minute walk and follow up appt.      Dea Teixeira MS

## 2018-06-20 NOTE — PROGRESS NOTES
TRANSFER - IN REPORT:    Verbal report received from Scot Situ (name) on Pedro Gutierrez  being received from CCU(unit) for routine progression of care      Report consisted of patients Situation, Background, Assessment and   Recommendations(SBAR). Information from the following report(s) Procedure Summary, MAR and Cardiac Rhythm NS with PVC was reviewed with the receiving nurse. Opportunity for questions and clarification was provided. Assessment completed upon patients arrival to unit and care assumed. 80 Verified with wife that stent card placement has been given to them. Wife states that she has the card. [address] text to Dr. Katlin Orona recent CK MB and the Troponin has decreased. No further orders. Physician read text and responded, \"thanks\". Problem: Falls - Risk of  Goal: *Absence of Falls  Document Kecia Fall Risk and appropriate interventions in the flowsheet. Outcome: Progressing Towards Goal  Fall Risk Interventions:            Medication Interventions: Assess postural VS orthostatic hypotension       Problem: AMI: Day 5  Goal: Activity/Safety  Outcome: Progressing Towards Goal  Patient has increase activity and six min walk completed. Bedside and Verbal shift change report given to [facility] (oncoming nurse) by Bert Givens (offgoing nurse). Report included the following information SBAR, Kardex, Procedure Summary, MAR and Cardiac Rhythm NSR.

## 2018-06-20 NOTE — PROGRESS NOTES
Problem: Pressure Injury - Risk of  Goal: *Prevention of pressure injury  Document Carlos Scale and appropriate interventions in the flowsheet.    Outcome: Progressing Towards Goal  Pressure Injury Interventions:  Sensory Interventions: Keep linens dry and wrinkle-free

## 2018-06-20 NOTE — PROGRESS NOTES
0730 Bedside and Verbal shift change report given to Charlee Cassidy and Tomas Sofia (oncoming nurse) by Yasmany Ocampo (offgoing nurse). Report included the following information SBAR, Kardex, ED Summary, Procedure Summary, Intake/Output, MAR, Accordion and Recent Results.

## 2018-06-20 NOTE — PROGRESS NOTES
Problem: Pressure Injury - Risk of  Goal: *Prevention of pressure injury  Document Carlos Scale and appropriate interventions in the flowsheet.    Outcome: Resolved/Met Date Met: 06/20/18  Pressure Injury Interventions:  Sensory Interventions: Keep linens dry and wrinkle-free

## 2018-06-20 NOTE — PROGRESS NOTES
Problem: Falls - Risk of  Goal: *Absence of Falls  Document Kecia Fall Risk and appropriate interventions in the flowsheet.    Outcome: Progressing Towards Goal  Fall Risk Interventions:            Medication Interventions: Assess postural VS orthostatic hypotension

## 2018-06-20 NOTE — PROGRESS NOTES
Ambulated patient 350 feet in hallway with wife and RN on pulse ox. Pt denies SOB or chest pain but does describe left sternal border \"aching\", unable to determine if it's muscular or cardiac. No different symptoms or intensity with rest or walking. Advised patient to let healthcare team know when he experiences aching, wife informed as well.

## 2018-06-20 NOTE — DIABETES MGMT
DTC Progress Note    Recommendations/ Comments: Chart review for multiple episodes of hypoglycemia - FBG today 61 mg/dL. BG 56 mg/dL overnight at 0107. Yesterday before supper BG 77 mg/dL. PO intake not documented     If appropriate, please consider   Decrease Lantus to 75 units daily  Pleas document po intake    Current hospital DM medication:  Lantus 85 units at bedtime  Glipizide 10 mg BID  Lipsro normal correction scale    Chart reviewed on [facility]. Patient is a 60 y.o. male with known DM on the following at home:  Lantus 85 units at bedtime  Glipizide 10 mg BID  Farxiga 10 mg daily    A1c:   Lab Results   Component Value Date/Time    Hemoglobin A1c 7.3 (H) 05/19/2018 03:09 AM    Hemoglobin A1c 8.5 (H) 10/24/2016 03:56 AM       Recent Glucose Results:   Lab Results   Component Value Date/Time    GLU 71 06/20/2018 04:59 AM    GLUCPOC 90 06/20/2018 07:36 AM    GLUCPOC 61 (L) 06/20/2018 07:10 AM    GLUCPOC 98 06/20/2018 01:35 AM        Lab Results   Component Value Date/Time    Creatinine 0.68 (L) 06/20/2018 04:59 AM     Estimated Creatinine Clearance: 149.7 mL/min (based on Cr of 0.68). Active Orders   Diet    DIET DIABETIC CONSISTENT CARB Regular        PO intake: No data found. Will continue to follow as needed.     Thank you  Ani Kelly RN, CDE  Pager [phone]

## 2018-06-21 VITALS
DIASTOLIC BLOOD PRESSURE: 74 MMHG | HEIGHT: 73 IN | RESPIRATION RATE: 16 BRPM | BODY MASS INDEX: 30.91 KG/M2 | TEMPERATURE: 98.2 F | SYSTOLIC BLOOD PRESSURE: 130 MMHG | OXYGEN SATURATION: 94 % | WEIGHT: 233.25 LBS | HEART RATE: 72 BPM

## 2018-06-21 LAB
ATRIAL RATE: 66 BPM
CALCULATED P AXIS, ECG09: 38 DEGREES
CALCULATED R AXIS, ECG10: -19 DEGREES
CALCULATED T AXIS, ECG11: 33 DEGREES
CK MB CFR SERPL CALC: 3 % (ref 0–2.5)
CK MB SERPL-MCNC: 8.3 NG/ML (ref 5–25)
CK SERPL-CCNC: 280 U/L (ref 39–308)
DIAGNOSIS, 93000: NORMAL
GLUCOSE BLD STRIP.AUTO-MCNC: 70 MG/DL (ref 65–100)
GLUCOSE BLD STRIP.AUTO-MCNC: 81 MG/DL (ref 65–100)
P-R INTERVAL, ECG05: 202 MS
Q-T INTERVAL, ECG07: 416 MS
QRS DURATION, ECG06: 112 MS
QTC CALCULATION (BEZET), ECG08: 436 MS
SERVICE CMNT-IMP: NORMAL
SERVICE CMNT-IMP: NORMAL
TROPONIN I SERPL-MCNC: 15.4 NG/ML
VENTRICULAR RATE, ECG03: 66 BPM

## 2018-06-21 PROCEDURE — 82962 GLUCOSE BLOOD TEST: CPT

## 2018-06-21 PROCEDURE — 82550 ASSAY OF CK (CPK): CPT | Performed by: INTERNAL MEDICINE

## 2018-06-21 PROCEDURE — 74011250637 HC RX REV CODE- 250/637: Performed by: INTERNAL MEDICINE

## 2018-06-21 PROCEDURE — 84484 ASSAY OF TROPONIN QUANT: CPT | Performed by: INTERNAL MEDICINE

## 2018-06-21 PROCEDURE — 36415 COLL VENOUS BLD VENIPUNCTURE: CPT | Performed by: INTERNAL MEDICINE

## 2018-06-21 PROCEDURE — 93005 ELECTROCARDIOGRAM TRACING: CPT

## 2018-06-21 RX ADMIN — SACUBITRIL AND VALSARTAN 1 TABLET: 24; 26 TABLET, FILM COATED ORAL at 08:11

## 2018-06-21 RX ADMIN — DULOXETINE HYDROCHLORIDE 30 MG: 30 CAPSULE, DELAYED RELEASE ORAL at 08:11

## 2018-06-21 RX ADMIN — ASPIRIN 81 MG: 81 TABLET, COATED ORAL at 08:11

## 2018-06-21 RX ADMIN — GLIPIZIDE 10 MG: 5 TABLET ORAL at 08:11

## 2018-06-21 RX ADMIN — FUROSEMIDE 40 MG: 40 TABLET ORAL at 08:11

## 2018-06-21 NOTE — PROGRESS NOTES
Hospital follow-up PCP transitional care appointment has been scheduled with Dr. Sharon Brown for Tuesday, 6/26/18 at 10:40 a.m. Pending patient discharge.   Abdoul Rosado, Care Management Specialist.

## 2018-06-21 NOTE — PROGRESS NOTES
Problem: Falls - Risk of  Goal: *Absence of Falls  Document Kecia Fall Risk and appropriate interventions in the flowsheet. Outcome: Progressing Towards Goal  Fall Risk Interventions:            Medication Interventions: Assess postural VS orthostatic hypotension, Evaluate medications/consider consulting pharmacy, Teach patient to arise slowly         History of Falls Interventions: Consult care management for discharge planning, Evaluate medications/consider consulting pharmacy        Problem: Heart Failure: Day 2  Goal: Activity/Safety  Outcome: Progressing Towards Goal  Patient able to complete 3 walks around nursing unit with no complaints. Goal: Respiratory  Outcome: Progressing Towards Goal  Patient oxygen weaned down to 0ml.  Patient now on room air, with O2 sats 95-96%

## 2018-06-21 NOTE — DISCHARGE INSTRUCTIONS
[address] [facility],  [facility]    [phone]  [address]     [email or web address]    Patient Discharge Instructions    Jose R Juarez / [de-identified] : 1960    Admitted 2018 Discharged: 2018     Take Home Medications            · It is important that you take the medication exactly as they are prescribed. · Keep your medication in the bottles provided by the pharmacist and keep a list of the medication names, dosages, and times to be taken in your wallet. · Do not take other medications without consulting your doctor. BRING ALL OF YOUR MEDICINES TO YOUR OFFICE VISIT with . What to do at Home    Recommended activity: Activity as tolerated. Follow-up with Toma Cervantes MD in 2 week. Lifestyle changes  Do not smoke. Smoking increases your risk of another heart attack. If you need help quitting, talk to your doctor about stop-smoking programs and medicines. These can increase your chances of quitting for good. Eat a heart-healthy diet that is low in cholesterol, saturated fat, and salt, and is full of fruits, vegetables and whole-grains. Eat at least two servings of fish each week. You may get more details about how to eat healthy, but these tips can help you get started. Avoid colds and flu. Get a pneumococcal vaccine shot. If you have had one before, ask your doctor whether you need a second dose. Get a flu shot every fall. If you must be around people with colds or flu, wash your hands often. When should you call for help? Call 911 anytime you think you may need emergency care. For example, call if:  You have signs of a heart attack. These may include:  Chest pain or pressure. Sweating. Shortness of breath. Nausea or vomiting. Pain that spreads from the chest to the neck, jaw, or one or both shoulders or arms. Dizziness or lightheadedness. A fast or irregular pulse. After calling 911, chew 1 adult-strength aspirin. Wait for an ambulance.  Do not try to drive yourself. You passed out (lost consciousness). You feel like you are having another heart attack. Call your doctor now or seek immediate medical care if:  You have had any chest pain, even if it has gone away. You have new or increased shortness of breath. You are dizzy or lightheaded, or you feel like you may faint. Watch closely for changes in your health, and be sure to contact your doctor if you have any problem      Information obtained by :  I understand that if any problems occur once I am at home I am to contact my physician. I understand and acknowledge receipt of the instructions indicated above. R.N.'s Signature                                                                  Date/Time                                                                                                                                              Patient or Representative Signature                                                          Date/Time      Jolene Jacques MD         [place] [address]   [phone]  [place], [address] [facility]    [email or web address]

## 2018-06-21 NOTE — PROGRESS NOTES
1930: Bedside and Verbal shift change report given to Anali Kathleen RN (oncoming nurse) by Abeba Layton RN (offgoing nurse). Report included the following information SBAR, Kardex, ED Summary, Procedure Summary, MAR, Recent Results and Cardiac Rhythm NSR.     0600: HYPOGLYCEMIC EPISODE DOCUMENTATION    Patient with hypoglycemic episode(s) at 0605(time) on 6/21/18(date). BG value(s) pre-treatment 79    Was patient symptomatic? [] yes, [x] no  Patient was treated with the following rescue medications/treatments: [] D50                [] Glucose tablets                [] Glucagon                [x] 4oz juice                [] 6oz reg soda                [] 8oz low fat milk  BG value post-treatment: 81  Once BG treated and value greater than 80mg/dl, pt was provided with the following:  [x] snack  [] meal  Name of MD notified:N/A  The following orders were received: N/A    0730: Bedside and Verbal shift change report given to Dong Steven RN (oncoming nurse) by Anali Kathleen RN (offgoing nurse). Report included the following information SBAR, Kardex, Procedure Summary and Recent Results.

## 2018-06-21 NOTE — ROUTINE PROCESS
DISCHARGE SUMMARY from Nurse    PATIENT INSTRUCTIONS:    After general anesthesia or intravenous sedation, for 24 hours or while taking prescription Narcotics:  · Limit your activities  · Do not drive and operate hazardous machinery  · Do not make important personal or business decisions  · Do  not drink alcoholic beverages  · If you have not urinated within 8 hours after discharge, please contact your surgeon on call. Report the following to your surgeon:  · Excessive pain, swelling, redness or odor of or around the surgical area  · Temperature over 100.5  · Nausea and vomiting lasting longer than 4 hours or if unable to take medications  · Any signs of decreased circulation or nerve impairment to extremity: change in color, persistent  numbness, tingling, coldness or increase pain  · Any questions    What to do at Home:  Recommended activity: Activity as tolerated, see discharge instructions for further details. If you experience any of the following symptoms chest pain, heart palpitations, please follow up with Dr. Herbert Lee. *  Please give a list of your current medications to your Primary Care Provider. *  Please update this list whenever your medications are discontinued, doses are      changed, or new medications (including over-the-counter products) are added. *  Please carry medication information at all times in case of emergency situations. These are general instructions for a healthy lifestyle:    No smoking/ No tobacco products/ Avoid exposure to second hand smoke  Surgeon General's Warning:  Quitting smoking now greatly reduces serious risk to your health.     Obesity, smoking, and sedentary lifestyle greatly increases your risk for illness    A healthy diet, regular physical exercise & weight monitoring are important for maintaining a healthy lifestyle    You may be retaining fluid if you have a history of heart failure or if you experience any of the following symptoms:  Weight gain of 3 pounds or more overnight or 5 pounds in a week, increased swelling in our hands or feet or shortness of breath while lying flat in bed. Please call your doctor as soon as you notice any of these symptoms; do not wait until your next office visit. Recognize signs and symptoms of STROKE:    F-face looks uneven    A-arms unable to move or move unevenly    S-speech slurred or non-existent    T-time-call 911 as soon as signs and symptoms begin-DO NOT go       Back to bed or wait to see if you get better-TIME IS BRAIN. Warning Signs of HEART ATTACK     Call 911 if you have these symptoms:   Chest discomfort. Most heart attacks involve discomfort in the center of the chest that lasts more than a few minutes, or that goes away and comes back. It can feel like uncomfortable pressure, squeezing, fullness, or pain.  Discomfort in other areas of the upper body. Symptoms can include pain or discomfort in one or both arms, the back, neck, jaw, or stomach.  Shortness of breath with or without chest discomfort.  Other signs may include breaking out in a cold sweat, nausea, or lightheadedness. Don't wait more than five minutes to call [address]! Fast action can save your life. Calling 911 is almost always the fastest way to get lifesaving treatment. Emergency Medical Services staff can begin treatment when they arrive  up to an hour sooner than if someone gets to the hospital by car. The discharge information has been reviewed with the patient and spouse. The patient and spouse verbalized understanding. Discharge medications reviewed with the patient and spouse and appropriate educational materials and side effects teaching were provided.   ___________________________________________________________________________________________________________________________________

## 2018-06-21 NOTE — DISCHARGE SUMMARY
Cardiology Discharge Summary     Patient ID:  Andrew Turner  [id number]  68 y.o.  1960    Admit Date: 6/19/2018    Discharge Date: 6/21/2018     Admitting Physician: Rocael Beal MD     Discharge Physician: Rocael Beal MD    Admission Diagnoses: NSTEMI (non-ST elevated myocardial infarction) [facility])  NSTEMI (non-ST elevated myocardial infarction) [facility])    Discharge Diagnoses: Active Problems:    NSTEMI (non-ST elevated myocardial infarction) ([address] (5/18/2018)        Discharge Condition: Good    Cardiology Procedures this Admission:  Left heart catheterization with PCI    Hospital Course: CP with elevation of troponin and ST depression in leads 1 and AvL c/w recurring NSTEMI with wide open previous interventions at SVG to RCA system and large Cx OM. Old occlusion of SVG to intermediate. Now stented distal LAD beyond LIMA with relief of anginal pain. Enzymes trending down. EKG improved. Working on smoking cessation, weight reduction. Consults: None    Discharge Exam:     Visit Vitals    /74 (BP 1 Location: Left arm, BP Patient Position: At rest)    Pulse 72    Temp 98.2 °F (36.8 °C)    Resp 16    Ht 6' 1\" (1.854 m)    Wt 105.8 kg (233 lb 4 oz)    SpO2 94%    BMI 30.77 kg/m2     General Appearance:  Well developed, well nourished, in no acute distress. Ears/Nose/Mouth/Throat:   Hearing grossly normal.         Neck: Supple. Chest:   Lungs clear to auscultation bilaterally. Normal resp effort. Cardiovascular:  Regular rate and rhythm, S1, S2 normal, no new murmur. Abdomen:   Soft, non-tender, bowel sounds are present. Extremities: No edema bilaterally. Neuro:  Skin: A/O x 3, grossly nonfocal. Normal mood/affect. Warm and dry.                Disposition: home    Patient Instructions:   Current Discharge Medication List      CONTINUE these medications which have NOT CHANGED    Details   cholecalciferol, vitamin D3, (VITAMIN D3) 2,000 unit tab Take 2,000 Units by mouth daily.      multivits,Stress Formula-Zinc tablet Take 1 Tab by mouth daily. sacubitril-valsartan (ENTRESTO) 24 mg/26 mg tablet Take 1 Tab by mouth every twelve (12) hours. Qty: 60 Tab, Refills: 11      furosemide (LASIX) 40 mg tablet Take 1 Tab by mouth daily. Qty: 30 Tab, Refills: 11      insulin glargine (LANTUS,BASAGLAR) 100 unit/mL (3 mL) inpn 85 Units by SubCUTAneous route nightly. evolocumab (REPATHA PUSHTRONEX SC) 10 mg by SubCUTAneous route every thirty (30) days. Every 2 weeks on Sunday; next dose due 5/20/18       dapagliflozin (FARXIGA) 10 mg tab tablet Take 10 mg by mouth daily. aspirin delayed-release 81 mg tablet Take 1 Tab by mouth daily. Qty: 30 Tab, Refills: 11      prasugrel (EFFIENT) 10 mg tablet Take 1 Tab by mouth nightly. Qty: 30 Tab, Refills: 11      nebivolol (BYSTOLIC) 5 mg tablet Take 5 mg by mouth every evening. DULoxetine (CYMBALTA) 30 mg capsule Take 30 mg by mouth daily. glipiZIDE (GLUCOTROL) 10 mg tablet Take 10 mg by mouth two (2) times a day. Referenced discharge instructions provided by nursing for diet and activity. Follow-up with Dr. Anmol Mckeon in 2 weeks.      Signed:  Apryl Cristina MD  6/21/2018  8:05 AM

## 2018-06-21 NOTE — ROUTINE PROCESS
6/21/2018      RE: Mitcheal Spatz      To Whom it May Concern: This is to certify that Mitcheal Spatz may return to work on Monday June 25, 2018 . Please feel free to contact the attending provider, Dr. Michaela Guan, at [phone] if you have any questions or concerns. Thank you for your assistance in this matter.     Sincerely,        Jason Apley, RN

## 2018-06-22 ENCOUNTER — PATIENT OUTREACH (OUTPATIENT)
Dept: OTHER | Age: 58
End: 2018-06-22

## 2018-06-22 NOTE — PROGRESS NOTES
Patient on [facility] discharge report dated 6/22. Left message on voicemail. Will attempt to contact again. Need to complete post-discharge assessment.

## 2018-06-25 ENCOUNTER — PATIENT OUTREACH (OUTPATIENT)
Dept: OTHER | Age: 58
End: 2018-06-25

## 2018-06-25 NOTE — PROGRESS NOTES
Transition Of Care Note    Patient discharged from [facility] on  for NSTEMI. Medical History:     Past Medical History:   Diagnosis Date    Diabetes ([address]     Glaucoma     right eye    Hypercholesteremia     Hypertension     Kidney stones     MI, old        Care Manager contacted the patient by telephone to perform post hospital discharge assessment. Verified  and zip code with patient as identifiers. Provided introduction to self, and explanation of the Nurse Care Manager role. Medication:   New Medications at Discharge: none noted  Changed Medications at Discharge: none noted  Discontinued Medications at Discharge: none noted  Current Outpatient Prescriptions   Medication Sig    cholecalciferol, vitamin D3, (VITAMIN D3) 2,000 unit tab Take 2,000 Units by mouth daily.  multivits,Stress Formula-Zinc tablet Take 1 Tab by mouth daily.  sacubitril-valsartan (ENTRESTO) 24 mg/26 mg tablet Take 1 Tab by mouth every twelve (12) hours.  furosemide (LASIX) 40 mg tablet Take 1 Tab by mouth daily.  insulin glargine (LANTUS,BASAGLAR) 100 unit/mL (3 mL) inpn 85 Units by SubCUTAneous route nightly.  evolocumab (REPATHA PUSHTRONEX SC) 10 mg by SubCUTAneous route every thirty (30) days. Every 2 weeks on ; next dose due 18     dapagliflozin (FARXIGA) 10 mg tab tablet Take 10 mg by mouth daily.  aspirin delayed-release 81 mg tablet Take 1 Tab by mouth daily.  prasugrel (EFFIENT) 10 mg tablet Take 1 Tab by mouth nightly.  nebivolol (BYSTOLIC) 5 mg tablet Take 5 mg by mouth every evening.  DULoxetine (CYMBALTA) 30 mg capsule Take 30 mg by mouth daily.  glipiZIDE (GLUCOTROL) 10 mg tablet Take 10 mg by mouth two (2) times a day. No current facility-administered medications for this visit. There are no discontinued medications. Performed medication reconciliation with patient, and patient verbalizes understanding of administration of home medications.   There were no barriers to obtaining medications identified at this time. Inpatient RRAT score: 15  Was this a readmission? yes   Patient stated reason for the readmission: did not state    Barriers/Support system:  patient      Red Flags:  Call 911 anytime you think you may need emergency care. For example, call if:  · You have signs of a heart attack. These may include:  · Chest pain or pressure. · Sweating. · Shortness of breath. · Nausea or vomiting. · Pain that spreads from the chest to the neck, jaw, or one or both shoulders or arms. · Dizziness or lightheadedness. · A fast or irregular pulse. After calling 911, chew 1 adult-strength aspirin. Wait for an ambulance. Do not try to drive yourself. · You passed out (lost consciousness). You feel like you are having another heart attack. Discharge Instructions :  Reviewed discharge instructions with patient. Patient verbalizes understanding of discharge instructions and follow-up care. States HR, BG and weight have all been staying WNL, no increases or fluctuations. Advance Care Planning:   Patient was offered the opportunity to discuss advance care planning:  no     Does patient have an Advance Directive:  no   If no, did you provide information on Caring Connections?  no     PCP/Specialist follow up: Patient scheduled to follow up with Dr. Velasco odessa on 7/2. No future appointments. Reviewed red flags with patient, and patient verbalizes understanding. Patient given an opportunity to ask questions. No other clinical/social/functional needs noted. The patient agrees to contact the PCP office for questions related to their healthcare. The patient expressed thanks, offered no additional questions and ended the call.

## 2018-07-10 ENCOUNTER — HOSPITAL ENCOUNTER (OUTPATIENT)
Dept: CT IMAGING | Age: 58
Discharge: HOME | End: 2018-07-10
Attending: INTERNAL MEDICINE

## 2018-07-10 DIAGNOSIS — Z72.0 TOBACCO USE: ICD-10-CM

## 2018-07-11 ENCOUNTER — PATIENT OUTREACH (OUTPATIENT)
Dept: OTHER | Age: 58
End: 2018-07-11

## 2018-08-03 ENCOUNTER — PATIENT OUTREACH (OUTPATIENT)
Dept: OTHER | Age: 58
End: 2018-08-03

## 2018-08-03 NOTE — LETTER
8/3/2018 2:58 PM 
 
Mr. Johny Singh [id number] [facility] [address] Dear Phillip Alyx Hill, My name is Hollis Thakur , Employee Care Manager for [employer], and I have been trying to reach you. The Employee Care Management program is a free-of-charge, confidential service provided to our employees and their family members covered by the [employer]. Part of my job is to follow up with members who have recently been in the hospital or emergency room, to help them coordinate their care and answer questions they may have about their visit. As healthcare providers, we know that patients do better when they have close follow up with a primary care provider (PCP), especially after a hospital or emergency department visit. If you do not have a PCP, I can help you find one that is convenient to you and covered by your insurance. I can also help you understand any after visit instructions, such as what symptoms to watch out for, or any new or changed medications. Remember that you can access your After Visit Summary by logging into your [employer] account. If you do not have a [employer] account, I can help you request access. Our program is designed to provide you with the opportunity to have a [employer] care manager partner with you for your healthcare needs. Please contact me at the below number if I can provide you with assistance for any of the above services. Sincerely, Hollis Thakur RN, BSN  Employee Care Manager [address] [place] [address], [address] [place], [place] [id number] [facility] Cell [phone] Fax [email or web address] [email or web address]

## 2019-02-20 ENCOUNTER — HOSPITAL ENCOUNTER (INPATIENT)
Age: 59
LOS: 2 days | Discharge: HOME | DRG: 247 | End: 2019-02-22
Attending: EMERGENCY MEDICINE | Admitting: INTERNAL MEDICINE
Payer: COMMERCIAL

## 2019-02-20 DIAGNOSIS — I21.3 ST ELEVATION MYOCARDIAL INFARCTION (STEMI), UNSPECIFIED ARTERY (HCC): Primary | ICD-10-CM

## 2019-02-20 DIAGNOSIS — I21.3 ST ELEVATION (STEMI) MYOCARDIAL INFARCTION (HCC): ICD-10-CM

## 2019-02-20 PROCEDURE — C1887 CATHETER, GUIDING: HCPCS | Performed by: INTERNAL MEDICINE

## 2019-02-20 PROCEDURE — 74011636320 HC RX REV CODE- 636/320: Performed by: INTERNAL MEDICINE

## 2019-02-20 PROCEDURE — 99153 MOD SED SAME PHYS/QHP EA: CPT | Performed by: INTERNAL MEDICINE

## 2019-02-20 PROCEDURE — 74011250636 HC RX REV CODE- 250/636: Performed by: INTERNAL MEDICINE

## 2019-02-20 PROCEDURE — 74011250637 HC RX REV CODE- 250/637: Performed by: INTERNAL MEDICINE

## 2019-02-20 PROCEDURE — 77030013744: Performed by: INTERNAL MEDICINE

## 2019-02-20 PROCEDURE — C1894 INTRO/SHEATH, NON-LASER: HCPCS | Performed by: INTERNAL MEDICINE

## 2019-02-20 PROCEDURE — C1769 GUIDE WIRE: HCPCS | Performed by: INTERNAL MEDICINE

## 2019-02-20 PROCEDURE — 77030004533 HC CATH ANGI DX IMP BSC -B: Performed by: INTERNAL MEDICINE

## 2019-02-20 PROCEDURE — C1757 CATH, THROMBECTOMY/EMBOLECT: HCPCS | Performed by: INTERNAL MEDICINE

## 2019-02-20 PROCEDURE — 75810000275 HC EMERGENCY DEPT VISIT NO LEVEL OF CARE

## 2019-02-20 PROCEDURE — 77030013715 HC INFL SYS MRTM -B: Performed by: INTERNAL MEDICINE

## 2019-02-20 PROCEDURE — C1725 CATH, TRANSLUMIN NON-LASER: HCPCS | Performed by: INTERNAL MEDICINE

## 2019-02-20 PROCEDURE — 92941 PRQ TRLML REVSC TOT OCCL AMI: CPT | Performed by: INTERNAL MEDICINE

## 2019-02-20 PROCEDURE — C1874 STENT, COATED/COV W/DEL SYS: HCPCS | Performed by: INTERNAL MEDICINE

## 2019-02-20 PROCEDURE — 99152 MOD SED SAME PHYS/QHP 5/>YRS: CPT | Performed by: INTERNAL MEDICINE

## 2019-02-20 PROCEDURE — 65620000000 HC RM CCU GENERAL

## 2019-02-20 PROCEDURE — 74011250636 HC RX REV CODE- 250/636

## 2019-02-20 PROCEDURE — 93455 CORONARY ART/GRFT ANGIO S&I: CPT | Performed by: INTERNAL MEDICINE

## 2019-02-20 PROCEDURE — C1760 CLOSURE DEV, VASC: HCPCS | Performed by: INTERNAL MEDICINE

## 2019-02-20 PROCEDURE — 74011000258 HC RX REV CODE- 258: Performed by: INTERNAL MEDICINE

## 2019-02-20 DEVICE — STENT RSINT40012UX MICROTRAC 4.00X12RX
Type: IMPLANTABLE DEVICE | Status: FUNCTIONAL
Brand: RESOLUTE INTEGRITY™

## 2019-02-20 DEVICE — IMPLANTABLE DEVICE: Type: IMPLANTABLE DEVICE | Status: FUNCTIONAL

## 2019-02-20 RX ORDER — FENTANYL CITRATE 50 UG/ML
INJECTION, SOLUTION INTRAMUSCULAR; INTRAVENOUS AS NEEDED
Status: DISCONTINUED | OUTPATIENT
Start: 2019-02-20 | End: 2019-02-21 | Stop reason: HOSPADM

## 2019-02-20 RX ORDER — SODIUM CHLORIDE 9 MG/ML
INJECTION, SOLUTION INTRAVENOUS
Status: DISCONTINUED | OUTPATIENT
Start: 2019-02-20 | End: 2019-02-21 | Stop reason: HOSPADM

## 2019-02-20 RX ORDER — ATROPINE SULFATE 0.1 MG/ML
INJECTION INTRAVENOUS AS NEEDED
Status: DISCONTINUED | OUTPATIENT
Start: 2019-02-20 | End: 2019-02-21 | Stop reason: HOSPADM

## 2019-02-20 RX ORDER — HEPARIN SODIUM 200 [USP'U]/100ML
INJECTION, SOLUTION INTRAVENOUS
Status: COMPLETED | OUTPATIENT
Start: 2019-02-20 | End: 2019-02-20

## 2019-02-20 RX ORDER — LIDOCAINE HYDROCHLORIDE 10 MG/ML
INJECTION INFILTRATION; PERINEURAL AS NEEDED
Status: DISCONTINUED | OUTPATIENT
Start: 2019-02-20 | End: 2019-02-21 | Stop reason: HOSPADM

## 2019-02-20 RX ORDER — SODIUM CHLORIDE 9 MG/ML
INJECTION, SOLUTION INTRAVENOUS
Status: COMPLETED | OUTPATIENT
Start: 2019-02-20 | End: 2019-02-20

## 2019-02-20 RX ORDER — MIDAZOLAM HYDROCHLORIDE 1 MG/ML
INJECTION, SOLUTION INTRAMUSCULAR; INTRAVENOUS AS NEEDED
Status: DISCONTINUED | OUTPATIENT
Start: 2019-02-20 | End: 2019-02-21 | Stop reason: HOSPADM

## 2019-02-20 RX ORDER — PHENYLEPHRINE HCL IN 0.9% NACL 1 MG/10 ML
SYRINGE (ML) INTRAVENOUS AS NEEDED
Status: DISCONTINUED | OUTPATIENT
Start: 2019-02-20 | End: 2019-02-21 | Stop reason: HOSPADM

## 2019-02-20 RX ORDER — PRASUGREL 10 MG/1
TABLET, FILM COATED ORAL AS NEEDED
Status: DISCONTINUED | OUTPATIENT
Start: 2019-02-20 | End: 2019-02-21 | Stop reason: HOSPADM

## 2019-02-20 RX ADMIN — SODIUM CHLORIDE 250 ML: 9 INJECTION, SOLUTION INTRAVENOUS at 23:09

## 2019-02-20 NOTE — Clinical Note
Lesion: Located in the Mid SVG. Stent inserted. Stent deployed. Single technique used. First inflation pressure = 14 adriel; inflation time: 30 sec.

## 2019-02-20 NOTE — Clinical Note
Lesion: Located in the Proximal SVG. Stent inserted. Stent deployed. First inflation pressure = 14 adriel; inflation time: 30 sec.

## 2019-02-20 NOTE — Clinical Note
Multiple views of the internal mammary artery to the left anterior descending obtained using power injection.

## 2019-02-20 NOTE — Clinical Note
TRANSFER - OUT REPORT:  
 
Verbal report given to: lenora. Report consisted of patient's Situation, Background, Assessment and  
Recommendations(SBAR). Opportunity for questions and clarification was provided. Patient transported with a Registered Nurse. Patient transported to: ccu.

## 2019-02-20 NOTE — Clinical Note
Lesion located in the Mid SVG. Balloon inserted. Balloon inflated using multiple inflations inflation technique. Pressure = 8 adriel; Duration = 14 sec. Inflation 2: Pressure: 10 adriel; Duration: 12 sec.

## 2019-02-20 NOTE — Clinical Note
Lesion located in the Proximal SVG. Balloon balloon re-inflated. Pressure = 8 adriel; Duration = 16 sec.

## 2019-02-20 NOTE — Clinical Note
Allergies reviewed, pre-procedure education provided and patient verbalized understanding of procedure.

## 2019-02-21 ENCOUNTER — APPOINTMENT (OUTPATIENT)
Dept: NON INVASIVE DIAGNOSTICS | Age: 59
DRG: 247 | End: 2019-02-21
Attending: INTERNAL MEDICINE
Payer: COMMERCIAL

## 2019-02-21 PROBLEM — I21.19 ACUTE ST ELEVATION MYOCARDIAL INFARCTION (STEMI) OF INFERIOR WALL (HCC): Status: ACTIVE | Noted: 2019-02-21

## 2019-02-21 LAB
ANION GAP SERPL CALC-SCNC: 7 MMOL/L (ref 5–15)
ATRIAL RATE: 60 BPM
BUN SERPL-MCNC: 18 MG/DL (ref 6–20)
BUN/CREAT SERPL: 24 (ref 12–20)
CALCIUM SERPL-MCNC: 8.2 MG/DL (ref 8.5–10.1)
CALCULATED P AXIS, ECG09: 48 DEGREES
CALCULATED R AXIS, ECG10: -22 DEGREES
CALCULATED T AXIS, ECG11: 27 DEGREES
CHLORIDE SERPL-SCNC: 112 MMOL/L (ref 97–108)
CHOLEST SERPL-MCNC: 66 MG/DL
CO2 SERPL-SCNC: 21 MMOL/L (ref 21–32)
CREAT SERPL-MCNC: 0.76 MG/DL (ref 0.7–1.3)
DIAGNOSIS, 93000: NORMAL
GLUCOSE BLD STRIP.AUTO-MCNC: 131 MG/DL (ref 65–100)
GLUCOSE BLD STRIP.AUTO-MCNC: 136 MG/DL (ref 65–100)
GLUCOSE BLD STRIP.AUTO-MCNC: 164 MG/DL (ref 65–100)
GLUCOSE SERPL-MCNC: 133 MG/DL (ref 65–100)
HDLC SERPL-MCNC: 29 MG/DL
HDLC SERPL: 2.3 {RATIO} (ref 0–5)
LDLC SERPL CALC-MCNC: ABNORMAL MG/DL (ref 0–100)
LIPID PROFILE,FLP: ABNORMAL
MAGNESIUM SERPL-MCNC: 2.1 MG/DL (ref 1.6–2.4)
P-R INTERVAL, ECG05: 202 MS
POTASSIUM SERPL-SCNC: 3.7 MMOL/L (ref 3.5–5.1)
Q-T INTERVAL, ECG07: 452 MS
QRS DURATION, ECG06: 110 MS
QTC CALCULATION (BEZET), ECG08: 452 MS
SERVICE CMNT-IMP: ABNORMAL
SODIUM SERPL-SCNC: 140 MMOL/L (ref 136–145)
TRIGL SERPL-MCNC: 253 MG/DL (ref ?–150)
TROPONIN I SERPL-MCNC: 3.91 NG/ML
VENTRICULAR RATE, ECG03: 60 BPM
VLDLC SERPL CALC-MCNC: ABNORMAL MG/DL

## 2019-02-21 PROCEDURE — 74011250637 HC RX REV CODE- 250/637: Performed by: INTERNAL MEDICINE

## 2019-02-21 PROCEDURE — B2121ZZ FLUOROSCOPY OF SINGLE CORONARY ARTERY BYPASS GRAFT USING LOW OSMOLAR CONTRAST: ICD-10-PCS | Performed by: INTERNAL MEDICINE

## 2019-02-21 PROCEDURE — 027035Z DILATION OF CORONARY ARTERY, ONE ARTERY WITH TWO DRUG-ELUTING INTRALUMINAL DEVICES, PERCUTANEOUS APPROACH: ICD-10-PCS | Performed by: INTERNAL MEDICINE

## 2019-02-21 PROCEDURE — C8929 TTE W OR WO FOL WCON,DOPPLER: HCPCS

## 2019-02-21 PROCEDURE — 80048 BASIC METABOLIC PNL TOTAL CA: CPT

## 2019-02-21 PROCEDURE — 93005 ELECTROCARDIOGRAM TRACING: CPT

## 2019-02-21 PROCEDURE — 74011000250 HC RX REV CODE- 250: Performed by: INTERNAL MEDICINE

## 2019-02-21 PROCEDURE — 80061 LIPID PANEL: CPT

## 2019-02-21 PROCEDURE — 65660000000 HC RM CCU STEPDOWN

## 2019-02-21 PROCEDURE — 36415 COLL VENOUS BLD VENIPUNCTURE: CPT

## 2019-02-21 PROCEDURE — B2181ZZ FLUOROSCOPY OF LEFT INTERNAL MAMMARY BYPASS GRAFT USING LOW OSMOLAR CONTRAST: ICD-10-PCS | Performed by: INTERNAL MEDICINE

## 2019-02-21 PROCEDURE — 74011250636 HC RX REV CODE- 250/636

## 2019-02-21 PROCEDURE — 83735 ASSAY OF MAGNESIUM: CPT

## 2019-02-21 PROCEDURE — 02C03ZZ EXTIRPATION OF MATTER FROM CORONARY ARTERY, ONE ARTERY, PERCUTANEOUS APPROACH: ICD-10-PCS | Performed by: INTERNAL MEDICINE

## 2019-02-21 PROCEDURE — 77010033678 HC OXYGEN DAILY

## 2019-02-21 PROCEDURE — 84484 ASSAY OF TROPONIN QUANT: CPT

## 2019-02-21 PROCEDURE — B2111ZZ FLUOROSCOPY OF MULTIPLE CORONARY ARTERIES USING LOW OSMOLAR CONTRAST: ICD-10-PCS | Performed by: INTERNAL MEDICINE

## 2019-02-21 PROCEDURE — 82962 GLUCOSE BLOOD TEST: CPT

## 2019-02-21 PROCEDURE — 74011636637 HC RX REV CODE- 636/637: Performed by: INTERNAL MEDICINE

## 2019-02-21 PROCEDURE — 74011250636 HC RX REV CODE- 250/636: Performed by: INTERNAL MEDICINE

## 2019-02-21 PROCEDURE — 4A023N7 MEASUREMENT OF CARDIAC SAMPLING AND PRESSURE, LEFT HEART, PERCUTANEOUS APPROACH: ICD-10-PCS | Performed by: INTERNAL MEDICINE

## 2019-02-21 RX ORDER — INSULIN GLARGINE 100 [IU]/ML
85 INJECTION, SOLUTION SUBCUTANEOUS
Status: DISCONTINUED | OUTPATIENT
Start: 2019-02-21 | End: 2019-02-22 | Stop reason: HOSPADM

## 2019-02-21 RX ORDER — PRASUGREL 10 MG/1
10 TABLET, FILM COATED ORAL DAILY
Status: DISCONTINUED | OUTPATIENT
Start: 2019-02-21 | End: 2019-02-22 | Stop reason: HOSPADM

## 2019-02-21 RX ORDER — GLIPIZIDE 5 MG/1
10 TABLET, FILM COATED, EXTENDED RELEASE ORAL
Status: DISCONTINUED | OUTPATIENT
Start: 2019-02-21 | End: 2019-02-22 | Stop reason: HOSPADM

## 2019-02-21 RX ORDER — SODIUM CHLORIDE 0.9 % (FLUSH) 0.9 %
5-40 SYRINGE (ML) INJECTION AS NEEDED
Status: DISCONTINUED | OUTPATIENT
Start: 2019-02-21 | End: 2019-02-21

## 2019-02-21 RX ORDER — DULOXETIN HYDROCHLORIDE 30 MG/1
30 CAPSULE, DELAYED RELEASE ORAL DAILY
Status: DISCONTINUED | OUTPATIENT
Start: 2019-02-21 | End: 2019-02-22 | Stop reason: HOSPADM

## 2019-02-21 RX ORDER — GUAIFENESIN 100 MG/5ML
81 LIQUID (ML) ORAL DAILY
Status: DISCONTINUED | OUTPATIENT
Start: 2019-02-21 | End: 2019-02-22 | Stop reason: HOSPADM

## 2019-02-21 RX ORDER — ACETAMINOPHEN 325 MG/1
650 TABLET ORAL
Status: DISCONTINUED | OUTPATIENT
Start: 2019-02-21 | End: 2019-02-22 | Stop reason: HOSPADM

## 2019-02-21 RX ORDER — ONDANSETRON 2 MG/ML
4 INJECTION INTRAMUSCULAR; INTRAVENOUS
Status: DISCONTINUED | OUTPATIENT
Start: 2019-02-21 | End: 2019-02-22 | Stop reason: HOSPADM

## 2019-02-21 RX ORDER — SODIUM CHLORIDE 0.9 % (FLUSH) 0.9 %
20 SYRINGE (ML) INJECTION
Status: ACTIVE | OUTPATIENT
Start: 2019-02-21 | End: 2019-02-21

## 2019-02-21 RX ORDER — SODIUM CHLORIDE 0.9 % (FLUSH) 0.9 %
5-40 SYRINGE (ML) INJECTION EVERY 8 HOURS
Status: DISCONTINUED | OUTPATIENT
Start: 2019-02-21 | End: 2019-02-22 | Stop reason: HOSPADM

## 2019-02-21 RX ORDER — FUROSEMIDE 40 MG/1
40 TABLET ORAL DAILY
Status: DISCONTINUED | OUTPATIENT
Start: 2019-02-21 | End: 2019-02-22 | Stop reason: HOSPADM

## 2019-02-21 RX ORDER — NEBIVOLOL 5 MG/1
5 TABLET ORAL EVERY EVENING
Status: DISCONTINUED | OUTPATIENT
Start: 2019-02-21 | End: 2019-02-22 | Stop reason: HOSPADM

## 2019-02-21 RX ADMIN — ACETAMINOPHEN 650 MG: 325 TABLET ORAL at 13:49

## 2019-02-21 RX ADMIN — Medication 10 ML: at 01:35

## 2019-02-21 RX ADMIN — INSULIN GLARGINE 85 UNITS: 100 INJECTION, SOLUTION SUBCUTANEOUS at 21:51

## 2019-02-21 RX ADMIN — SACUBITRIL AND VALSARTAN 1 TABLET: 24; 26 TABLET, FILM COATED ORAL at 22:11

## 2019-02-21 RX ADMIN — DULOXETINE 30 MG: 30 CAPSULE, DELAYED RELEASE ORAL at 08:32

## 2019-02-21 RX ADMIN — SACUBITRIL AND VALSARTAN 1 TABLET: 24; 26 TABLET, FILM COATED ORAL at 08:32

## 2019-02-21 RX ADMIN — Medication 10 ML: at 21:51

## 2019-02-21 RX ADMIN — NEBIVOLOL HYDROCHLORIDE 5 MG: 5 TABLET ORAL at 17:18

## 2019-02-21 RX ADMIN — FUROSEMIDE 40 MG: 40 TABLET ORAL at 08:32

## 2019-02-21 RX ADMIN — SACUBITRIL AND VALSARTAN 1 TABLET: 24; 26 TABLET, FILM COATED ORAL at 02:41

## 2019-02-21 RX ADMIN — PRASUGREL 10 MG: 10 TABLET, FILM COATED ORAL at 08:32

## 2019-02-21 RX ADMIN — Medication 10 ML: at 06:11

## 2019-02-21 RX ADMIN — ASPIRIN 81 MG CHEWABLE TABLET 81 MG: 81 TABLET CHEWABLE at 08:32

## 2019-02-21 RX ADMIN — SODIUM CHLORIDE 1.5 ML: 9 INJECTION INTRAMUSCULAR; INTRAVENOUS; SUBCUTANEOUS at 12:27

## 2019-02-21 RX ADMIN — GLIPIZIDE 10 MG: 5 TABLET, FILM COATED, EXTENDED RELEASE ORAL at 17:18

## 2019-02-21 RX ADMIN — ONDANSETRON 4 MG: 2 INJECTION INTRAMUSCULAR; INTRAVENOUS at 15:13

## 2019-02-21 RX ADMIN — Medication 10 ML: at 15:13

## 2019-02-21 RX ADMIN — GLIPIZIDE 10 MG: 5 TABLET, FILM COATED, EXTENDED RELEASE ORAL at 09:59

## 2019-02-21 NOTE — CARDIO/PULMONARY
Cardiac Rehab: Per EMR, patient is a current smoker.  Smoking Cessation Program information placed on the AVS.   
Mary Minor RN

## 2019-02-21 NOTE — PROGRESS NOTES
0100: TRANSFER - OUT REPORT: 
 
Verbal report given to DONITA Fisher (name) on Hilda Birch  being transferred to CCU (unit) for routine post - op Report consisted of patients Situation, Background, Assessment and  
Recommendations(SBAR). Information from the following report(s) SBAR, Procedure Summary and MAR was reviewed with the receiving nurse. Lines:  
Peripheral IV 02/20/19 Right Antecubital (Active) Peripheral IV 02/20/19 Left Forearm (Active) Opportunity for questions and clarification was provided. Patient transported with: 
 Monitor O2 @ 2 liters Registered Nurse Tech

## 2019-02-21 NOTE — PROGRESS NOTES
Problem: Falls - Risk of 
Goal: *Absence of Falls Document Orvel Buffy Fall Risk and appropriate interventions in the flowsheet. Outcome: Progressing Towards Goal 
Fall Risk Interventions: 
  
 
  
 
Medication Interventions: Patient to call before getting OOB, Teach patient to arise slowly Elimination Interventions: Call light in reach, Toilet paper/wipes in reach, Toileting schedule/hourly rounds, Urinal in reach, Patient to call for help with toileting needs Problem: Pressure Injury - Risk of 
Goal: *Prevention of pressure injury Document Carlos Scale and appropriate interventions in the flowsheet. Outcome: Progressing Towards Goal 
Pressure Injury Interventions: Activity Interventions: Increase time out of bed, Pressure redistribution bed/mattress(bed type) Mobility Interventions: HOB 30 degrees or less, Pressure redistribution bed/mattress (bed type), PT/OT evaluation Nutrition Interventions: Discuss nutritional consult with provider Friction and Shear Interventions: Feet elevated on foot rest

## 2019-02-21 NOTE — INTERDISCIPLINARY ROUNDS
IDR/SLIDR Summary Patient: Stefany Escobedo MRN: [de-identified]    Age: 60 y.o. YOB: 1960 Room/Bed: [address] [place] [address] Admit Diagnosis: STEMI (ST elevation myocardial infarction) ([facility] [I21.3] Acute ST elevation myocardial infarction (STEMI) of inferior wall (HCC) [I21.19]  Principal Diagnosis: STEMI (ST elevation myocardial infarction) ([facility] Goals: stable HR Readmission: NO  Quality Measure: SCIP and AMI 
VTE Prophylaxis: Not needed Influenza Vaccine screening completed? YES Pneumococcal Vaccine screening completed? NO Mobility needs: No   Nutrition plan:Yes 
Consults:Case Management Financial concerns:Yes  Escalated to CM? YES 
RRAT Score: 11   Interventions:H2H Testing due for pt today? YES 
LOS: 1 days Expected length of stay ? days Discharge plan: tbd   PCP: Luis Singh NP Transportation needs: No   
Days before discharge:two or more days before discharge Discharge disposition: Home Signed:  
 
Sendy Castillo RN 
2/21/2019 
7:46 AM

## 2019-02-21 NOTE — PROGRESS NOTES
TRANSFER - IN REPORT: 
 
Verbal report received from [facility]) on Vlad Custard  being received from CCU(unit) for routine progression of care Report consisted of patients Situation, Background, Assessment and  
Recommendations(SBAR). Information from the following report(s) SBAR, Kardex, ED Summary, Procedure Summary, Intake/Output, Recent Results and Cardiac Rhythm NSR 1st degree was reviewed with the receiving nurse. Opportunity for questions and clarification was provided. Assessment completed upon patients arrival to unit and care assumed. 1736: Pt put on tele. Vitals/assessment done. Bedside and Verbal shift change report given to Roselia Bullock (oncoming nurse) by Emmanuelle Coats RN (offgoing nurse). Report included the following information SBAR, Kardex, ED Summary, Procedure Summary, Recent Results and Cardiac Rhythm NSR 1st degree.

## 2019-02-21 NOTE — PROGRESS NOTES
Cardiac Cath Lab Procedure Area Arrival Note: 
 
Sherryl Cogan arrived to Cardiac Cath Lab, Procedure Area. Patient identifiers verified with NAME and DATE OF BIRTH. Procedure verified with patient. Consent forms verified. Allergies verified. Patient informed of procedure and plan of care. Questions answered with review. Patient voiced understanding of procedure and plan of care. Patient on cardiac monitor, non-invasive blood pressure, SPO2 monitor. On RA and placed on or O2 @ 2 lpm via NC.  IV of NSS on pump at 326 ml/hr per VICKY protocol. Patient status doing well without problems. Patient is A&Ox 4. Patient reports 1/10 chest pain. Patient medicated during procedure with orders obtained and verified by Dr. Roxie Day. Refer to patients Cardiac Cath Lab PROCEDURE REPORT for vital signs, assessment, status, and response during procedure, printed at end of case. Printed report on chart or scanned into chart.

## 2019-02-21 NOTE — H&P
S Cardiology Consult Note Date of consult:  02/20/19 Date of admission: 2/20/2019 Primary Cardiologist: Anna Allen Physician Requesting consult: Dr Estefany Little / Reason for consult: STEMI History of the presenting illness: 
Jose R Juarez is a 60 y.o. with a h/o prior MI x 6 coming in with sudden onset chest pain starting around 6pm. 
Had some recent URI symptoms EMS was called - EKG by EMS showed inferior STEMI Taken emergently to cath lab Has h/o CAD s/p CABG. Last intervention was in June 2016 He sounds like he is fairly non-compliant. Tells me he's only been taking aspirin lately. None of his usual meds Says he can't take statins. Not taking Effient. Past Medical History:  
Diagnosis Date  Diabetes ([facility]  Glaucoma   
 right eye  Hypercholesteremia  Hypertension  Kidney stones  MI, old Prior to Admission medications Medication Sig Start Date End Date Taking? Authorizing Provider  
cholecalciferol, vitamin D3, (VITAMIN D3) 2,000 unit tab Take 2,000 Units by mouth daily. Provider, Historical  
multivits,Stress Formula-Zinc tablet Take 1 Tab by mouth daily. Provider, Historical  
sacubitril-valsartan (ENTRESTO) 24 mg/26 mg tablet Take 1 Tab by mouth every twelve (12) hours. 6/7/18   Jane Sauh MD  
furosemide (LASIX) 40 mg tablet Take 1 Tab by mouth daily. 5/23/18   Jane Sahu MD  
insulin glargine (LANTUS,BASAGLAR) 100 unit/mL (3 mL) inpn 85 Units by SubCUTAneous route nightly. Provider, Historical  
evolocumab (REPATHA PUSHTRONEX SC) 10 mg by SubCUTAneous route every thirty (30) days. Every 2 weeks on Sunday; next dose due 5/20/18     Provider, Historical  
dapagliflozin (FARXIGA) 10 mg tab tablet Take 10 mg by mouth daily. Provider, Historical  
aspirin delayed-release 81 mg tablet Take 1 Tab by mouth daily. 10/25/16   Jane Sahu MD  
prasugrel (EFFIENT) 10 mg tablet Take 1 Tab by mouth nightly.  10/25/16 Rogelio Houser MD  
nebivolol (BYSTOLIC) 5 mg tablet Take 5 mg by mouth every evening. Ilene Catherine MD  
DULoxetine (CYMBALTA) 30 mg capsule Take 30 mg by mouth daily. Ilene Catherine MD  
glipiZIDE (GLUCOTROL) 10 mg tablet Take 10 mg by mouth two (2) times a day. Ilene Catherine MD  
 
 
No family history on file. Non-contributory. Social History Socioeconomic History  Marital status:  Spouse name: Not on file  Number of children: Not on file  Years of education: Not on file  Highest education level: Not on file Social Needs  Financial resource strain: Not on file  Food insecurity - worry: Not on file  Food insecurity - inability: Not on file  Transportation needs - medical: Not on file  Transportation needs - non-medical: Not on file Occupational History  Not on file Tobacco Use  Smoking status: Current Every Day Smoker Substance and Sexual Activity  Alcohol use: Yes  Drug use: No  
 Sexual activity: Not on file Other Topics Concern  Not on file Social History Narrative  Not on file Review of Systems Unable to perform ROS: Critical illness Visit Vitals /63 (BP 1 Location: Left arm, BP Patient Position: At rest;Supine) Pulse 90 Resp 22 Ht 6' 1\" (1.854 m) Wt 108.9 kg (240 lb) SpO2 94% BMI 31.66 kg/m² Physical Exam  
Constitutional: He is oriented to person, place, and time and well-developed, well-nourished, and in no distress. HENT:  
Head: Normocephalic and atraumatic. Eyes: Conjunctivae are normal. No scleral icterus. Neck: No JVD present. Cardiovascular: Normal rate, regular rhythm and normal heart sounds. Exam reveals no gallop. No murmur heard. Pulmonary/Chest: Effort normal and breath sounds normal. No stridor. No respiratory distress. He has no wheezes. Abdominal: Soft. He exhibits no distension. Musculoskeletal: Normal range of motion. He exhibits no deformity. Neurological: He is alert and oriented to person, place, and time. Skin: Skin is warm and dry. Psychiatric: Mood and affect normal.  
 
 
Lab review: 
BMP:  
No results found for: NA, K, CL, CO2, AGAP, GLU, BUN, CREA, GFRAA, GFRNA  
 
CBC: 
Lab Results Component Value Date/Time WBC 9.3 06/20/2018 04:59 AM  
 HGB 14.1 06/20/2018 04:59 AM  
 HCT 42.5 06/20/2018 04:59 AM  
 PLATELET 139 15/00/6206 04:59 AM  
 MCV 89.7 06/20/2018 04:59 AM  
 
 
All Cardiac Markers in the last 24 hours:  No results found for: CPK, CK, CKMMB, CKMB, RCK3, CKMBT, CKMBPOC, CKNDX, CKND1, JUDE, TROPT, TROIQ, TWYLA, TROPT, TNIPOC, BNP, BNPP, BNPNT Data review: 
EKG tracing personally reviewed: inferior STEMI Assessment: 
 
1. Inferior STEMI 2. CAD with h/o CABG. Presenting w ACS as above 3. Diabetes type II on insulin 4. HTN 
5. Non-compliance Recommendations / Plan: 
 
Primary PCI of SVG to RCA performed Aspirated with Brewster catheter SANOTS x 2 in the graft (prox and mid) Angiomax for an hour ASA and Effient Echo in the morning Yuliyane Louder to see in AM 
 
Signed: 
Keo Brian [facility] 02/20/19

## 2019-02-21 NOTE — PROGRESS NOTES
Spiritual Care Assessment/Progress Note [address] 
 
 
NAME: Sloan Pepper      MRN: [de-identified] AGE: 60 y.o. SEX: male Nondenominational Affiliation: Sikh  
Language: English  
 
2/21/2019     Total Time (in minutes): 80  
 
Spiritual Assessment begun in [address], [facility] [facility] TEE through conversation with: 
  
    []Patient        [x] Family    [] Friend(s) Reason for Consult: Crisis, Wilhelmena Lemus Spiritual beliefs: (Please include comment if needed) [x] Identifies with a jovi tradition:     
   [x] Supported by a jovi community:        
   [] Claims no spiritual orientation:       
   [] Seeking spiritual identity:            
   [] Adheres to an individual form of spirituality:       
   [] Not able to assess:                   
 
    
Identified resources for coping:  
   [x] Prayer                           
   [] Music                  [] Guided Imagery [x] Family/friends                 [] Pet visits [] Devotional reading                         [] Unknown 
   [] Other:                                          
 
 
Interventions offered during this visit: (See comments for more details) Family/Friend(s): Affirmation of emotions/emotional suffering, Affirmation of jovi, Iconic (affirming the presence of God/Higher Power), Normalization of emotional/spiritual concerns, Prayer (actual), Prayer (assurance of), Nondenominational beliefs/image of God discussed Plan of Care: 
 
 [x] Support spiritual and/or cultural needs  
 [] Support AMD and/or advance care planning process    
 [] Support grieving process 
 [] Coordinate Rites and/or Rituals  
 [] Coordination with community clergy [] No spiritual needs identified at this time 
 [] Detailed Plan of Care below (See Comments)  [] Make referral to Music Therapy 
[] Make referral to Pet Therapy    
[] Make referral to Addiction services 
[] Make referral to [facility] 
[] Make referral to Spiritual Care Partner [] No future visits requested       
[x] Follow up visits as needed Comments: Responded to [facility] in ER. Consulted with nurse. Pt taken to Cath Lab.  met mother and daughter in ED waiting area.  escorted family down to Cath Lab. Provided Supportive listening as patient/family shared about numerous visits to [facility] because of pt condition.  provided support while pt was in Cath lab. Prayed and provided support to family. Advised of  Availability. Chaplains will follow as able and/or needed. Abran Oakes,  Intern, MDiv 
[phone]

## 2019-02-21 NOTE — CARDIO/PULMONARY
Cardiac Rehab:  Andrew Turner has had a number of cardiac events and is known to me. He declined additional MI education material. Wife is present at the bedside and patient states \"She is a cardiac nurse. \". The both report that the patient has \"drastically\" changed his eating and lifestyle habits. He is a 1 PPD smoker (at some point was up to 3 PPD) and he finds smoking \"relaxing\". He has never actively tried to quit. Discussed the effects of smoking on the heart. Informed him of our smoking cessation program and that information is on his discharge instructions. Offered cardiac rehab for further assistance with lifestyle modification. He continues to decline enrollment \"unless I can do it in the evening or on the weekends\". [address] cardiac rehab as they have extended hours. Patient reports he started their program and did not complete it. He has no interest in [address] [place]" should his situation change and he would like to participate with us. [facility] cardiac rehab contact information placed on the AVS. Andrew Turner and wife verbalized understanding with questions answered.

## 2019-02-21 NOTE — PROGRESS NOTES
08:00 SBAR report from [facility] 
 
08:15 Dr. Jose Marcum at bedside, updated patient. 08:30 Up to [facility] with minimal assist 
 
13:00 Resting, his wife is a bedside 13:49 Tylenol for generalized discomfort Zofran for nausea Feeling better TRANSFER - OUT REPORT: 
 
Verbal report given to Preston(name) on Nino Button  being transferred to [facility](unit) for routine progression of care Report consisted of patients Situation, Background, Assessment and  
Recommendations(SBAR). Information from the following report(s) SBAR, Kardex, Procedure Summary, Intake/Output, MAR, Accordion, Recent Results, Med Rec Status, Cardiac Rhythm NSR/SB, Alarm Parameters , Pre Procedure Checklist, Procedure Verification and Quality Measures was reviewed with the receiving nurse. Lines:  
Peripheral IV 02/20/19 Right Antecubital (Active) Site Assessment Clean, dry, & intact 2/21/2019  4:00 PM  
Phlebitis Assessment 0 2/21/2019  4:00 PM  
Infiltration Assessment 0 2/21/2019  4:00 PM  
Dressing Status Clean, dry, & intact 2/21/2019  4:00 PM  
Dressing Type Bacteriocidal;Transparent 2/21/2019  4:00 PM  
Hub Color/Line Status Green 2/21/2019  4:00 PM  
Action Taken Open ports on tubing capped 2/21/2019  4:00 PM  
Alcohol Cap Used Yes 2/21/2019  4:00 PM  
   
Peripheral IV 02/20/19 Left Forearm (Active) Site Assessment Clean, dry, & intact 2/21/2019  4:00 PM  
Phlebitis Assessment 0 2/21/2019  4:00 PM  
Infiltration Assessment 0 2/21/2019  4:00 PM  
Dressing Status Clean, dry, & intact 2/21/2019  4:00 PM  
Dressing Type Bacteriocidal;Transparent 2/21/2019  4:00 PM  
Hub Color/Line Status Green 2/21/2019  4:00 PM  
Action Taken Open ports on tubing capped 2/21/2019  4:00 PM  
Alcohol Cap Used Yes 2/21/2019  4:00 PM  
  
 
Opportunity for questions and clarification was provided. Patient transported with: 
 Registered Nurse Tech

## 2019-02-21 NOTE — PROGRESS NOTES
TRANSFER - IN REPORT: 
 
Verbal report received from Andrea Casillas RN(name) on Jeffery Diop  being received from [facility](unit) for routine progression of care Report consisted of patients Situation, Background, Assessment and  
Recommendations(SBAR). Information from the following report(s) SBAR, Kardex, Intake/Output, MAR, Accordion, Recent Results and Cardiac Rhythm -NSR was reviewed with the receiving nurse. Opportunity for questions and clarification was provided. Assessment completed upon patients arrival to unit and care assumed. Primary Nurse José Lopez RN and Morena Momin RN performed a dual skin assessment on this patient No impairment noted Current Bed: 
Total [facility] SPORT (air with burgundy cover) Carlos score is 21 Patient: Jeffery Diop MRN: [de-identified]    Age: 60 y.o. YOB: 1960 Room/Bed: [facility] Consent for video monitoring obtained after the below has been explained to the patient/family on 2/21/2019: 
1. They are being monitored continuously in an effort to promote their safety. 2. That there may be times when the camera will be discontinued to provide care to me to ensure my dignity, such as during bathing or any activity that risks me being exposed. 3. They have the right to opt out of having this surveillance monitoring at any time. 4. It has been explained to the patient/family and they understand that the hospital does not maintain any recording of this surveillance monitoring. José Lopez RN  
 
0130 - Pt up from [facility], A&Ox4, VSS, no c/o CP. R groin cath site intact, no hematoma or bleeding, pp+. CHG bath given. EKG obtained. Wife at bedside, plan of care reviewed. 0600 - AM labs drawn, EKG obtained. Bedside and Verbal shift change report given to Antonio Mckeon (oncoming nurse) by Xiao Torres (offgoing nurse).  Report included the following information SBAR, Kardex, Intake/Output, MAR, Accordion, Recent Results and Cardiac Rhythm -NSR.

## 2019-02-21 NOTE — ED PROVIDER NOTES
Pt went straight to cath lab and was not seen by me Past Medical History:  
Diagnosis Date  Diabetes ([address]  Glaucoma   
 right eye  Hypercholesteremia  Hypertension  Kidney stones  MI, old Past Surgical History:  
Procedure Laterality Date  HX CORONARY ARTERY BYPASS GRAFT    
 HX CORONARY STENT PLACEMENT    
 HX WISDOM TEETH EXTRACTION No family history on file. Social History Socioeconomic History  Marital status:  Spouse name: Not on file  Number of children: Not on file  Years of education: Not on file  Highest education level: Not on file Social Needs  Financial resource strain: Not on file  Food insecurity - worry: Not on file  Food insecurity - inability: Not on file  Transportation needs - medical: Not on file  Transportation needs - non-medical: Not on file Occupational History  Not on file Tobacco Use  Smoking status: Current Every Day Smoker Substance and Sexual Activity  Alcohol use: Yes  Drug use: No  
 Sexual activity: Not on file Other Topics Concern  Not on file Social History Narrative  Not on file ALLERGIES: Patient has no known allergies. Review of Systems There were no vitals filed for this visit. Physical Exam  
 
MDM Procedures

## 2019-02-21 NOTE — PROGRESS NOTES
Problem: Falls - Risk of 
Goal: *Absence of Falls Document Blane Dust Fall Risk and appropriate interventions in the flowsheet. Outcome: Progressing Towards Goal 
Fall Risk Interventions: 
  
 
  
 
Medication Interventions: Evaluate medications/consider consulting pharmacy, Patient to call before getting OOB Problem: Pressure Injury - Risk of 
Goal: *Prevention of pressure injury Document Carlos Scale and appropriate interventions in the flowsheet. Outcome: Progressing Towards Goal 
Pressure Injury Interventions: Activity Interventions: Increase time out of bed, Pressure redistribution bed/mattress(bed type) Nutrition Interventions: Document food/fluid/supplement intake

## 2019-02-21 NOTE — PROGRESS NOTES
08:00 SBAR report from [facility] 
 
08:15 Dr. Jacquelyn Dowling at bedside, updated patient. 08:30 Up to [facility] with minimal assist 
 
13:00 Resting, his wife is a bedside 13:49 Tylenol for generalized discomfort 15:13 Zofran for nausea 16:00 Feeling better TRANSFER - OUT REPORT: 
 
Verbal report given to Preston(name) on Brianna Tam  being transferred to [facility](unit) for routine progression of care Report consisted of patients Situation, Background, Assessment and  
Recommendations(SBAR). Information from the following report(s) SBAR, Kardex, Procedure Summary, Intake/Output, MAR, Accordion, Recent Results, Med Rec Status, Cardiac Rhythm NSR/SB, Alarm Parameters , Pre Procedure Checklist, Procedure Verification and Quality Measures was reviewed with the receiving nurse. Lines:  
Peripheral IV 02/20/19 Right Antecubital (Active) Site Assessment Clean, dry, & intact 2/21/2019  4:00 PM  
Phlebitis Assessment 0 2/21/2019  4:00 PM  
Infiltration Assessment 0 2/21/2019  4:00 PM  
Dressing Status Clean, dry, & intact 2/21/2019  4:00 PM  
Dressing Type Bacteriocidal;Transparent 2/21/2019  4:00 PM  
Hub Color/Line Status Green 2/21/2019  4:00 PM  
Action Taken Open ports on tubing capped 2/21/2019  4:00 PM  
Alcohol Cap Used Yes 2/21/2019  4:00 PM  
   
Peripheral IV 02/20/19 Left Forearm (Active) Site Assessment Clean, dry, & intact 2/21/2019  4:00 PM  
Phlebitis Assessment 0 2/21/2019  4:00 PM  
Infiltration Assessment 0 2/21/2019  4:00 PM  
Dressing Status Clean, dry, & intact 2/21/2019  4:00 PM  
Dressing Type Bacteriocidal;Transparent 2/21/2019  4:00 PM  
Hub Color/Line Status Green 2/21/2019  4:00 PM  
Action Taken Open ports on tubing capped 2/21/2019  4:00 PM  
Alcohol Cap Used Yes 2/21/2019  4:00 PM  
  
 
Opportunity for questions and clarification was provided. Patient transported with: 
 Registered Nurse Tech

## 2019-02-22 VITALS
SYSTOLIC BLOOD PRESSURE: 119 MMHG | HEART RATE: 60 BPM | WEIGHT: 247.36 LBS | TEMPERATURE: 98 F | HEIGHT: 73 IN | RESPIRATION RATE: 16 BRPM | OXYGEN SATURATION: 99 % | DIASTOLIC BLOOD PRESSURE: 69 MMHG | BODY MASS INDEX: 32.78 KG/M2

## 2019-02-22 LAB
ALBUMIN SERPL-MCNC: 3.4 G/DL (ref 3.5–5)
ALBUMIN/GLOB SERPL: 1.1 {RATIO} (ref 1.1–2.2)
ALP SERPL-CCNC: 112 U/L (ref 45–117)
ALT SERPL-CCNC: 32 U/L (ref 12–78)
ANION GAP SERPL CALC-SCNC: 7 MMOL/L (ref 5–15)
AST SERPL-CCNC: 28 U/L (ref 15–37)
ATRIAL RATE: 63 BPM
BASOPHILS # BLD: 0 K/UL (ref 0–0.1)
BASOPHILS NFR BLD: 0 % (ref 0–1)
BILIRUB SERPL-MCNC: 0.3 MG/DL (ref 0.2–1)
BUN SERPL-MCNC: 22 MG/DL (ref 6–20)
BUN/CREAT SERPL: 25 (ref 12–20)
CALCIUM SERPL-MCNC: 8.7 MG/DL (ref 8.5–10.1)
CALCULATED P AXIS, ECG09: 34 DEGREES
CALCULATED R AXIS, ECG10: -18 DEGREES
CALCULATED T AXIS, ECG11: 0 DEGREES
CHLORIDE SERPL-SCNC: 110 MMOL/L (ref 97–108)
CO2 SERPL-SCNC: 26 MMOL/L (ref 21–32)
CREAT SERPL-MCNC: 0.87 MG/DL (ref 0.7–1.3)
DIAGNOSIS, 93000: NORMAL
DIFFERENTIAL METHOD BLD: NORMAL
ECHO AO ROOT DIAM: 3.55 CM
ECHO AV AREA PEAK VELOCITY: 2 CM2
ECHO AV CUSP MM: 1.96 CM
ECHO AV PEAK GRADIENT: 11.1 MMHG
ECHO AV PEAK VELOCITY: 166.9 CM/S
ECHO LA MAJOR AXIS: 4.34 CM
ECHO LA TO AORTIC ROOT RATIO: 1.22
ECHO LV E' LATERAL VELOCITY: 11.41 CM/S
ECHO LV E' SEPTAL VELOCITY: 5.09 CM/S
ECHO LV INTERNAL DIMENSION DIASTOLIC: 6.36 CM (ref 4.2–5.9)
ECHO LV INTERNAL DIMENSION SYSTOLIC: 5 CM
ECHO LV IVSD: 1.16 CM (ref 0.6–1)
ECHO LV MASS 2D: 416.2 G (ref 88–224)
ECHO LV MASS INDEX 2D: 176.8 G/M2 (ref 49–115)
ECHO LV POSTERIOR WALL DIASTOLIC: 1.23 CM (ref 0.6–1)
ECHO LVOT DIAM: 1.89 CM
ECHO LVOT PEAK GRADIENT: 5.9 MMHG
ECHO LVOT PEAK VELOCITY: 121.79 CM/S
ECHO MV A VELOCITY: 95.17 CM/S
ECHO MV AREA PHT: 3.4 CM2
ECHO MV E DECELERATION TIME (DT): 226 MS
ECHO MV E VELOCITY: 1.09 CM/S
ECHO MV E/A RATIO: 0.01
ECHO MV E/E' LATERAL: 0.1
ECHO MV E/E' RATIO (AVERAGED): 0.15
ECHO MV E/E' SEPTAL: 0.21
ECHO MV PRESSURE HALF TIME (PHT): 65.5 MS
ECHO PV MAX VELOCITY: 135.81 CM/S
ECHO PV PEAK GRADIENT: 7.4 MMHG
ECHO RV INTERNAL DIMENSION: 3.34 CM
ECHO RV TAPSE: 1.7 CM (ref 1.5–2)
EOSINOPHIL # BLD: 0.3 K/UL (ref 0–0.4)
EOSINOPHIL NFR BLD: 3 % (ref 0–7)
ERYTHROCYTE [DISTWIDTH] IN BLOOD BY AUTOMATED COUNT: 13.2 % (ref 11.5–14.5)
GLOBULIN SER CALC-MCNC: 3.1 G/DL (ref 2–4)
GLUCOSE SERPL-MCNC: 116 MG/DL (ref 65–100)
HCT VFR BLD AUTO: 46.6 % (ref 36.6–50.3)
HGB BLD-MCNC: 15.5 G/DL (ref 12.1–17)
IMM GRANULOCYTES # BLD AUTO: 0 K/UL
IMM GRANULOCYTES NFR BLD AUTO: 0 %
LYMPHOCYTES # BLD: 3.2 K/UL (ref 0.8–3.5)
LYMPHOCYTES NFR BLD: 35 % (ref 12–49)
MCH RBC QN AUTO: 30.3 PG (ref 26–34)
MCHC RBC AUTO-ENTMCNC: 33.3 G/DL (ref 30–36.5)
MCV RBC AUTO: 91 FL (ref 80–99)
MONOCYTES # BLD: 0.5 K/UL (ref 0–1)
MONOCYTES NFR BLD: 6 % (ref 5–13)
NEUTS SEG # BLD: 5 K/UL (ref 1.8–8)
NEUTS SEG NFR BLD: 56 % (ref 32–75)
NRBC # BLD: 0 K/UL (ref 0–0.01)
NRBC BLD-RTO: 0 PER 100 WBC
P-R INTERVAL, ECG05: 218 MS
PLATELET # BLD AUTO: 191 K/UL (ref 150–400)
PLATELET COMMENTS,PCOM: NORMAL
PMV BLD AUTO: 9.7 FL (ref 8.9–12.9)
POTASSIUM SERPL-SCNC: 3.8 MMOL/L (ref 3.5–5.1)
PROT SERPL-MCNC: 6.5 G/DL (ref 6.4–8.2)
Q-T INTERVAL, ECG07: 422 MS
QRS DURATION, ECG06: 90 MS
QTC CALCULATION (BEZET), ECG08: 431 MS
RBC # BLD AUTO: 5.12 M/UL (ref 4.1–5.7)
RBC MORPH BLD: NORMAL
SODIUM SERPL-SCNC: 143 MMOL/L (ref 136–145)
TROPONIN I SERPL-MCNC: 4.28 NG/ML
VENTRICULAR RATE, ECG03: 63 BPM
WBC # BLD AUTO: 9 K/UL (ref 4.1–11.1)
WBC MORPH BLD: NORMAL

## 2019-02-22 PROCEDURE — 74011250637 HC RX REV CODE- 250/637: Performed by: INTERNAL MEDICINE

## 2019-02-22 PROCEDURE — 85025 COMPLETE CBC W/AUTO DIFF WBC: CPT

## 2019-02-22 PROCEDURE — 84484 ASSAY OF TROPONIN QUANT: CPT

## 2019-02-22 PROCEDURE — 36415 COLL VENOUS BLD VENIPUNCTURE: CPT

## 2019-02-22 PROCEDURE — 93005 ELECTROCARDIOGRAM TRACING: CPT

## 2019-02-22 PROCEDURE — 80053 COMPREHEN METABOLIC PANEL: CPT

## 2019-02-22 RX ADMIN — SACUBITRIL AND VALSARTAN 1 TABLET: 24; 26 TABLET, FILM COATED ORAL at 08:56

## 2019-02-22 RX ADMIN — DULOXETINE 30 MG: 30 CAPSULE, DELAYED RELEASE ORAL at 08:56

## 2019-02-22 RX ADMIN — FUROSEMIDE 40 MG: 40 TABLET ORAL at 08:56

## 2019-02-22 RX ADMIN — ACETAMINOPHEN 650 MG: 325 TABLET ORAL at 02:46

## 2019-02-22 RX ADMIN — GLIPIZIDE 10 MG: 5 TABLET, FILM COATED, EXTENDED RELEASE ORAL at 08:56

## 2019-02-22 RX ADMIN — ASPIRIN 81 MG CHEWABLE TABLET 81 MG: 81 TABLET CHEWABLE at 08:56

## 2019-02-22 RX ADMIN — PRASUGREL 10 MG: 10 TABLET, FILM COATED ORAL at 08:56

## 2019-02-22 NOTE — DISCHARGE SUMMARY
Cardiology Discharge Summary     Patient ID:  Channing Agar  [id number]  38 y.o.  1960    Admit Date: 2/20/2019    Discharge Date: 2/22/2019     Admitting Physician: Paul Ha MD     Discharge Physician: Brayden Muniz MD    Admission Diagnoses: STEMI (ST elevation myocardial infarction) [facility]) [I21.3]  Acute ST elevation myocardial infarction (STEMI) of inferior wall ([facility] [I21.19]    Discharge Diagnoses: Principal Problem:    STEMI (ST elevation myocardial infarction) ([address] (2/20/2019)    Active Problems:    Acute ST elevation myocardial infarction (STEMI) of inferior wall ([facility] [address] (2/21/2019)        Discharge Condition: Good    Cardiology Procedures this Admission:  Left heart catheterization with PCI    Hospital Course: Brought in with acute inferior MI and had stenting of occluded SVG to RCA with relief of pain and resolution of ST elevations. Rest of vasculature OK. EKG back at baseline with minimal troponin bump. No worsening LV function on ECHO with LVEF 40-45%. Feels well. Cautioned to DC cigarette smoking. Consults: None    Discharge Exam:     Visit Vitals  /69 (BP 1 Location: Right arm, BP Patient Position: At rest)   Pulse 60   Temp 98 °F (36.7 °C)   Resp 16   Ht 6' 1\" (1.854 m)   Wt 112.2 kg (247 lb 5.7 oz)   SpO2 99%   BMI 32.63 kg/m²     General Appearance:  Well developed, well nourished, in no acute distress. Ears/Nose/Mouth/Throat:   Hearing grossly normal.         Neck: Supple. Chest:   Lungs clear to auscultation bilaterally. Normal resp effort. Cardiovascular:  Regular rate and rhythm, S1, S2 normal, no new murmur. Abdomen:   Soft, non-tender, bowel sounds are present. Extremities: No edema bilaterally. Neuro:  Skin: A/O x 3, grossly nonfocal. Normal mood/affect. Warm and dry.                Disposition: home    Patient Instructions:   Current Discharge Medication List      CONTINUE these medications which have NOT CHANGED    Details   cholecalciferol, vitamin D3, (VITAMIN D3) 2,000 unit tab Take 2,000 Units by mouth daily. multivits,Stress Formula-Zinc tablet Take 1 Tab by mouth daily. sacubitril-valsartan (ENTRESTO) 24 mg/26 mg tablet Take 1 Tab by mouth every twelve (12) hours. Qty: 60 Tab, Refills: 11      furosemide (LASIX) 40 mg tablet Take 1 Tab by mouth daily. Qty: 30 Tab, Refills: 11      insulin glargine (LANTUS,BASAGLAR) 100 unit/mL (3 mL) inpn 85 Units by SubCUTAneous route nightly. evolocumab (REPATHA PUSHTRONEX SC) 10 mg by SubCUTAneous route every thirty (30) days. Every 2 weeks on Sunday; next dose due 5/20/18       dapagliflozin (FARXIGA) 10 mg tab tablet Take 10 mg by mouth daily. aspirin delayed-release 81 mg tablet Take 1 Tab by mouth daily. Qty: 30 Tab, Refills: 11      prasugrel (EFFIENT) 10 mg tablet Take 1 Tab by mouth nightly. Qty: 30 Tab, Refills: 11      nebivolol (BYSTOLIC) 5 mg tablet Take 5 mg by mouth every evening. DULoxetine (CYMBALTA) 30 mg capsule Take 30 mg by mouth daily. glipiZIDE SR (GLUCOTROL XL) 5 mg CR tablet Take 10 mg by mouth Before breakfast and dinner. Referenced discharge instructions provided by nursing for diet and activity. Follow-up with Dr. Jeremy Shah in 2 weeks.      Signed:  Mary Shirley MD  2/22/2019  10:03 AM

## 2019-02-22 NOTE — DISCHARGE INSTRUCTIONS
[address] [place] [address],  [address] [place] [address]    [phone]  Man Sparks     [email or web address]    Patient Discharge Instructions    [place] / [de-identified] : 1960    Admitted 2019 Discharged: 2019     Take Home Medications            · It is important that you take the medication exactly as they are prescribed. · Keep your medication in the bottles provided by the pharmacist and keep a list of the medication names, dosages, and times to be taken in your wallet. · Do not take other medications without consulting your doctor. BRING ALL OF YOUR MEDICINES TO YOUR OFFICE VISIT with Dr. Mik De León. What to do at Home    Recommended activity: Activity as tolerated. Follow-up with Marce Bowen MD in 2 weeks          Lifestyle changes  Do not smoke. Smoking increases your risk of another heart attack. If you need help quitting, talk to your doctor about stop-smoking programs and medicines. These can increase your chances of quitting for good. Eat a heart-healthy diet that is low in cholesterol, saturated fat, and salt, and is full of fruits, vegetables and whole-grains. Eat at least two servings of fish each week. You may get more details about how to eat healthy, but these tips can help you get started. Avoid colds and flu. Get a pneumococcal vaccine shot. If you have had one before, ask your doctor whether you need a second dose. Get a flu shot every fall. If you must be around people with colds or flu, wash your hands often. When should you call for help? Call 911 anytime you think you may need emergency care. For example, call if:  You have signs of a heart attack. These may include:  Chest pain or pressure. Sweating. Shortness of breath. Nausea or vomiting. Pain that spreads from the chest to the neck, jaw, or one or both shoulders or arms. Dizziness or lightheadedness. A fast or irregular pulse. After calling 911, chew 1 adult-strength aspirin. Wait for an ambulance. Do not try to drive yourself. You passed out (lost consciousness). You feel like you are having another heart attack. Call your doctor now or seek immediate medical care if:  You have had any chest pain, even if it has gone away. You have new or increased shortness of breath. You are dizzy or lightheaded, or you feel like you may faint. Watch closely for changes in your health, and be sure to contact your doctor if you have any problem      Information obtained by :  I understand that if any problems occur once I am at home I am to contact my physician. I understand and acknowledge receipt of the instructions indicated above. R.N.'s Signature                                                                  Date/Time                                                                                                                                              Patient or Representative Signature                                                          Date/Time      Keri Price MD         [address] [address]   [phone]  [address]    [email or web address]  Smoking Cessation Program: This is a free, phone/text/email based, smoking cessation program. The program is individualized to meet each patient's needs. To enroll use the link - [employer]. [email or web address]/quit or text Danica Chanel to 029 3308 from any smart phone.

## 2019-02-22 NOTE — PROGRESS NOTES
Reason for Admission:  STEMI 
                
RRAT Score:          11 Plan for utilizing home health:    [place] Likelihood of Readmission:  Mod - patient is a smoker - trying to wean down amount - declined smoking cessation resources Transition of Care Plan:            Follow-up appointment with PCP or cardiologist 
 
Lives with wife Care Management Interventions PCP Verified by CM: Yes Transition of Care Consult (CM Consult): ([facility]) MyChart Signup: No 
Discharge Durable Medical Equipment: No 
Physical Therapy Consult: No 
Occupational Therapy Consult: No 
Speech Therapy Consult: No 
Current Support Network: Own Home, Lives with Spouse Confirm Follow Up Transport: Family Plan discussed with Pt/Family/Caregiver: Yes Discharge Location Discharge Placement: Home

## 2019-02-22 NOTE — PROGRESS NOTES
02/22/19 1045 Six Minute Walk Report (PRE) Heart Rate 80  
O2 Saturation 94 Six Minute Walk Report (POST) Heart Rate 91  
O2 Saturation 97 Distance Walked in Feet (ft) 754 ft. Distance in Meters 229.82 meters Patient passed six minute walk test.

## 2019-02-22 NOTE — PROGRESS NOTES
Problem: Falls - Risk of 
Goal: *Absence of Falls Document Ira Lincoln Fall Risk and appropriate interventions in the flowsheet. Outcome: Progressing Towards Goal 
Fall Risk Interventions: 
  
 
  
 
Medication Interventions: Evaluate medications/consider consulting pharmacy Elimination Interventions: Urinal in reach, Call light in reach Problem: Pressure Injury - Risk of 
Goal: *Prevention of pressure injury Document Carlos Scale and appropriate interventions in the flowsheet. Outcome: Progressing Towards Goal 
Pressure Injury Interventions: Activity Interventions: Increase time out of bed, Pressure redistribution bed/mattress(bed type), PT/OT evaluation Mobility Interventions: HOB 30 degrees or less, Pressure redistribution bed/mattress (bed type), PT/OT evaluation Nutrition Interventions: Document food/fluid/supplement intake Friction and Shear Interventions: HOB 30 degrees or less, Lift sheet

## 2019-02-22 NOTE — PROGRESS NOTES
Bedside and Verbal shift change report given to [facility] (oncoming nurse) by Shelby Sosa RN (offgoing nurse). Report included the following information SBAR, Kardex, Intake/Output, MAR, Recent Results and Cardiac Rhythm NSR 1st degree. I have reviewed discharge instructions with the patient and spouse. The patient and spouse verbalized understanding.

## 2019-02-25 ENCOUNTER — PATIENT OUTREACH (OUTPATIENT)
Dept: OTHER | Age: 59
End: 2019-02-25

## 2019-02-25 NOTE — PROGRESS NOTES
Patient on [address] discharge report dated 2/25. Pt discharged on 2/22 for STEMI. Left message on voicemail. Will attempt to contact again. Need to complete post-discharge assessment.

## 2019-02-26 ENCOUNTER — PATIENT OUTREACH (OUTPATIENT)
Dept: OTHER | Age: 59
End: 2019-02-26

## 2019-02-26 NOTE — LETTER
2/26/2019 10:33 AM 
 
Mr. Ciara Singh [id number]  [address] Dear Mr. Clive Esteban, My name is Connor Berger, Employee Care Manager for [employer], and I have been trying to reach you. The Employee Care  is a free-of-charge, confidential service provided to our employees and their family members covered by the [employer]. Part of my job is to follow up with members who have recently been in the hospital or emergency room, to help them coordinate their care and answer questions they may have about their visit. I am able to provide assistance with medication questions, scheduling needed follow-up appointments, and arranging services like home health or home medical equipment. I can also provide education regarding your hospital or ER visit as well as your medical conditions. As healthcare providers, we know that patients do better when they have close follow up with a primary care provider (PCP), especially after a hospital or emergency department visit. If you do not have a PCP, I can help you find one that is convenient to you and covered by your insurance. I can also help you understand any after visit instructions, such as what symptoms to watch out for, or any new or changed medications. Remember that you can access your After Visit Summary by logging into your [employer] account. If you do not have a [employer] account, I can help you request access. Our program is designed to provide you with the opportunity to have a [employer] care manager partner with you for your healthcare needs. Please contact me at the below number if I can provide you with assistance for any of the above services. Sincerely, Connor Berger RN, BSN  Employee Care Manager [address] [place] [address], [address] [place], [place] [address] [facility] Cell [phone] Fax [email or web address] 
 [place] [email or web address]

## 2019-04-05 ENCOUNTER — PATIENT OUTREACH (OUTPATIENT)
Dept: OTHER | Age: 59
End: 2019-04-05

## 2019-07-05 NOTE — PROGRESS NOTES
Patient on [address] [facility] discharge report dated 5/24. Left message on voicemail. Will attempt to contact again. Need to complete post-discharge assessment.
35

## 2019-08-13 ENCOUNTER — PATIENT OUTREACH (OUTPATIENT)
Dept: OTHER | Age: 59
End: 2019-08-13

## 2019-08-13 NOTE — PROGRESS NOTES
CCM outreach    Patient on report as eligible for Case Management. Left discreet message on voicemail with this CM contact information. Will attempt to contact again to offer [address] [place] [address] Management services. Chart Review:     Pt previously admitted 6/19/2018 - 6/21/2018 (2 days) [facility]. Diagnosis: STEMI (ST elevation myocardial infarction) ([facility] [address] (2/20/2019). According to CC, pt had 3 inpatient admissions in 2018.

## 2019-08-15 ENCOUNTER — PATIENT OUTREACH (OUTPATIENT)
Dept: OTHER | Age: 59
End: 2019-08-15

## 2019-08-15 NOTE — PROGRESS NOTES
CCM Outreach    Patient identified as eligible for [address] [place] [address] services. Second telephone outreach attempted. Left discreet voicemail with this CM confidential contact information. Will send UTR letter.

## 2019-08-15 NOTE — LETTER
8/15/2019 2:12 PM 
 
Mr. Sharda Singh [address] [facility]  [address] Dear  Khushi Grissom, My name is Kasey Castrejon, Employee Care Manager for [employer] and I have been trying to reach you. The Employee Care  is a free-of-charge confidential service provided to our employees and their family members covered by the [facility]. The program will provide an employee and his/her family with the [facility] expertise to assist in navigating the health care delivery system, provider services, and their overall care needsso as to assure and improve health care interactions and enhance the quality of life. This program is designed to provide you with the opportunity to have a [employer] care manager partner with you for the following services: 
 
1.) Care transitions-such as when you come home from the hospital 
2.) When help is needed to manage your disease process 3.) When you are faced with managing a chronic or complex medical condition [employer] is dedicated to empowering the good health of its community and improving the quality of care and care experiences for employees and their families. We are commited to safeguarding patient confidentiality and privacy,  assuring that every employee has the respect he or she deserves in managing their health. The information shared with your care manager will not be shared with anyone else aside from those you identify as part of your care team, and will only be used to assist you with any identified care needs. Please contact me if you would like this service provided to you. Sincerely, Kasey Castrejon RN, [address] [place] [address]. [address] [place] [address].  
Cell [phone] Fax [email or web address]

## 2019-09-20 ENCOUNTER — PATIENT OUTREACH (OUTPATIENT)
Dept: OTHER | Age: 59
End: 2019-09-20

## 2019-09-20 NOTE — LETTER
9/20/2019 3:03 PM 
 
Mr. Gayle Singh [facility] [address] [facility] [address] Dear Mr. Valentino Medal, My name is Verónica, Employee Care Manager for [facility] and I have been trying to reach you. The Employee Care Management [facility]) program is a free-of-charge confidential service provided to our employees and their family members covered by the [facility]. The program will provide an employee and his/her family with the [facility] expertise to assist in navigating the health care delivery system, provider services, and their overall care needsso as to assure and improve health care interactions and enhance the quality of life. This program is designed to provide you with the opportunity to have a [facility] care manager partner with you for the following services: 
 
 1) when you come home from the hospital or emergency room 2) when help is needed to manage your disease 3) when you need assistance coordinating services or appointments [facility] is dedicated to empowering the good health of its community and improving the quality of care and care experiences for employees and their families. We are committed to safeguarding patient confidentiality and privacy, assuring that every employee has the respect he or she deserves in managing their health. The information shared with your care manager will not be shared with anyone else aside from those you identify as part of your care team, and will only be used to assist you with any identified care needs. Please contact me if you would like this service provided to you. Sincerely, Vandana Sal RN, [address] [place] [address]. [address] [place] [address].  
Cell [phone] Fax [email or web address]

## 2021-03-24 NOTE — PROGRESS NOTES
0730 Bedside and Verbal shift change report given to Ryan Garcia and fercho tabor  (oncoming nurse) by Maxi Cordova  (offgoing nurse). Report included the following information SBAR, Kardex, ED Summary, Procedure Summary, Intake/Output, MAR, Recent Results and Cardiac Rhythm nsr w/ pvcs . TRANSFER - OUT REPORT:    Verbal report given to Hodan Lisa RN (name) on Stefany Escobedo  being transferred to [facility](unit) for routine progression of care       Report consisted of patients Situation, Background, Assessment and   Recommendations(SBAR). Information from the following report(s) SBAR, ED Summary, Intake/Output, MAR, Recent Results and Cardiac Rhythm nsr  was reviewed with the receiving nurse. Lines:   Peripheral IV 06/19/18 Left; Inner Arm (Active)   Site Assessment Clean, dry, & intact 6/20/2018  8:00 AM   Phlebitis Assessment 0 6/20/2018  8:00 AM   Infiltration Assessment 0 6/20/2018  8:00 AM   Dressing Status Clean, dry, & intact 6/20/2018  8:00 AM   Dressing Type Transparent 6/20/2018  8:00 AM   Hub Color/Line Status Pink 6/20/2018  8:00 AM   Action Taken Open ports on tubing capped 6/20/2018  8:00 AM   Alcohol Cap Used Yes 6/20/2018  8:00 AM        Opportunity for questions and clarification was provided.       Patient transported with:   Monitor  Registered Nurse 9

## 2021-05-03 ENCOUNTER — HOSPITAL ENCOUNTER (INPATIENT)
Age: 61
LOS: 1 days | Discharge: HOME | DRG: 281 | End: 2021-05-04
Attending: EMERGENCY MEDICINE | Admitting: INTERNAL MEDICINE
Payer: COMMERCIAL

## 2021-05-03 DIAGNOSIS — I21.3 ST ELEVATION (STEMI) MYOCARDIAL INFARCTION (HCC): ICD-10-CM

## 2021-05-03 DIAGNOSIS — I21.9 ACUTE MYOCARDIAL INFARCTION, UNSPECIFIED MI TYPE, UNSPECIFIED ARTERY (HCC): Primary | ICD-10-CM

## 2021-05-03 DIAGNOSIS — I25.9 CHEST PAIN DUE TO MYOCARDIAL ISCHEMIA, UNSPECIFIED ISCHEMIC CHEST PAIN TYPE: ICD-10-CM

## 2021-05-03 DIAGNOSIS — R77.8 ELEVATED TROPONIN: ICD-10-CM

## 2021-05-03 LAB
ALBUMIN SERPL-MCNC: 3.9 G/DL (ref 3.5–5)
ALBUMIN/GLOB SERPL: 1.2 {RATIO} (ref 1.1–2.2)
ALP SERPL-CCNC: 99 U/L (ref 45–117)
ALT SERPL-CCNC: 46 U/L (ref 12–78)
ANION GAP SERPL CALC-SCNC: 2 MMOL/L (ref 5–15)
APTT PPP: 24.8 SEC (ref 22.1–31)
AST SERPL-CCNC: 148 U/L (ref 15–37)
ATRIAL RATE: 74 BPM
BASOPHILS # BLD: 0 K/UL (ref 0–0.1)
BASOPHILS NFR BLD: 0 % (ref 0–1)
BILIRUB SERPL-MCNC: 1 MG/DL (ref 0.2–1)
BUN SERPL-MCNC: 17 MG/DL (ref 6–20)
BUN/CREAT SERPL: 19 (ref 12–20)
CALCIUM SERPL-MCNC: 8.6 MG/DL (ref 8.5–10.1)
CALCULATED P AXIS, ECG09: 67 DEGREES
CALCULATED R AXIS, ECG10: 24 DEGREES
CALCULATED T AXIS, ECG11: -11 DEGREES
CHLORIDE SERPL-SCNC: 108 MMOL/L (ref 97–108)
CO2 SERPL-SCNC: 27 MMOL/L (ref 21–32)
COMMENT, HOLDF: NORMAL
COVID-19 RAPID TEST, COVR: NOT DETECTED
CREAT SERPL-MCNC: 0.9 MG/DL (ref 0.7–1.3)
DIAGNOSIS, 93000: NORMAL
DIFFERENTIAL METHOD BLD: ABNORMAL
EOSINOPHIL # BLD: 0.2 K/UL (ref 0–0.4)
EOSINOPHIL NFR BLD: 1 % (ref 0–7)
ERYTHROCYTE [DISTWIDTH] IN BLOOD BY AUTOMATED COUNT: 13.9 % (ref 11.5–14.5)
GLOBULIN SER CALC-MCNC: 3.2 G/DL (ref 2–4)
GLUCOSE BLD STRIP.AUTO-MCNC: 115 MG/DL (ref 65–100)
GLUCOSE BLD STRIP.AUTO-MCNC: 129 MG/DL (ref 65–100)
GLUCOSE SERPL-MCNC: 113 MG/DL (ref 65–100)
HCT VFR BLD AUTO: 45.1 % (ref 36.6–50.3)
HGB BLD-MCNC: 15.2 G/DL (ref 12.1–17)
IMM GRANULOCYTES # BLD AUTO: 0.1 K/UL (ref 0–0.04)
IMM GRANULOCYTES NFR BLD AUTO: 1 % (ref 0–0.5)
INR PPP: 1 (ref 0.9–1.1)
LYMPHOCYTES # BLD: 3.1 K/UL (ref 0.8–3.5)
LYMPHOCYTES NFR BLD: 23 % (ref 12–49)
MCH RBC QN AUTO: 31 PG (ref 26–34)
MCHC RBC AUTO-ENTMCNC: 33.7 G/DL (ref 30–36.5)
MCV RBC AUTO: 92 FL (ref 80–99)
MONOCYTES # BLD: 1.1 K/UL (ref 0–1)
MONOCYTES NFR BLD: 9 % (ref 5–13)
NEUTS SEG # BLD: 8.7 K/UL (ref 1.8–8)
NEUTS SEG NFR BLD: 66 % (ref 32–75)
NRBC # BLD: 0 K/UL (ref 0–0.01)
NRBC BLD-RTO: 0 PER 100 WBC
P-R INTERVAL, ECG05: 182 MS
PLATELET # BLD AUTO: 201 K/UL (ref 150–400)
PMV BLD AUTO: 9.6 FL (ref 8.9–12.9)
POTASSIUM SERPL-SCNC: 3.8 MMOL/L (ref 3.5–5.1)
PROT SERPL-MCNC: 7.1 G/DL (ref 6.4–8.2)
PROTHROMBIN TIME: 10.9 SEC (ref 9–11.1)
Q-T INTERVAL, ECG07: 432 MS
QRS DURATION, ECG06: 150 MS
QTC CALCULATION (BEZET), ECG08: 479 MS
RBC # BLD AUTO: 4.9 M/UL (ref 4.1–5.7)
SAMPLES BEING HELD,HOLD: NORMAL
SERVICE CMNT-IMP: ABNORMAL
SERVICE CMNT-IMP: ABNORMAL
SODIUM SERPL-SCNC: 137 MMOL/L (ref 136–145)
SOURCE, COVRS: NORMAL
THERAPEUTIC RANGE,PTTT: NORMAL SECS (ref 58–77)
TROPONIN I BLD-MCNC: 7.18 NG/ML (ref 0–0.08)
TROPONIN I SERPL-MCNC: 14.3 NG/ML
TROPONIN I SERPL-MCNC: 14.4 NG/ML
TROPONIN I SERPL-MCNC: 17.8 NG/ML
VENTRICULAR RATE, ECG03: 74 BPM
WBC # BLD AUTO: 13.2 K/UL (ref 4.1–11.1)

## 2021-05-03 PROCEDURE — B2181ZZ FLUOROSCOPY OF LEFT INTERNAL MAMMARY BYPASS GRAFT USING LOW OSMOLAR CONTRAST: ICD-10-PCS | Performed by: INTERNAL MEDICINE

## 2021-05-03 PROCEDURE — 74011250637 HC RX REV CODE- 250/637: Performed by: EMERGENCY MEDICINE

## 2021-05-03 PROCEDURE — B211YZZ FLUOROSCOPY OF MULTIPLE CORONARY ARTERIES USING OTHER CONTRAST: ICD-10-PCS | Performed by: INTERNAL MEDICINE

## 2021-05-03 PROCEDURE — 99284 EMERGENCY DEPT VISIT MOD MDM: CPT

## 2021-05-03 PROCEDURE — 93005 ELECTROCARDIOGRAM TRACING: CPT

## 2021-05-03 PROCEDURE — 96374 THER/PROPH/DIAG INJ IV PUSH: CPT

## 2021-05-03 PROCEDURE — 74011000636 HC RX REV CODE- 636: Performed by: INTERNAL MEDICINE

## 2021-05-03 PROCEDURE — 74011636637 HC RX REV CODE- 636/637: Performed by: INTERNAL MEDICINE

## 2021-05-03 PROCEDURE — 65660000000 HC RM CCU STEPDOWN

## 2021-05-03 PROCEDURE — 84484 ASSAY OF TROPONIN QUANT: CPT

## 2021-05-03 PROCEDURE — 77030019569 HC BND COMPR RAD TERU -B: Performed by: INTERNAL MEDICINE

## 2021-05-03 PROCEDURE — 4A023N7 MEASUREMENT OF CARDIAC SAMPLING AND PRESSURE, LEFT HEART, PERCUTANEOUS APPROACH: ICD-10-PCS | Performed by: INTERNAL MEDICINE

## 2021-05-03 PROCEDURE — C1769 GUIDE WIRE: HCPCS | Performed by: INTERNAL MEDICINE

## 2021-05-03 PROCEDURE — 36415 COLL VENOUS BLD VENIPUNCTURE: CPT

## 2021-05-03 PROCEDURE — 74011250636 HC RX REV CODE- 250/636: Performed by: EMERGENCY MEDICINE

## 2021-05-03 PROCEDURE — 77030010221 HC SPLNT WR POS TELE -B: Performed by: INTERNAL MEDICINE

## 2021-05-03 PROCEDURE — 77030039046 HC PAD DEFIB RADIOTRNSPNT CNMD -B: Performed by: INTERNAL MEDICINE

## 2021-05-03 PROCEDURE — 85610 PROTHROMBIN TIME: CPT

## 2021-05-03 PROCEDURE — 82962 GLUCOSE BLOOD TEST: CPT

## 2021-05-03 PROCEDURE — 74011250636 HC RX REV CODE- 250/636: Performed by: INTERNAL MEDICINE

## 2021-05-03 PROCEDURE — 93459 L HRT ART/GRFT ANGIO: CPT | Performed by: INTERNAL MEDICINE

## 2021-05-03 PROCEDURE — 85025 COMPLETE CBC W/AUTO DIFF WBC: CPT

## 2021-05-03 PROCEDURE — 85730 THROMBOPLASTIN TIME PARTIAL: CPT

## 2021-05-03 PROCEDURE — 74011250637 HC RX REV CODE- 250/637: Performed by: INTERNAL MEDICINE

## 2021-05-03 PROCEDURE — 80053 COMPREHEN METABOLIC PANEL: CPT

## 2021-05-03 PROCEDURE — 87635 SARS-COV-2 COVID-19 AMP PRB: CPT

## 2021-05-03 PROCEDURE — 99152 MOD SED SAME PHYS/QHP 5/>YRS: CPT | Performed by: INTERNAL MEDICINE

## 2021-05-03 PROCEDURE — 99153 MOD SED SAME PHYS/QHP EA: CPT | Performed by: INTERNAL MEDICINE

## 2021-05-03 PROCEDURE — B213YZZ FLUOROSCOPY OF MULTIPLE CORONARY ARTERY BYPASS GRAFTS USING OTHER CONTRAST: ICD-10-PCS | Performed by: INTERNAL MEDICINE

## 2021-05-03 PROCEDURE — 74011000250 HC RX REV CODE- 250: Performed by: INTERNAL MEDICINE

## 2021-05-03 PROCEDURE — 77030004538 HC CATH ANGI DX MP BSC -A: Performed by: INTERNAL MEDICINE

## 2021-05-03 RX ORDER — HEPARIN SODIUM 10000 [USP'U]/100ML
8-25 INJECTION, SOLUTION INTRAVENOUS
Status: DISCONTINUED | OUTPATIENT
Start: 2021-05-03 | End: 2021-05-03

## 2021-05-03 RX ORDER — OXYCODONE AND ACETAMINOPHEN 5; 325 MG/1; MG/1
1 TABLET ORAL
Status: DISCONTINUED | OUTPATIENT
Start: 2021-05-03 | End: 2021-05-04 | Stop reason: HOSPADM

## 2021-05-03 RX ORDER — MORPHINE SULFATE 4 MG/ML
4 INJECTION INTRAVENOUS
Status: COMPLETED | OUTPATIENT
Start: 2021-05-03 | End: 2021-05-03

## 2021-05-03 RX ORDER — ASPIRIN 81 MG/1
81 TABLET ORAL DAILY
Status: DISCONTINUED | OUTPATIENT
Start: 2021-05-03 | End: 2021-05-04 | Stop reason: HOSPADM

## 2021-05-03 RX ORDER — SODIUM CHLORIDE 0.9 % (FLUSH) 0.9 %
5-40 SYRINGE (ML) INJECTION AS NEEDED
Status: DISCONTINUED | OUTPATIENT
Start: 2021-05-03 | End: 2021-05-04 | Stop reason: HOSPADM

## 2021-05-03 RX ORDER — SODIUM CHLORIDE 9 MG/ML
INJECTION, SOLUTION INTRAVENOUS
Status: COMPLETED | OUTPATIENT
Start: 2021-05-03 | End: 2021-05-03

## 2021-05-03 RX ORDER — ENOXAPARIN SODIUM 100 MG/ML
40 INJECTION SUBCUTANEOUS DAILY
Status: DISCONTINUED | OUTPATIENT
Start: 2021-05-04 | End: 2021-05-03

## 2021-05-03 RX ORDER — FUROSEMIDE 10 MG/ML
INJECTION INTRAMUSCULAR; INTRAVENOUS AS NEEDED
Status: DISCONTINUED | OUTPATIENT
Start: 2021-05-03 | End: 2021-05-03 | Stop reason: HOSPADM

## 2021-05-03 RX ORDER — ACETAMINOPHEN 325 MG/1
650 TABLET ORAL
Status: DISCONTINUED | OUTPATIENT
Start: 2021-05-03 | End: 2021-05-04 | Stop reason: HOSPADM

## 2021-05-03 RX ORDER — POLYETHYLENE GLYCOL 3350 17 G/17G
17 POWDER, FOR SOLUTION ORAL DAILY PRN
Status: DISCONTINUED | OUTPATIENT
Start: 2021-05-03 | End: 2021-05-04 | Stop reason: HOSPADM

## 2021-05-03 RX ORDER — DIPHENHYDRAMINE HYDROCHLORIDE 50 MG/ML
25 INJECTION, SOLUTION INTRAMUSCULAR; INTRAVENOUS
Status: DISCONTINUED | OUTPATIENT
Start: 2021-05-03 | End: 2021-05-04 | Stop reason: HOSPADM

## 2021-05-03 RX ORDER — GUAIFENESIN 100 MG/5ML
324 LIQUID (ML) ORAL
Status: COMPLETED | OUTPATIENT
Start: 2021-05-03 | End: 2021-05-03

## 2021-05-03 RX ORDER — TORSEMIDE 20 MG/1
40 TABLET ORAL DAILY
Status: DISCONTINUED | OUTPATIENT
Start: 2021-05-04 | End: 2021-05-04 | Stop reason: HOSPADM

## 2021-05-03 RX ORDER — HEPARIN SODIUM 5000 [USP'U]/ML
4000 INJECTION, SOLUTION INTRAVENOUS; SUBCUTANEOUS ONCE
Status: DISCONTINUED | OUTPATIENT
Start: 2021-05-03 | End: 2021-05-03

## 2021-05-03 RX ORDER — PROMETHAZINE HYDROCHLORIDE 25 MG/1
12.5 TABLET ORAL
Status: DISCONTINUED | OUTPATIENT
Start: 2021-05-03 | End: 2021-05-04 | Stop reason: HOSPADM

## 2021-05-03 RX ORDER — VERAPAMIL HYDROCHLORIDE 2.5 MG/ML
INJECTION, SOLUTION INTRAVENOUS AS NEEDED
Status: DISCONTINUED | OUTPATIENT
Start: 2021-05-03 | End: 2021-05-03 | Stop reason: HOSPADM

## 2021-05-03 RX ORDER — HEPARIN SODIUM 200 [USP'U]/100ML
INJECTION, SOLUTION INTRAVENOUS
Status: COMPLETED | OUTPATIENT
Start: 2021-05-03 | End: 2021-05-03

## 2021-05-03 RX ORDER — HEPARIN SODIUM 1000 [USP'U]/ML
INJECTION, SOLUTION INTRAVENOUS; SUBCUTANEOUS AS NEEDED
Status: DISCONTINUED | OUTPATIENT
Start: 2021-05-03 | End: 2021-05-03 | Stop reason: HOSPADM

## 2021-05-03 RX ORDER — ENOXAPARIN SODIUM 150 MG/ML
111 INJECTION SUBCUTANEOUS EVERY 12 HOURS
Status: DISCONTINUED | OUTPATIENT
Start: 2021-05-03 | End: 2021-05-04

## 2021-05-03 RX ORDER — NALOXONE HYDROCHLORIDE 0.4 MG/ML
0.4 INJECTION, SOLUTION INTRAMUSCULAR; INTRAVENOUS; SUBCUTANEOUS AS NEEDED
Status: DISCONTINUED | OUTPATIENT
Start: 2021-05-03 | End: 2021-05-04 | Stop reason: HOSPADM

## 2021-05-03 RX ORDER — GLIPIZIDE 5 MG/1
10 TABLET ORAL 2 TIMES DAILY WITH MEALS
Status: DISCONTINUED | OUTPATIENT
Start: 2021-05-03 | End: 2021-05-04 | Stop reason: HOSPADM

## 2021-05-03 RX ORDER — LIDOCAINE HYDROCHLORIDE 10 MG/ML
INJECTION INFILTRATION; PERINEURAL AS NEEDED
Status: DISCONTINUED | OUTPATIENT
Start: 2021-05-03 | End: 2021-05-03 | Stop reason: HOSPADM

## 2021-05-03 RX ORDER — MIDAZOLAM HYDROCHLORIDE 1 MG/ML
INJECTION, SOLUTION INTRAMUSCULAR; INTRAVENOUS AS NEEDED
Status: DISCONTINUED | OUTPATIENT
Start: 2021-05-03 | End: 2021-05-03 | Stop reason: HOSPADM

## 2021-05-03 RX ORDER — DULOXETIN HYDROCHLORIDE 30 MG/1
30 CAPSULE, DELAYED RELEASE ORAL DAILY
Status: DISCONTINUED | OUTPATIENT
Start: 2021-05-03 | End: 2021-05-04 | Stop reason: HOSPADM

## 2021-05-03 RX ORDER — PRASUGREL 10 MG/1
10 TABLET, FILM COATED ORAL
Status: DISCONTINUED | OUTPATIENT
Start: 2021-05-03 | End: 2021-05-04 | Stop reason: HOSPADM

## 2021-05-03 RX ORDER — ACETAMINOPHEN 650 MG/1
650 SUPPOSITORY RECTAL
Status: DISCONTINUED | OUTPATIENT
Start: 2021-05-03 | End: 2021-05-04 | Stop reason: HOSPADM

## 2021-05-03 RX ORDER — METOPROLOL SUCCINATE 50 MG/1
50 TABLET, EXTENDED RELEASE ORAL
Status: DISCONTINUED | OUTPATIENT
Start: 2021-05-03 | End: 2021-05-04 | Stop reason: HOSPADM

## 2021-05-03 RX ORDER — INSULIN GLARGINE 100 [IU]/ML
85 INJECTION, SOLUTION SUBCUTANEOUS
Status: DISCONTINUED | OUTPATIENT
Start: 2021-05-03 | End: 2021-05-04 | Stop reason: HOSPADM

## 2021-05-03 RX ORDER — FENTANYL CITRATE 50 UG/ML
INJECTION, SOLUTION INTRAMUSCULAR; INTRAVENOUS AS NEEDED
Status: DISCONTINUED | OUTPATIENT
Start: 2021-05-03 | End: 2021-05-03 | Stop reason: HOSPADM

## 2021-05-03 RX ORDER — ONDANSETRON 2 MG/ML
4 INJECTION INTRAMUSCULAR; INTRAVENOUS
Status: DISCONTINUED | OUTPATIENT
Start: 2021-05-03 | End: 2021-05-04 | Stop reason: HOSPADM

## 2021-05-03 RX ORDER — SODIUM CHLORIDE 0.9 % (FLUSH) 0.9 %
5-40 SYRINGE (ML) INJECTION EVERY 8 HOURS
Status: DISCONTINUED | OUTPATIENT
Start: 2021-05-03 | End: 2021-05-04 | Stop reason: HOSPADM

## 2021-05-03 RX ORDER — NITROGLYCERIN 0.4 MG/1
0.4 TABLET SUBLINGUAL
Status: DISCONTINUED | OUTPATIENT
Start: 2021-05-03 | End: 2021-05-04 | Stop reason: HOSPADM

## 2021-05-03 RX ADMIN — DULOXETINE HYDROCHLORIDE 30 MG: 30 CAPSULE, DELAYED RELEASE ORAL at 14:44

## 2021-05-03 RX ADMIN — NITROGLYCERIN 0.4 MG: 0.4 TABLET, ORALLY DISINTEGRATING SUBLINGUAL at 09:49

## 2021-05-03 RX ADMIN — Medication 10 ML: at 21:56

## 2021-05-03 RX ADMIN — OXYCODONE HYDROCHLORIDE AND ACETAMINOPHEN 1 TABLET: 5; 325 TABLET ORAL at 22:05

## 2021-05-03 RX ADMIN — ASPIRIN 81 MG: 81 TABLET, COATED ORAL at 14:44

## 2021-05-03 RX ADMIN — GLIPIZIDE 10 MG: 5 TABLET ORAL at 16:45

## 2021-05-03 RX ADMIN — PRASUGREL HYDROCHLORIDE 10 MG: 10 TABLET, FILM COATED ORAL at 21:51

## 2021-05-03 RX ADMIN — MORPHINE SULFATE 4 MG: 4 INJECTION, SOLUTION INTRAMUSCULAR; INTRAVENOUS at 09:46

## 2021-05-03 RX ADMIN — OXYCODONE HYDROCHLORIDE AND ACETAMINOPHEN 1 TABLET: 5; 325 TABLET ORAL at 16:44

## 2021-05-03 RX ADMIN — SACUBITRIL AND VALSARTAN 1 TABLET: 24; 26 TABLET, FILM COATED ORAL at 21:55

## 2021-05-03 RX ADMIN — INSULIN GLARGINE 85 UNITS: 100 INJECTION, SOLUTION SUBCUTANEOUS at 21:50

## 2021-05-03 RX ADMIN — NITROGLYCERIN 0.4 MG: 0.4 TABLET, ORALLY DISINTEGRATING SUBLINGUAL at 16:02

## 2021-05-03 RX ADMIN — ENOXAPARIN SODIUM 111 MG: 120 INJECTION SUBCUTANEOUS at 16:53

## 2021-05-03 RX ADMIN — Medication 10 ML: at 21:53

## 2021-05-03 RX ADMIN — ASPIRIN 324 MG: 81 TABLET, CHEWABLE ORAL at 09:45

## 2021-05-03 RX ADMIN — METOPROLOL SUCCINATE 50 MG: 50 TABLET, EXTENDED RELEASE ORAL at 21:52

## 2021-05-03 NOTE — PROGRESS NOTES
TRANSFER - IN REPORT:    Verbal report received from [facility]  on Thedora Severe  being received from cath lab procedure area  for routine progression of care. Report consisted of patients Situation, Background, Assessment and Recommendations(SBAR). Information from the following report(s) Kardex and Procedure Summary was reviewed with the receiving clinician. Opportunity for questions and clarification was provided. Assessment completed upon patients arrival to [address] [place] [address] and care assumed. Cardiac Cath Lab Recovery Arrival Note:    Thedora Severe arrived to [facility] recovery area. Patient procedure= LHC. Patient on cardiac monitor, non-invasive blood pressure, SPO2 monitor. On  O2 @ 2 lpm via NC.  IV  of NS on pump at 25 ml/hr. Patient status doing well without problems. Patient is A&Ox 3. Patient reports no pain. PROCEDURE SITE CHECK:    Procedure site:without any bleeding, No pain/discomfort reported at procedure site. No change in patient status. Continue to monitor patient and status.

## 2021-05-03 NOTE — H&P
CARDIOLOGY ADMIT    Subjective:    Date of  Admission:   Admission type:Emergency    Carlos Holt NP     This is a 66 yom with CP. Pain started lat night with left sided cehst pain which radites to the left arm. It is sharp in nature and similar to previous MIs. There were no associated symptoms but this is not uncommon. He has not taken his AM meds. Pain was not relieved with NTG  In the ED, he received doses of ASA and heparin. First troponin abnormal. Received morphine which significantly eased the pain. Patient Active Problem List   Diagnosis Code    NSTEMI (non-ST elevated myocardial infarction) ([facility] S52.4    [facility]st[facility], acute [facility] I50.21    Unstable angina (HCC) I20.0    STEMI (ST elevation myocardial infarction) ([facility] I21.3    Acute ST elevation myocardial infarction (STEMI) of inferior wall (HCC) I21.19        Past Medical History:   Diagnosis Date    Diabetes [facility]     Glaucoma     right eye    Hypercholesteremia     Hypertension     Kidney stones     MI, old       Past Surgical History:   Procedure Laterality Date    HX CORONARY ARTERY BYPASS GRAFT      HX CORONARY STENT PLACEMENT      HX WISDOM TEETH EXTRACTION        History reviewed. No pertinent family history. Social History     Socioeconomic History    Marital status:      Spouse name: Not on file    Number of children: Not on file    Years of education: Not on file    Highest education level: Not on file   Occupational History    Not on file   Social Needs    Financial resource strain: Not on file    Food insecurity     Worry: Not on file     Inability: Not on file    Transportation needs     Medical: Not on file     Non-medical: Not on file   Tobacco Use    Smoking status: Current Every Day Smoker    Smokeless tobacco: Never Used   Substance and Sexual Activity    Alcohol use:  Yes    Drug use: No    Sexual activity: Not on file   Lifestyle    Physical activity     Days per week: Not on file     Minutes per session: Not on file    Stress: Not on file   Relationships    Social connections     Talks on phone: Not on file     Gets together: Not on file     Attends Hinduism service: Not on file     Active member of club or organization: Not on file     Attends meetings of clubs or organizations: Not on file     Relationship status: Not on file    Intimate partner violence     Fear of current or ex partner: Not on file     Emotionally abused: Not on file     Physically abused: Not on file     Forced sexual activity: Not on file   Other Topics Concern    Not on file   Social History Narrative    Not on file        Current Facility-Administered Medications   Medication Dose Route Frequency    nitroglycerin (NITROSTAT) tablet 0.4 mg  0.4 mg SubLINGual Q5MIN PRN    heparin (porcine) injection 4,000 Units  4,000 Units IntraVENous ONCE    heparin 25,000 units in D5W 250 ml infusion  8-25 Units/kg/hr IntraVENous TITRATE    sodium chloride (NS) flush 5-40 mL  5-40 mL IntraVENous Q8H    sodium chloride (NS) flush 5-40 mL  5-40 mL IntraVENous PRN    acetaminophen (TYLENOL) tablet 650 mg  650 mg Oral Q6H PRN    Or    acetaminophen (TYLENOL) suppository 650 mg  650 mg Rectal Q6H PRN    polyethylene glycol (MIRALAX) packet 17 g  17 g Oral DAILY PRN    promethazine (PHENERGAN) tablet 12.5 mg  12.5 mg Oral Q6H PRN    Or    ondansetron (ZOFRAN) injection 4 mg  4 mg IntraVENous Q6H PRN    [START ON 5/4/2021] enoxaparin (LOVENOX) injection 40 mg  40 mg SubCUTAneous DAILY    aspirin delayed-release tablet 81 mg  81 mg Oral DAILY    . PHARMACY TO SUBSTITUTE PER PROTOCOL (Reordered from: dapagliflozin (FARXIGA) 10 mg tab tablet)    Per Protocol    DULoxetine (CYMBALTA) capsule 30 mg  30 mg Oral DAILY    . PHARMACY TO SUBSTITUTE PER PROTOCOL (Reordered from: glipiZIDE SR (GLUCOTROL XL) 5 mg CR tablet)    Per Protocol    . PHARMACY TO SUBSTITUTE PER PROTOCOL (Reordered from: nebivolol (BYSTOLIC) 5 mg tablet)    Per Protocol    sacubitriL-valsartan (ENTRESTO) 24-26 mg tablet 1 Tab  1 Tab Oral Q12H    . PHARMACY TO SUBSTITUTE PER PROTOCOL (Reordered from: insulin glargine (LANTUS,BASAGLAR) 100 unit/mL (3 mL) inpn)    Per Protocol             Review of Symptoms:    Gen - no F/C/S  Eyes - no vision changes  ENT - no sore throat, rhinorrhea, otalgia  CV - +o CP, no palpitations, no orthopnea, no PND, no ANGEL  Resp no cough, no SOB/MILLARD  GI - no AP, no n/v/d/c   - no dysuria, no hematuria  MSK - no abnormal joint pains  Skin - no rashes  Neuro - no HA, no numbness, no weakness, no slurred speech  Psych - no change in mood       Physical Exam    /76 (BP 1 Location: Right upper arm, BP Patient Position: At rest)   Pulse 72   Resp 15   Ht 6' 1\" (1.854 m)   Wt 113.4 kg (250 lb)   SpO2 93%   BMI 32.98 kg/m²      NAD  Skin warm and dry  Nl conjunctiva  Oropharynx without exudate. Neck supple  Lungs clear  Normal S1/ S2 with occasional ectopy  Abdomen soft and non tender  Pulses 2+ radials  Neuro:  Grossly intact  Appropriate       Cardiographics    Telemetry: normal sinus rhythm  ECG: normal sinus rhythm, RBBB  Echocardiogram: pending        Assessment: This is a 57 yom with CAD s/p CABG here with ACS    Plan:    Urgently taken to cath lab for [facility]  Received loading doses of ASA and heparin in ED  Held daily lasix dose   Held Effient which can be redosed after PCI  Echo ordered  Cardiac rehab  Labs in AM    Will transfer care to primary cardiologist, Dr. Cheryl Harada. Dr. Hiro White will perform cath.  Please call with questions      CCT 31-74 minutes

## 2021-05-03 NOTE — PROGRESS NOTES
The following SGLT2 inhibitor has been discontinued per [facility] approved policy:      dapagliflozin (FARXIGA) 10 mg tab tablet, Take 10 mg by mouth daily. Please reorder upon discharge if appropriate.

## 2021-05-03 NOTE — PROGRESS NOTES
Cardiac Cath Lab Procedure Area Arrival Note:    Richie Ortiz arrived to Cardiac Cath Lab, Procedure Area. Patient identifiers verified with NAME and DATE OF BIRTH. Procedure verified with patient. Consent forms verified. Allergies verified. Patient informed of procedure and plan of care. Questions answered with review. Patient voiced understanding of procedure and plan of care. Patient on cardiac monitor, non-invasive blood pressure, SPO2 monitor. On RA and placed on O2 @ 2 lpm via NC.  IV of NSS on pump at 125 ml/hr. Patient status doing well without problems. Patient is A&Ox 4. Patient reports 5/10 midsternal chest pain, but no shortness of breath. Patient medicated during procedure with orders obtained and verified by Dr. Enio Saldaña. Refer to patients Cardiac Cath Lab PROCEDURE REPORT for vital signs, assessment, status, and response during procedure, printed at end of case. Printed report on chart or scanned into chart. 1120 Transfer to [facility] from Procedure Area    Verbal report given to CARLOS Chung on Eldon Rough being transferred to Cardiac Cath Lab  for routine progression of care   Patient is post LHC with grafts procedure. Patient stable upon transfer to . Report consisted of patients Situation, Background, Assessment and   Recommendations(SBAR). Information from the following report(s) SBAR, Procedure Summary and MAR was reviewed with the receiving nurse. Opportunity for questions and clarification was provided. Patient medicated during procedure with orders obtained and verified by Dr. Enio Saldaña. Refer to patient PROCEDURE REPORT for vital signs, assessment, status, and response during procedure.

## 2021-05-03 NOTE — PROGRESS NOTES
Wife, Hannah Camacho, requested patient be given an order of evolocumab (Rapatha Pushtronex SC) be given at discharge because patient missed appointment to have renewed due to hospital stay.

## 2021-05-03 NOTE — ED TRIAGE NOTES
EKG done, STEMI called, pt taken to room 17, pt c/o mid chest pain that woke him up from his sleep, +cough, denies fever or chills, denies n/v, radiates to left arm

## 2021-05-03 NOTE — ED PROVIDER NOTES
HPI       58y M with CAD here with chest pain. Started yesterday. Is in the L chest and radiates into the L arm. Sharp in nature. Feels like pain he's had in the past with MI's. No shortness of breath. No nausea or vomiting. No diaphoresis. Did not take his ASA this AM. Did try taking NTG but it didn't relieve the pain. No abdominal pain. Past Medical History:   Diagnosis Date    Diabetes ([facility]     Glaucoma     right eye    Hypercholesteremia     Hypertension     Kidney stones     MI, old        Past Surgical History:   Procedure Laterality Date    HX CORONARY ARTERY BYPASS GRAFT      HX CORONARY STENT PLACEMENT      HX WISDOM TEETH EXTRACTION           No family history on file. Social History     Socioeconomic History    Marital status:      Spouse name: Not on file    Number of children: Not on file    Years of education: Not on file    Highest education level: Not on file   Occupational History    Not on file   Social Needs    Financial resource strain: Not on file    Food insecurity     Worry: Not on file     Inability: Not on file    Transportation needs     Medical: Not on file     Non-medical: Not on file   Tobacco Use    Smoking status: Current Every Day Smoker   Substance and Sexual Activity    Alcohol use:  Yes    Drug use: No    Sexual activity: Not on file   Lifestyle    Physical activity     Days per week: Not on file     Minutes per session: Not on file    Stress: Not on file   Relationships    Social connections     Talks on phone: Not on file     Gets together: Not on file     Attends Roman Catholic service: Not on file     Active member of club or organization: Not on file     Attends meetings of clubs or organizations: Not on file     Relationship status: Not on file    Intimate partner violence     Fear of current or ex partner: Not on file     Emotionally abused: Not on file     Physically abused: Not on file     Forced sexual activity: Not on file   Other Topics Concern    Not on file   Social History Narrative    Not on file         ALLERGIES: Tape [adhesive]    Review of Systems   Review of Systems   Constitutional: (-) weight loss. HEENT: (-) stiff neck   Eyes: (-) discharge. Respiratory: (-) cough. Cardiovascular: (-) syncope. Gastrointestinal: (-) blood in stool. Genitourinary: (-) hematuria. Musculoskeletal: (-) myalgias. Neurological: (-) seizure. Skin: (-) petechiae  Lymph/Immunologic: (-) enlarged lymph nodes  All other systems reviewed and are negative. There were no vitals filed for this visit. Physical Exam Nursing note and vitals reviewed. Constitutional: oriented to person, place, and time. appears well-developed and well-nourished. No distress. Head: Normocephalic and atraumatic. Sclera anicteric  Nose: No rhinorrhea  Mouth/Throat: Oropharynx is clear and moist. Pharynx normal  Eyes: Conjunctivae are normal. Pupils are equal, round, and reactive to light. Right eye exhibits no discharge. Left eye exhibits no discharge. Neck: Painless normal range of motion. Neck supple. No LAD. Cardiovascular: Normal rate, regular rhythm, normal heart sounds and intact distal pulses. Exam reveals no gallop and no friction rub. No murmur heard. Pulmonary/Chest:  No respiratory distress. No wheezes. No rales. No rhonchi. No increased work of breathing. No accessory muscle use. Good air exchange throughout. Abdominal: soft, non-tender, no rebound or guarding. No hepatosplenomegaly. Normal bowel sounds throughout. Back: no tenderness to palpation, no deformities, no CVA tenderness  Extremities/Musculoskeletal: Normal range of motion. no tenderness. No edema. Distal extremities are neurovasc intact. Lymphadenopathy:   No adenopathy. Neurological:  Alert and oriented to person, place, and time. Coordination normal. CN 2-12 intact. Motor and sensory function intact. Skin: Skin is warm and dry. No rash noted. No pallor.         MDM 61y Synetta Brood here with chest pain. Code STEMI called on arrival. Plan for ASA, morphine, NTG. Procedures    ED EKG interpretation:  Rhythm: normal sinus rhythm; and regular . Rate (approx.): 74; Axis: normal; P wave: normal; QRS interval: normal ; ST/T wave: elevated in inf leads; This EKG was interpreted by Dalila Haines MD,ED Provider.     Total critical care time (excluding procedures): 35 min

## 2021-05-03 NOTE — PROGRESS NOTES
Spiritual Care Assessment/Progress Note  [facility]      NAME: Zulma Lisa      MRN: [de-identified]  AGE: 57 y.o. SEX: male  Congregation Affiliation: Episcopal   Language: English     5/3/2021     Total Time (in minutes): 23     Spiritual Assessment begun in [address] through conversation with:         []Patient        [x] Family    [] Friend(s)        Reason for Consult: Crisis, Stemi     Spiritual beliefs: (Please include comment if needed)     [x] Identifies with a jovi tradition:         [] Supported by a jovi community:            [] Claims no spiritual orientation:           [] Seeking spiritual identity:                [] Adheres to an individual form of spirituality:           [] Not able to assess:                           Identified resources for coping:      [x] Prayer                               [] Music                  [] Guided Imagery     [x] Family/friends                 [] Pet visits     [] Devotional reading                         [] Unknown     [] Other:                                             Interventions offered during this visit: (See comments for more details)          Family/Friend(s): Affirmation of emotions/emotional suffering, Affirmation of jovi, Iconic (affirming the presence of God/Higher Power), Prayer (assurance of)     Plan of Care:     [] Support spiritual and/or cultural needs    [] Support AMD and/or advance care planning process      [] Support grieving process   [] Coordinate Rites and/or Rituals    [] Coordination with community clergy   [] No spiritual needs identified at this time   [] Detailed Plan of Care below (See Comments)  [] Make referral to Music Therapy  [] Make referral to Pet Therapy     [] Make referral to Addiction services  [] Make referral to [facility]  [] Make referral to Spiritual Care Partner  [] No future visits requested        [x] Follow up visits as needed     Visited in response to a code stemi in ED.  Met pt's wife Sen Haney at door of pt's room. Leena Maldonado remembers . She spoke of their jovi and belief that the pt is in God's hands and will be fine. She expressed no spiritual care needs at the time of visit.   Chaplain Myles MDiv, MS, [facility]  [phone])

## 2021-05-03 NOTE — PROGRESS NOTES
TRANSFER - OUT REPORT:    Verbal report given to Izabela Abebe 82 on Megha Bach being transferred to [employer]  for routine progression of care       Report consisted of patients Situation, Background, Assessment and   Recommendations(SBAR). Information from the following report(s) Kardex and Procedure Summary was reviewed with the receiving nurse. Opportunity for questions and clarification was provided.

## 2021-05-03 NOTE — ED NOTES
Rapid COVID  Test sent, POC troponin 7.18, awaiting cards. Oxygen applied to patient via NC @ 2 L. Wife, Tavo Chavez can be reached at [phone].

## 2021-05-04 ENCOUNTER — APPOINTMENT (OUTPATIENT)
Dept: NON INVASIVE DIAGNOSTICS | Age: 61
DRG: 281 | End: 2021-05-04
Attending: INTERNAL MEDICINE
Payer: COMMERCIAL

## 2021-05-04 ENCOUNTER — TRANSCRIBE ORDER (OUTPATIENT)
Dept: CARDIAC REHAB | Age: 61
End: 2021-05-04

## 2021-05-04 VITALS
SYSTOLIC BLOOD PRESSURE: 106 MMHG | WEIGHT: 242 LBS | OXYGEN SATURATION: 91 % | DIASTOLIC BLOOD PRESSURE: 62 MMHG | TEMPERATURE: 98.1 F | BODY MASS INDEX: 32.07 KG/M2 | RESPIRATION RATE: 14 BRPM | HEART RATE: 63 BPM | HEIGHT: 73 IN

## 2021-05-04 DIAGNOSIS — I21.4 ACUTE MYOCARDIAL INFARCTION, SUBENDOCARDIAL INFARCTION, INITIAL EPISODE OF CARE (HCC): Primary | ICD-10-CM

## 2021-05-04 LAB
ANION GAP SERPL CALC-SCNC: 6 MMOL/L (ref 5–15)
APTT PPP: 27.9 SEC (ref 22.1–31)
BNP SERPL-MCNC: 1472 PG/ML
BUN SERPL-MCNC: 22 MG/DL (ref 6–20)
BUN/CREAT SERPL: 31 (ref 12–20)
CALCIUM SERPL-MCNC: 9 MG/DL (ref 8.5–10.1)
CHLORIDE SERPL-SCNC: 107 MMOL/L (ref 97–108)
CO2 SERPL-SCNC: 27 MMOL/L (ref 21–32)
CREAT SERPL-MCNC: 0.72 MG/DL (ref 0.7–1.3)
ECHO AO ROOT DIAM: 2.94 CM
ECHO AV AREA PEAK VELOCITY: 2.58 CM2
ECHO AV AREA/BSA PEAK VELOCITY: 1.1 CM2/M2
ECHO AV PEAK GRADIENT: 11.32 MMHG
ECHO AV PEAK VELOCITY: 168.22 CM/S
ECHO LA AREA 4C: 26.24 CM2
ECHO LA MAJOR AXIS: 5.18 CM
ECHO LA MINOR AXIS: 2.22 CM
ECHO LA VOL 2C: 87.39 ML (ref 18–58)
ECHO LA VOL 4C: 80.46 ML (ref 18–58)
ECHO LA VOL BP: 94.75 ML (ref 18–58)
ECHO LA VOL/BSA BIPLANE: 40.61 ML/M2 (ref 16–28)
ECHO LA VOLUME INDEX A2C: 37.45 ML/M2 (ref 16–28)
ECHO LA VOLUME INDEX A4C: 34.48 ML/M2 (ref 16–28)
ECHO LV E' LATERAL VELOCITY: 7.89 CM/S
ECHO LV E' SEPTAL VELOCITY: 8.33 CM/S
ECHO LV EDV A2C: 150.74 ML
ECHO LV EDV A4C: 122.35 ML
ECHO LV EDV BP: 135.75 ML (ref 67–155)
ECHO LV EDV INDEX A4C: 52.4 ML/M2
ECHO LV EDV INDEX BP: 58.2 ML/M2
ECHO LV EDV NDEX A2C: 64.6 ML/M2
ECHO LV EJECTION FRACTION A2C: 44 PERCENT
ECHO LV EJECTION FRACTION A4C: 46 PERCENT
ECHO LV EJECTION FRACTION BIPLANE: 43.6 PERCENT (ref 55–100)
ECHO LV ESV A2C: 84.09 ML
ECHO LV ESV A4C: 66.23 ML
ECHO LV ESV BP: 76.51 ML (ref 22–58)
ECHO LV ESV INDEX A2C: 36 ML/M2
ECHO LV ESV INDEX A4C: 28.4 ML/M2
ECHO LV ESV INDEX BP: 32.8 ML/M2
ECHO LV INTERNAL DIMENSION DIASTOLIC: 6.34 CM (ref 4.2–5.9)
ECHO LV INTERNAL DIMENSION DIASTOLIC: 6.57 CM (ref 4.2–5.9)
ECHO LV INTERNAL DIMENSION SYSTOLIC: 5.31 CM
ECHO LV INTERNAL DIMENSION SYSTOLIC: 5.46 CM
ECHO LV IVSD: 0.98 CM (ref 0.6–1)
ECHO LV POSTERIOR WALL DIASTOLIC: 1.15 CM (ref 0.6–1)
ECHO LVOT DIAM: 2.1 CM
ECHO LVOT PEAK GRADIENT: 6.32 MMHG
ECHO LVOT PEAK VELOCITY: 125.71 CM/S
ECHO MV A VELOCITY: 66.11 CM/S
ECHO MV AREA PHT: 3 CM2
ECHO MV E DECELERATION TIME (DT): 252.97 MS
ECHO MV E VELOCITY: 115.36 CM/S
ECHO MV E/A RATIO: 1.74
ECHO MV E/E' LATERAL: 14.62
ECHO MV E/E' RATIO (AVERAGED): 14.23
ECHO MV E/E' SEPTAL: 13.85
ECHO MV EROA PISA: 0.17 CM2
ECHO MV PRESSURE HALF TIME (PHT): 73.36 MS
ECHO MV REGURGITANT RADIUS PISA: 0.58 CM
ECHO MV REGURGITANT VOLUME: 25.77 ML
ECHO MV REGURGITANT VTIA: 148.84 CM
ECHO PV MAX VELOCITY: 107.2 CM/S
ECHO PV PEAK INSTANTANEOUS GRADIENT SYSTOLIC: 4.6 MMHG
ECHO RV INTERNAL DIMENSION: 5.23 CM
ECHO RV TAPSE: 2.64 CM (ref 1.5–2)
ECHO TV REGURGITANT MAX VELOCITY: 253.73 CM/S
ECHO TV REGURGITANT PEAK GRADIENT: 25.75 MMHG
ERYTHROCYTE [DISTWIDTH] IN BLOOD BY AUTOMATED COUNT: 13.4 % (ref 11.5–14.5)
GLUCOSE SERPL-MCNC: 59 MG/DL (ref 65–100)
HCT VFR BLD AUTO: 43.4 % (ref 36.6–50.3)
HGB BLD-MCNC: 14.3 G/DL (ref 12.1–17)
MCH RBC QN AUTO: 30.1 PG (ref 26–34)
MCHC RBC AUTO-ENTMCNC: 32.9 G/DL (ref 30–36.5)
MCV RBC AUTO: 91.4 FL (ref 80–99)
MR PISA PV: 490.58 CM/S
NRBC # BLD: 0 K/UL (ref 0–0.01)
NRBC BLD-RTO: 0 PER 100 WBC
PLATELET # BLD AUTO: 195 K/UL (ref 150–400)
PMV BLD AUTO: 10.1 FL (ref 8.9–12.9)
POTASSIUM SERPL-SCNC: 3.2 MMOL/L (ref 3.5–5.1)
RBC # BLD AUTO: 4.75 M/UL (ref 4.1–5.7)
SODIUM SERPL-SCNC: 140 MMOL/L (ref 136–145)
THERAPEUTIC RANGE,PTTT: NORMAL SECS (ref 58–77)
TROPONIN I SERPL-MCNC: 13.9 NG/ML
WBC # BLD AUTO: 10.8 K/UL (ref 4.1–11.1)

## 2021-05-04 PROCEDURE — C8929 TTE W OR WO FOL WCON,DOPPLER: HCPCS

## 2021-05-04 PROCEDURE — 80048 BASIC METABOLIC PNL TOTAL CA: CPT

## 2021-05-04 PROCEDURE — 74011250636 HC RX REV CODE- 250/636: Performed by: INTERNAL MEDICINE

## 2021-05-04 PROCEDURE — 85730 THROMBOPLASTIN TIME PARTIAL: CPT

## 2021-05-04 PROCEDURE — 84484 ASSAY OF TROPONIN QUANT: CPT

## 2021-05-04 PROCEDURE — 36415 COLL VENOUS BLD VENIPUNCTURE: CPT

## 2021-05-04 PROCEDURE — 85027 COMPLETE CBC AUTOMATED: CPT

## 2021-05-04 PROCEDURE — 74011250637 HC RX REV CODE- 250/637: Performed by: INTERNAL MEDICINE

## 2021-05-04 PROCEDURE — 83880 ASSAY OF NATRIURETIC PEPTIDE: CPT

## 2021-05-04 PROCEDURE — 77010033678 HC OXYGEN DAILY

## 2021-05-04 RX ORDER — EVOLOCUMAB 420 MG/3.5
10 KIT SUBCUTANEOUS
Qty: 2 CARTRIDGE | Refills: 5 | Status: SHIPPED | OUTPATIENT
Start: 2021-05-04

## 2021-05-04 RX ORDER — FUROSEMIDE 40 MG/1
40 TABLET ORAL 2 TIMES DAILY
Qty: 180 TAB | Refills: 3 | Status: ON HOLD | OUTPATIENT
Start: 2021-05-04 | End: 2021-05-14 | Stop reason: SDUPTHER

## 2021-05-04 RX ORDER — RANOLAZINE 500 MG/1
500 TABLET, EXTENDED RELEASE ORAL 2 TIMES DAILY
Qty: 60 TAB | Refills: 5 | Status: SHIPPED | OUTPATIENT
Start: 2021-05-04

## 2021-05-04 RX ADMIN — TORSEMIDE 40 MG: 20 TABLET ORAL at 08:39

## 2021-05-04 RX ADMIN — PERFLUTREN 2 ML: 6.52 INJECTION, SUSPENSION INTRAVENOUS at 09:43

## 2021-05-04 RX ADMIN — Medication 10 ML: at 06:25

## 2021-05-04 RX ADMIN — GLIPIZIDE 10 MG: 5 TABLET ORAL at 08:39

## 2021-05-04 RX ADMIN — DULOXETINE HYDROCHLORIDE 30 MG: 30 CAPSULE, DELAYED RELEASE ORAL at 08:39

## 2021-05-04 RX ADMIN — SACUBITRIL AND VALSARTAN 1 TABLET: 24; 26 TABLET, FILM COATED ORAL at 08:39

## 2021-05-04 RX ADMIN — ASPIRIN 81 MG: 81 TABLET, COATED ORAL at 08:39

## 2021-05-04 RX ADMIN — ENOXAPARIN SODIUM 111 MG: 120 INJECTION SUBCUTANEOUS at 04:00

## 2021-05-04 NOTE — PROGRESS NOTES
visit for Advance Medical Directive (AMD) consult. Pt was in bed and pt's wife Ada Gaming present. Pt named his wife, Ada Gaming as his primary decision maker and pt's son, Rosita Hernandez as his secondary.  explained documents and answered questions.  entered information in chart and paper copy in chart to be scanned.  returned the original and copies to pt and wife. Let them know of  support and availability. Please contact [phone] [facility] for further support.      [address] Heaven Mendez, [facility]   [phone]

## 2021-05-04 NOTE — PROGRESS NOTES
Hospital follow-up PCP transitional care appointment has been scheduled with  Bita Cobb NP for Friday, 5/7/21 at 1:40 p.m. Pending patient discharge.   Kristian Witt, Care Management Specialist.

## 2021-05-04 NOTE — DISCHARGE INSTRUCTIONS
Patient Education        Heart Attack: Care Instructions  Overview     A heart attack is an event that occurs when part of the heart muscle does not get enough blood and oxygen. This part of the heart starts to die. A heart attack is also called a myocardial infarction, or MI. A heart attack most often happens because blood flow through one or more of the coronary arteries is blocked. This blockage is usually caused by a blood clot that forms when plaque in the artery breaks open. After a heart attack, you may be worried about your future. Over the next several weeks, your heart will start to heal. Though it can be hard to break old habits, you can reduce your risk of having another heart attack. You can do this by making some lifestyle changes and by taking medicines. Follow-up care is a key part of your treatment and safety. Be sure to make and go to all appointments, and call your doctor if you are having problems. It's also a good idea to know your test results and keep a list of the medicines you take. How can you care for yourself at home? Activity    · Until your doctor says it is okay, do not do strenuous exercise. And do not lift, pull, or push anything heavy. Ask your doctor what types of activities are safe for you.     · If your doctor has not set you up with a cardiac rehabilitation (rehab) program, talk to him or her about whether that is right for you. Cardiac rehab includes supervised exercise. It also includes help with diet and lifestyle changes and emotional support. It may reduce your risk of future heart problems.     · Increase your activities slowly. Take short rest breaks when you get tired.     · If your doctor recommends it, get more exercise. Walking is a good choice. Bit by bit, increase the amount you walk every day. Try for at least 30 minutes on most days of the week. You also may want to swim, bike, or do other activities.  Talk with your doctor before you start an exercise program to make sure it is safe for you.     · Ask your doctor when you can drive, go back to work, and do other daily activities again.     · You can have sex as soon as you feel ready for it. Often this means when you can easily walk around or climb stairs. Talk with your doctor if you have any concerns. If you are taking nitroglycerin, do not take erection-enhancing medicine such as sildenafil (Viagra), tadalafil (Cialis), or vardenafil (Levitra) . Lifestyle changes    · Do not smoke. Smoking increases your risk of another heart attack. If you need help quitting, talk to your doctor about stop-smoking programs and medicines. These can increase your chances of quitting for good.     · Eat a heart-healthy diet that is low in saturated fat and salt, and is full of fruits, vegetables and whole-grains. You may get more details about how to eat healthy. But these tips can help you get started.     · Stay at a healthy weight, or lose weight if you need to. Medicines    · Be safe with medicines. Take your medicines exactly as prescribed. Call your doctor if you think you are having a problem with your medicine. You will get more details on the specific medicines your doctor prescribes. Do not stop taking your medicine unless your doctor tells you to. Not taking your medicine might raise your risk of having another heart attack.     · You may need several medicines to help lower your risk of another heart attack. These include:  ? Blood pressure medicines such as angiotensin-converting enzyme (ACE) inhibitors, ARBs (angiotensin II receptor blockers), and beta-blockers. ? Cholesterol medicine called statins. ? Aspirin and other blood thinners. These prevent blood clots that can cause a heart attack.     · If your doctor has given you nitroglycerin, keep it with you at all times. If you have angina symptoms, such as chest pain or pressure, sit down and rest. Take the first dose of nitroglycerin as directed.  If symptoms get worse or are not getting better within 5 minutes, call 911 right away. Stay on the phone. The emergency  will tell you what to do.     · Do not take any over-the-counter medicines, vitamins, or herbal products without talking to your doctor first.   Staying healthy    · Manage other health conditions such as high blood pressure and diabetes.     · Avoid colds and flu. Get a pneumococcal vaccine shot. If you have had one before, ask your doctor whether you need another dose. Get the flu vaccine every year. If you must be around people with colds or flu, wash your hands often.     · Be sure to tell your doctor about any angina symptoms you have had, even if they went away. Pay attention to your angina symptoms. Know what is typical for you and learn how to control it. Know when to call for help.     · Talk to your family, friends, or a counselor about your feelings. It is normal to feel frightened, angry, hopeless, helpless, and even guilty. Talking openly about bad feelings can help you cope. If you have symptoms of depression, talk to your doctor. When should you call for help? Call 911 anytime you think you may need emergency care. For example, call if:    · You have symptoms of a heart attack. These may include:  ? Chest pain or pressure, or a strange feeling in the chest.  ? Sweating. ? Shortness of breath. ? Nausea or vomiting. ? Pain, pressure, or a strange feeling in the back, neck, jaw, or upper belly or in one or both shoulders or arms. ? Lightheadedness or sudden weakness. ? A fast or irregular heartbeat. After you call 911, the  may tell you to chew 1 adult-strength or 2 to 4 low-dose aspirin. Wait for an ambulance. Do not try to drive yourself.     · You have angina symptoms (such as chest pain or pressure) that do not go away with rest or are not getting better within 5 minutes after you take a dose of nitroglycerin.     · You passed out (lost consciousness).      · You feel like you are having another heart attack. Call your doctor now or seek immediate medical care if:    · You are having angina symptoms, such as chest pain or pressure, more often than usual, or the symptoms are different or worse than usual.     · You have new or increased shortness of breath.     · You are dizzy or lightheaded, or you feel like you may faint. Watch closely for changes in your health, and be sure to contact your doctor if you have any problems. Where can you learn more? Go to http://www.gray.com/  Enter H564 in the search box to learn more about \"Heart Attack: Care Instructions. \"  Current as of: August 31, 2020               Content Version: 12.8  © 2006-2021 [employer]. Care instructions adapted under license by [employer] (which disclaims liability or warranty for this information). If you have questions about a medical condition or this instruction, always ask your healthcare professional. [place] [address] any warranty or liability for your use of this information. [facility]/ED Visit Follow-Up Instructions/Information    You may have an in home follow up visit set up with [facility] or may wish to contact [facility] to set-up a visit:    What are we? [facility] is an in-home urgent care service staffed with emergency trained medical teams. We come to your home in a vehicle stocked with medical supplies and technology. An ER physician is always available if needed. When? As a part of your hospital follow-up, an appointment has been/ or can be set up for us to come see you. Usually, this will be 24-72 hours after you leave the hospital or as needed. [employer] is open 7am-9pm, 7 days a week, 365 days a year, including holidays. Why?   We know that you cannot always get to your doctor after being in the hospital and that your doctor is not always available when you need them. Once your workup is complete, we'll call in your prescriptions, update your family doctor, and handle billing with your insurance so you can focus on feeling better, faster without leaving home. How much? We accept most major health insurance plans, including Medicaid, Medicare, and Medicare Advantage [place], [facility] [employer], [employer], and [employer]. We also accept: credit, debit, health savings account (HSA), health reimbursement account (HRA) and flexible spending account (FSA) payments. [employer]'s prices compare to conventional urgent care facilities, but we bring the care to you. How to reach us? Getting care is easy- use our mobile cassandra ([employer]), website ([email or web address]) or call us [phone].

## 2021-05-04 NOTE — PROGRESS NOTES
Problem: Falls - Risk of  Goal: *Absence of Falls  Description: Document Shannacharisse Elidia Fall Risk and appropriate interventions in the flowsheet.   Outcome: Progressing Towards Goal  Note: Fall Risk Interventions:            Medication Interventions: Evaluate medications/consider consulting pharmacy

## 2021-05-04 NOTE — CARDIO/PULMONARY
Cardiac Rehab: MI education folder, with catheterization brochure, to bedside of Selina Roldan. He is well know to our team.                                              Educated using teach back method. Reviewed MI diagnosis definition and purpose of intervention. Discussed risk factors for CAD to include the following: family history, elevated BMI, hyperlipidemia, hypertension, diabetes, stress, and smoking. Discussed Heart Healthy/Low Sodium (2000 mg) diet. Reviewed the importance of medication compliance, the purpose of effient, and potential side effects. Discussed follow up appointments with cardiologist, signs and symptoms of angina, and what to report to physician after discharge. Emphasized the value of cardiac rehab. Discussed Cardiac Rehab Program format, benefits, and encouraged enrollment to assist with risk modification and management. Selina Roldan is working from home and asked for a follow up call. [facility] contact is on his AVS and a referral was placed for OP program.  .    Selina Roldan verbalized understanding with questions answered.   Niecy Luciano RN

## 2021-05-04 NOTE — DISCHARGE SUMMARY
Cardiology Discharge Summary     Patient ID:  Nehemiah Ying  [id number]  87 y.o.  1960    Allergies: Tape [adhesive]    Admit Date: 5/3/2021    Discharge Date: 5/4/2021     Admitting Physician: Beth Davis MD     Discharge Physician: Emelia Valenzuela MD    * Admission Diagnoses: NSTEMI (non-ST elevated myocardial infarction) [facility]) [I21.4]    * Discharge Diagnoses:   Hospital Problems as of 5/4/2021 Never Reviewed          Codes Class Noted - Resolved POA    NSTEMI (non-ST elevated myocardial infarction) [facility]) ICD-10-CM: I21.4  ICD-9-CM: 410.70  5/18/2018 - Present Unknown              * Discharge Condition: good and improved    * Cardiology Procedures this Admission:  Diagnostic left heart catheterization      [facility] Course:   Mr Aren Wood is a 58 yo M with a h/o type II diabetes and CAD with prior CABG, who presented to the ER with symptoms of left sided chest pain radiating to the left arm. This was typical of prior anginal symptoms. His symptoms began over the weekend and intensified Monday, leading him to seek medical attention. Initial EKG did not show any clear ST changes, but his initial troponin was positive at 17.8. Due to ongoing chest pain, he was taken urgently to the cath lab. Dr Chacho Martin performed his cardiac cath since I was tied up in another procedure at the time. Cath findings showed that his previously stented SVG to the RCA was 100% occluded with thrombus and this appeared to be the culprit lesion. His native RCA is a  which fills via left to right collaterals. He was admitted and placed on lovenox. Pain improved and his troponin has been down trending. We plan to manage him medically and will continue ASA and Effient, along with metoprolol and the addition of Ranexa. His BP is on the low side, so we may not have much room to add long acting nitrates / amlodipine. I plan to see him back in follow-up in about 2 weeks and we can continue to adjust his medications.  If anginal symptoms persist despite medications, we can consider  intervention on his native RCA. Consults: None    Discharge Exam:     Visit Vitals  /61 (BP 1 Location: Right upper arm, BP Patient Position: At rest;Supine)   Pulse 66   Temp 98.4 °F (36.9 °C)   Resp 16   Ht 185.4 cm (73\")   Wt 110 kg (242 lb 8.1 oz)   SpO2 92%   BMI 31.99 kg/m²     General Appearance:  Well developed, well nourished,alert and oriented x 3, and individual in no acute distress. Ears/Nose/Mouth/Throat:   Hearing grossly normal.         Neck: Supple. Chest:   Lungs clear to auscultation bilaterally. Cardiovascular:  Regular rate and rhythm, S1, S2 normal, no murmur. Abdomen:   Soft, non-tender, bowel sounds are active. Extremities: No edema bilaterally. Skin: Warm and dry. * Disposition: Home    Discharge Medications:   Current Discharge Medication List      START taking these medications    Details   ranolazine ER (Ranexa) 500 mg SR tablet Take 1 Tab by mouth two (2) times a day. Qty: 60 Tab, Refills: 5         CONTINUE these medications which have CHANGED    Details   evolocumab (Repatha Pushtronex) 420 mg/3.5 mL Injt 10 mg by SubCUTAneous route every thirty (30) days. Every 2 weeks on Sunday; next dose due 5/20/18  Qty: 2 Cartridge, Refills: 5      furosemide (Lasix) 40 mg tablet Take 1 Tab by mouth two (2) times a day. Qty: 180 Tab, Refills: 3         CONTINUE these medications which have NOT CHANGED    Details   cholecalciferol, vitamin D3, (VITAMIN D3) 2,000 unit tab Take 2,000 Units by mouth daily. multivits,Stress Formula-Zinc tablet Take 1 Tab by mouth daily. sacubitril-valsartan (ENTRESTO) 24 mg/26 mg tablet Take 1 Tab by mouth every twelve (12) hours. Qty: 60 Tab, Refills: 11      insulin glargine (LANTUS,BASAGLAR) 100 unit/mL (3 mL) inpn 85 Units by SubCUTAneous route nightly. dapagliflozin (FARXIGA) 10 mg tab tablet Take 10 mg by mouth daily.       aspirin delayed-release 81 mg tablet Take 1 Tab by mouth daily. Qty: 30 Tab, Refills: 11      prasugrel (EFFIENT) 10 mg tablet Take 1 Tab by mouth nightly. Qty: 30 Tab, Refills: 11      nebivolol (BYSTOLIC) 5 mg tablet Take 5 mg by mouth every evening. DULoxetine (CYMBALTA) 30 mg capsule Take 30 mg by mouth daily. glipiZIDE SR (GLUCOTROL XL) 5 mg CR tablet Take 10 mg by mouth Before breakfast and dinner. * Follow-up Care/Patient Instructions:    Activity:Activity as tolerated  Diet: Diabetic Diet  Wound Care: None needed    Follow-up Information     Follow up With Specialties Details Why Contact Info    Chalino Aly NP Nurse Practitioner   [address] [place] [address]  [phone]      Viri Isbell MD Cardiology Schedule an appointment as soon as possible for a visit in 2 weeks [facility] follow-up [address]            Signed:  Delbert Perez MD  5/4/2021  7:03 AM

## 2021-05-04 NOTE — PROGRESS NOTES
Bedside and Verbal shift change report given to [facility] (oncoming nurse) by Donna Ha RN(offgoing nurse). Report included the following information SBAR, Kardex, ED Summary, Intake/Output, MAR, Recent Results and Cardiac Rhythm NSR.

## 2021-05-04 NOTE — PROGRESS NOTES
Reason for Admission:  Chest pain                     RUR Score:         10%            Plan for utilizing home health:      No needs    PCP: First and Last name:  Dr. Deb Bowden   Name of Practice:    Are you a current patient: Yes/No: Yes   Approximate date of last visit:    Can you participate in a virtual visit with your PCP:                     Current Advanced Directive/Advance Care Plan: Full Code      Healthcare Decision Maker:  Wife-Dagmar [phone]  Click here to complete [address] including selection of the Healthcare Decision Maker Relationship (ie \"Primary\")                             Transition of Care Plan:         CM met with patient and wife to inform of CM role and to assess needs. Patient lives at home with his wife and is independent. He uses a home cpap and home oxygen concentrator (2L at night). Patient currently receiving echo and will also need a 6MWT before discharge. CM consulted pastoral care for completion of AMD at the request of patient and wife. CM also received insurance info and provided copy to patient access to add to patient's info. There are no other CM needs identified at this time.     Winnie Leonard, MS

## 2021-05-04 NOTE — ACP (ADVANCE CARE PLANNING)
visit for Advance Medical Directive (AMD) consult. Pt was in bed and pt's wife Jason Vo present. Pt named his wife, Jason Vo as his primary decision maker and pt's son, Farhad Irwin as his secondary.  explained documents and answered questions.  entered information in chart and paper copy in chart to be scanned.  returned the original and copies to pt and wife. Let them know of  support and availability. Please contact [phone] [facility] for further support.      [address] Heaven Mendez, [facility]   [phone])

## 2021-05-05 ENCOUNTER — PATIENT OUTREACH (OUTPATIENT)
Dept: CASE MANAGEMENT | Age: 61
End: 2021-05-05

## 2021-05-05 ENCOUNTER — TELEPHONE (OUTPATIENT)
Dept: CARDIAC REHAB | Age: 61
End: 2021-05-05

## 2021-05-05 NOTE — PROGRESS NOTES
Patient contacted regarding recent discharge and COVID-19 risk. Patient was tested prior to  Discussed COVID-19 related testing which was available at this time. Test results were negative. Outreach made within 2 business days of discharge: Yes    Care Transition Nurse/ Ambulatory Care Manager/ LPN Care Coordinator contacted the patient by telephone to perform post discharge assessment. Verified name and  with patient as identifiers. Patient has following risk factors of: heart failure. CTN/ACM/LPN reviewed discharge instructions, medical action plan and red flags related to discharge diagnosis. Reviewed and educated them on any new and changed medications related to discharge diagnosis. Advised obtaining a 90-day supply of all daily and as-needed medications. Advance Care Planning:   Does patient have an Advance Directive: not on file    Education provided regarding infection prevention, and signs and symptoms of COVID-19 and when to seek medical attention with patient who verbalized understanding. Discussed exposure protocols and quarantine from [address] you at higher risk for severe illness  and given an opportunity for questions and concerns. The patient agrees to contact the COVID-19 hotline [phone] or PCP office for questions related to their healthcare. CTN/ACM/LPN provided contact information for future reference. From CDC: Are you at higher risk for severe illness?  Wash your hands often.  Avoid close contact (6 feet, which is about two arm lengths) with people who are sick.  Put distance between yourself and other people if COVID-19 is spreading in your community.  Clean and disinfect frequently touched surfaces.  Avoid all cruise travel and non-essential air travel.  Call your healthcare professional if you have concerns about COVID-19 and your underlying condition or if you are sick.     For more information on steps you can take to protect yourself, see CDC's How to Protect Yourself

## 2021-05-05 NOTE — TELEPHONE ENCOUNTER
5/5/2021 Cardiac Rehab: Called Mr. Puentes Done  to discuss participation in the Cardiac Rehab Program following a NSTEMI on 5/3/2021. Left voicemail message and will follow there week of 5/6/2021.  Juan Lacey RN

## 2021-05-06 ENCOUNTER — TELEPHONE (OUTPATIENT)
Dept: CARDIAC REHAB | Age: 61
End: 2021-05-06

## 2021-05-06 NOTE — TELEPHONE ENCOUNTER
5/6/2021 Cardiac Rehab: Called and spoke with James Abdullahi. Discussed cath, elevated troponins, and recent events. He still needs to make and appointment with Dr. Olivia Olmedo. James Abdullahi has done cardiac rehab \"a number of times\" at [facility] and [facility], however, he is \"unsure\" about re-enrollment stating \"I am not really sure it did any good\". He will call us if he decides to enroll. He has our number. Will await his call. Carli Marcum RN    5/5/2021 Cardiac Rehab: Called Mr. James Abdullahi  to discuss participation in the Cardiac Rehab Program following a NSTEMI on 5/3/2021. Left voicemail message and will follow there week of 5/6/2021.  Carli Marcum RN

## 2021-05-07 ENCOUNTER — HOSPITAL ENCOUNTER (INPATIENT)
Age: 61
LOS: 2 days | Discharge: HOME | DRG: 246 | End: 2021-05-09
Attending: EMERGENCY MEDICINE | Admitting: INTERNAL MEDICINE
Payer: COMMERCIAL

## 2021-05-07 ENCOUNTER — APPOINTMENT (OUTPATIENT)
Dept: GENERAL RADIOLOGY | Age: 61
DRG: 246 | End: 2021-05-07
Attending: EMERGENCY MEDICINE
Payer: COMMERCIAL

## 2021-05-07 DIAGNOSIS — R07.9 CHEST PAIN, UNSPECIFIED TYPE: Primary | ICD-10-CM

## 2021-05-07 DIAGNOSIS — I21.3 ST ELEVATION (STEMI) MYOCARDIAL INFARCTION (HCC): ICD-10-CM

## 2021-05-07 LAB
ALBUMIN SERPL-MCNC: 3.8 G/DL (ref 3.5–5)
ALBUMIN/GLOB SERPL: 1 {RATIO} (ref 1.1–2.2)
ALP SERPL-CCNC: 100 U/L (ref 45–117)
ALT SERPL-CCNC: 32 U/L (ref 12–78)
AMPHET UR QL SCN: NEGATIVE
ANION GAP SERPL CALC-SCNC: 7 MMOL/L (ref 5–15)
AST SERPL-CCNC: 21 U/L (ref 15–37)
BARBITURATES UR QL SCN: NEGATIVE
BASOPHILS # BLD: 0 K/UL (ref 0–0.1)
BASOPHILS NFR BLD: 0 % (ref 0–1)
BENZODIAZ UR QL: NEGATIVE
BILIRUB SERPL-MCNC: 0.4 MG/DL (ref 0.2–1)
BUN SERPL-MCNC: 17 MG/DL (ref 6–20)
BUN/CREAT SERPL: 18 (ref 12–20)
CALCIUM SERPL-MCNC: 9 MG/DL (ref 8.5–10.1)
CANNABINOIDS UR QL SCN: NEGATIVE
CHLORIDE SERPL-SCNC: 104 MMOL/L (ref 97–108)
CO2 SERPL-SCNC: 28 MMOL/L (ref 21–32)
COCAINE UR QL SCN: NEGATIVE
COMMENT, HOLDF: NORMAL
CREAT SERPL-MCNC: 0.93 MG/DL (ref 0.7–1.3)
DIFFERENTIAL METHOD BLD: NORMAL
DRUG SCRN COMMENT,DRGCM: NORMAL
EOSINOPHIL # BLD: 0.3 K/UL (ref 0–0.4)
EOSINOPHIL NFR BLD: 4 % (ref 0–7)
ERYTHROCYTE [DISTWIDTH] IN BLOOD BY AUTOMATED COUNT: 13 % (ref 11.5–14.5)
GLOBULIN SER CALC-MCNC: 3.8 G/DL (ref 2–4)
GLUCOSE SERPL-MCNC: 201 MG/DL (ref 65–100)
HCT VFR BLD AUTO: 46.9 % (ref 36.6–50.3)
HGB BLD-MCNC: 15.9 G/DL (ref 12.1–17)
IMM GRANULOCYTES # BLD AUTO: 0 K/UL (ref 0–0.04)
IMM GRANULOCYTES NFR BLD AUTO: 0 % (ref 0–0.5)
LYMPHOCYTES # BLD: 3.4 K/UL (ref 0.8–3.5)
LYMPHOCYTES NFR BLD: 38 % (ref 12–49)
MCH RBC QN AUTO: 30.1 PG (ref 26–34)
MCHC RBC AUTO-ENTMCNC: 33.9 G/DL (ref 30–36.5)
MCV RBC AUTO: 88.8 FL (ref 80–99)
METHADONE UR QL: NEGATIVE
MONOCYTES # BLD: 0.6 K/UL (ref 0–1)
MONOCYTES NFR BLD: 6 % (ref 5–13)
NEUTS SEG # BLD: 4.6 K/UL (ref 1.8–8)
NEUTS SEG NFR BLD: 52 % (ref 32–75)
NRBC # BLD: 0 K/UL (ref 0–0.01)
NRBC BLD-RTO: 0 PER 100 WBC
OPIATES UR QL: NEGATIVE
PCP UR QL: NEGATIVE
PLATELET # BLD AUTO: 281 K/UL (ref 150–400)
PMV BLD AUTO: 10.1 FL (ref 8.9–12.9)
POTASSIUM SERPL-SCNC: 3.2 MMOL/L (ref 3.5–5.1)
PROT SERPL-MCNC: 7.6 G/DL (ref 6.4–8.2)
RBC # BLD AUTO: 5.28 M/UL (ref 4.1–5.7)
SAMPLES BEING HELD,HOLD: NORMAL
SODIUM SERPL-SCNC: 139 MMOL/L (ref 136–145)
TROPONIN I SERPL-MCNC: 1.99 NG/ML
WBC # BLD AUTO: 8.9 K/UL (ref 4.1–11.1)

## 2021-05-07 PROCEDURE — 4A023N7 MEASUREMENT OF CARDIAC SAMPLING AND PRESSURE, LEFT HEART, PERCUTANEOUS APPROACH: ICD-10-PCS | Performed by: INTERNAL MEDICINE

## 2021-05-07 PROCEDURE — 77030013744: Performed by: INTERNAL MEDICINE

## 2021-05-07 PROCEDURE — C1769 GUIDE WIRE: HCPCS | Performed by: INTERNAL MEDICINE

## 2021-05-07 PROCEDURE — C1892 INTRO/SHEATH,FIXED,PEEL-AWAY: HCPCS | Performed by: INTERNAL MEDICINE

## 2021-05-07 PROCEDURE — 74011000250 HC RX REV CODE- 250: Performed by: INTERNAL MEDICINE

## 2021-05-07 PROCEDURE — 77030004538 HC CATH ANGI DX MP BSC -A: Performed by: INTERNAL MEDICINE

## 2021-05-07 PROCEDURE — 96374 THER/PROPH/DIAG INJ IV PUSH: CPT

## 2021-05-07 PROCEDURE — 85025 COMPLETE CBC W/AUTO DIFF WBC: CPT

## 2021-05-07 PROCEDURE — 84484 ASSAY OF TROPONIN QUANT: CPT

## 2021-05-07 PROCEDURE — 99152 MOD SED SAME PHYS/QHP 5/>YRS: CPT | Performed by: INTERNAL MEDICINE

## 2021-05-07 PROCEDURE — 80307 DRUG TEST PRSMV CHEM ANLYZR: CPT

## 2021-05-07 PROCEDURE — 74011250637 HC RX REV CODE- 250/637: Performed by: EMERGENCY MEDICINE

## 2021-05-07 PROCEDURE — C1887 CATHETER, GUIDING: HCPCS | Performed by: INTERNAL MEDICINE

## 2021-05-07 PROCEDURE — 77030037392 HC CANN PUMP/FLTR INDIGO PENU -E: Performed by: INTERNAL MEDICINE

## 2021-05-07 PROCEDURE — 02C03ZZ EXTIRPATION OF MATTER FROM CORONARY ARTERY, ONE ARTERY, PERCUTANEOUS APPROACH: ICD-10-PCS | Performed by: INTERNAL MEDICINE

## 2021-05-07 PROCEDURE — C1725 CATH, TRANSLUMIN NON-LASER: HCPCS | Performed by: INTERNAL MEDICINE

## 2021-05-07 PROCEDURE — 36415 COLL VENOUS BLD VENIPUNCTURE: CPT

## 2021-05-07 PROCEDURE — 74011000258 HC RX REV CODE- 258: Performed by: INTERNAL MEDICINE

## 2021-05-07 PROCEDURE — 99285 EMERGENCY DEPT VISIT HI MDM: CPT

## 2021-05-07 PROCEDURE — 77030039046 HC PAD DEFIB RADIOTRNSPNT CNMD -B: Performed by: INTERNAL MEDICINE

## 2021-05-07 PROCEDURE — 71046 X-RAY EXAM CHEST 2 VIEWS: CPT

## 2021-05-07 PROCEDURE — 74011000250 HC RX REV CODE- 250: Performed by: EMERGENCY MEDICINE

## 2021-05-07 PROCEDURE — B41F1ZZ FLUOROSCOPY OF RIGHT LOWER EXTREMITY ARTERIES USING LOW OSMOLAR CONTRAST: ICD-10-PCS | Performed by: INTERNAL MEDICINE

## 2021-05-07 PROCEDURE — 80053 COMPREHEN METABOLIC PANEL: CPT

## 2021-05-07 PROCEDURE — 65620000000 HC RM CCU GENERAL

## 2021-05-07 PROCEDURE — 74011250637 HC RX REV CODE- 250/637: Performed by: INTERNAL MEDICINE

## 2021-05-07 PROCEDURE — 74011250636 HC RX REV CODE- 250/636: Performed by: INTERNAL MEDICINE

## 2021-05-07 PROCEDURE — 93005 ELECTROCARDIOGRAM TRACING: CPT

## 2021-05-07 PROCEDURE — 74011250636 HC RX REV CODE- 250/636: Performed by: EMERGENCY MEDICINE

## 2021-05-07 PROCEDURE — C1874 STENT, COATED/COV W/DEL SYS: HCPCS | Performed by: INTERNAL MEDICINE

## 2021-05-07 PROCEDURE — 027037Z DILATION OF CORONARY ARTERY, ONE ARTERY WITH FOUR OR MORE DRUG-ELUTING INTRALUMINAL DEVICES, PERCUTANEOUS APPROACH: ICD-10-PCS | Performed by: INTERNAL MEDICINE

## 2021-05-07 PROCEDURE — 93459 L HRT ART/GRFT ANGIO: CPT | Performed by: INTERNAL MEDICINE

## 2021-05-07 PROCEDURE — C1757 CATH, THROMBECTOMY/EMBOLECT: HCPCS | Performed by: INTERNAL MEDICINE

## 2021-05-07 PROCEDURE — 99153 MOD SED SAME PHYS/QHP EA: CPT | Performed by: INTERNAL MEDICINE

## 2021-05-07 PROCEDURE — B2111ZZ FLUOROSCOPY OF MULTIPLE CORONARY ARTERIES USING LOW OSMOLAR CONTRAST: ICD-10-PCS | Performed by: INTERNAL MEDICINE

## 2021-05-07 PROCEDURE — 77030013715 HC INFL SYS MRTM -B: Performed by: INTERNAL MEDICINE

## 2021-05-07 PROCEDURE — C1894 INTRO/SHEATH, NON-LASER: HCPCS | Performed by: INTERNAL MEDICINE

## 2021-05-07 PROCEDURE — B2131ZZ FLUOROSCOPY OF MULTIPLE CORONARY ARTERY BYPASS GRAFTS USING LOW OSMOLAR CONTRAST: ICD-10-PCS | Performed by: INTERNAL MEDICINE

## 2021-05-07 PROCEDURE — 96375 TX/PRO/DX INJ NEW DRUG ADDON: CPT

## 2021-05-07 PROCEDURE — 92941 PRQ TRLML REVSC TOT OCCL AMI: CPT | Performed by: INTERNAL MEDICINE

## 2021-05-07 PROCEDURE — C1884 EMBOLIZATION PROTECT SYST: HCPCS | Performed by: INTERNAL MEDICINE

## 2021-05-07 PROCEDURE — 85347 COAGULATION TIME ACTIVATED: CPT

## 2021-05-07 PROCEDURE — C1760 CLOSURE DEV, VASC: HCPCS | Performed by: INTERNAL MEDICINE

## 2021-05-07 DEVICE — IMPLANTABLE DEVICE: Type: IMPLANTABLE DEVICE | Status: FUNCTIONAL

## 2021-05-07 DEVICE — STENT COR DES 3.50X38MM -- DES RESOLUTE ONYX: Type: IMPLANTABLE DEVICE | Status: FUNCTIONAL

## 2021-05-07 DEVICE — STENT COR DES 3.50X18MM -- DES RESOLUTE ONYX: Type: IMPLANTABLE DEVICE | Status: FUNCTIONAL

## 2021-05-07 RX ORDER — HEPARIN SODIUM 1000 [USP'U]/ML
INJECTION, SOLUTION INTRAVENOUS; SUBCUTANEOUS AS NEEDED
Status: DISCONTINUED | OUTPATIENT
Start: 2021-05-07 | End: 2021-05-08 | Stop reason: HOSPADM

## 2021-05-07 RX ORDER — MORPHINE SULFATE 2 MG/ML
2 INJECTION, SOLUTION INTRAMUSCULAR; INTRAVENOUS
Status: DISCONTINUED | OUTPATIENT
Start: 2021-05-07 | End: 2021-05-09 | Stop reason: HOSPADM

## 2021-05-07 RX ORDER — NITROGLYCERIN 20 MG/100ML
5 INJECTION INTRAVENOUS
Status: DISCONTINUED | OUTPATIENT
Start: 2021-05-07 | End: 2021-05-07 | Stop reason: SDUPTHER

## 2021-05-07 RX ORDER — HEPARIN SODIUM 5000 [USP'U]/ML
5000 INJECTION, SOLUTION INTRAVENOUS; SUBCUTANEOUS
Status: COMPLETED | OUTPATIENT
Start: 2021-05-07 | End: 2021-05-07

## 2021-05-07 RX ORDER — NITROGLYCERIN 20 MG/100ML
0-200 INJECTION INTRAVENOUS
Status: DISCONTINUED | OUTPATIENT
Start: 2021-05-07 | End: 2021-05-08

## 2021-05-07 RX ORDER — METOPROLOL TARTRATE 50 MG/1
50 TABLET ORAL 2 TIMES DAILY
Status: DISCONTINUED | OUTPATIENT
Start: 2021-05-07 | End: 2021-05-08

## 2021-05-07 RX ORDER — MIDAZOLAM HYDROCHLORIDE 1 MG/ML
INJECTION, SOLUTION INTRAMUSCULAR; INTRAVENOUS AS NEEDED
Status: DISCONTINUED | OUTPATIENT
Start: 2021-05-07 | End: 2021-05-08 | Stop reason: HOSPADM

## 2021-05-07 RX ORDER — HEPARIN SODIUM 200 [USP'U]/100ML
INJECTION, SOLUTION INTRAVENOUS
Status: COMPLETED | OUTPATIENT
Start: 2021-05-07 | End: 2021-05-07

## 2021-05-07 RX ORDER — HEPARIN SODIUM 10000 [USP'U]/100ML
12-25 INJECTION, SOLUTION INTRAVENOUS
Status: DISCONTINUED | OUTPATIENT
Start: 2021-05-07 | End: 2021-05-07 | Stop reason: CLARIF

## 2021-05-07 RX ORDER — NITROGLYCERIN 0.4 MG/1
0.4 TABLET SUBLINGUAL
Status: DISCONTINUED | OUTPATIENT
Start: 2021-05-07 | End: 2021-05-09 | Stop reason: HOSPADM

## 2021-05-07 RX ORDER — FENTANYL CITRATE 50 UG/ML
INJECTION, SOLUTION INTRAMUSCULAR; INTRAVENOUS AS NEEDED
Status: DISCONTINUED | OUTPATIENT
Start: 2021-05-07 | End: 2021-05-08 | Stop reason: HOSPADM

## 2021-05-07 RX ORDER — NITROGLYCERIN 0.4 MG/1
0.4 TABLET SUBLINGUAL
Status: COMPLETED | OUTPATIENT
Start: 2021-05-07 | End: 2021-05-07

## 2021-05-07 RX ORDER — SODIUM CHLORIDE 9 MG/ML
INJECTION, SOLUTION INTRAVENOUS
Status: COMPLETED | OUTPATIENT
Start: 2021-05-07 | End: 2021-05-07

## 2021-05-07 RX ORDER — LIDOCAINE HYDROCHLORIDE 10 MG/ML
INJECTION INFILTRATION; PERINEURAL AS NEEDED
Status: DISCONTINUED | OUTPATIENT
Start: 2021-05-07 | End: 2021-05-08 | Stop reason: HOSPADM

## 2021-05-07 RX ORDER — PRASUGREL 10 MG/1
10 TABLET, FILM COATED ORAL DAILY
Status: DISCONTINUED | OUTPATIENT
Start: 2021-05-08 | End: 2021-05-08

## 2021-05-07 RX ORDER — GUAIFENESIN 100 MG/5ML
81 LIQUID (ML) ORAL DAILY
Status: DISCONTINUED | OUTPATIENT
Start: 2021-05-08 | End: 2021-05-09 | Stop reason: HOSPADM

## 2021-05-07 RX ORDER — MORPHINE SULFATE 2 MG/ML
2 INJECTION, SOLUTION INTRAMUSCULAR; INTRAVENOUS
Status: COMPLETED | OUTPATIENT
Start: 2021-05-07 | End: 2021-05-07

## 2021-05-07 RX ORDER — ONDANSETRON 2 MG/ML
8 INJECTION INTRAMUSCULAR; INTRAVENOUS
Status: DISPENSED | OUTPATIENT
Start: 2021-05-07 | End: 2021-05-08

## 2021-05-07 RX ORDER — HEPARIN SODIUM 10000 [USP'U]/100ML
9-25 INJECTION, SOLUTION INTRAVENOUS
Status: DISCONTINUED | OUTPATIENT
Start: 2021-05-07 | End: 2021-05-08

## 2021-05-07 RX ADMIN — HEPARIN SODIUM 5000 UNITS: 5000 INJECTION INTRAVENOUS; SUBCUTANEOUS at 21:19

## 2021-05-07 RX ADMIN — HEPARIN SODIUM 9 UNITS/KG/HR: 10000 INJECTION, SOLUTION INTRAVENOUS at 21:27

## 2021-05-07 RX ADMIN — NITROGLYCERIN 0.4 MG: 0.4 TABLET, ORALLY DISINTEGRATING SUBLINGUAL at 22:21

## 2021-05-07 RX ADMIN — METOPROLOL TARTRATE 50 MG: 50 TABLET, FILM COATED ORAL at 21:57

## 2021-05-07 RX ADMIN — NITROGLYCERIN 10 MCG/MIN: 20 INJECTION INTRAVENOUS at 22:42

## 2021-05-07 RX ADMIN — MORPHINE SULFATE 2 MG: 2 INJECTION, SOLUTION INTRAMUSCULAR; INTRAVENOUS at 21:00

## 2021-05-07 RX ADMIN — NITROGLYCERIN 0.4 MG: 0.4 TABLET, ORALLY DISINTEGRATING SUBLINGUAL at 21:57

## 2021-05-07 NOTE — Clinical Note
Lesion located in the Distal SVG involving Native. Balloon inflated using multiple inflation technique. Lesion #1: Pressure = 14 adriel; Duration = 12 sec. Inflation 2: Pressure = 14 adriel; Duration = 6 sec. Inflation 3: Pressure = 14 adriel; Duration = 10 sec. Inflation 4: Pressure = 15 adriel; Duration = 10 sec. Inflation 5: Pressure = 14 adriel; Duration = 4 sec.    Euphora 2.0x12

## 2021-05-07 NOTE — Clinical Note
Stent inserted. Single technique used. First inflation pressure = 14 adriel; inflation time: 40 sec.

## 2021-05-07 NOTE — ED TRIAGE NOTES
Triage: Pt arrives ambulatory from home with CC of chest pain x30 minutes. Pt reports the pain is worsened by activity. He denies N/V. Denies diaphoresis. Hx of MI. Followed by Dr. Kailee Bonds.

## 2021-05-07 NOTE — Clinical Note
TRANSFER - OUT REPORT:     Verbal report given to: Floor. Report consisted of patient's Situation, Background, Assessment and   Recommendations(SBAR). Opportunity for questions and clarification was provided. Patient transported with a Registered Nurse, [address] [place] [address] / Patient Care Tech, Monitor and Oxygen. Oxygen used for patient = nasal cannula, @ 2 - 6 Liters.

## 2021-05-07 NOTE — Clinical Note
Multiple inflations used. First inflation pressure = 14 adriel; inflation time: 20 sec. Second inflation pressure: 14 adriel; inflation time: 10 sec.

## 2021-05-07 NOTE — Clinical Note
Patient transported with a Registered Nurse and [address] [place] [address] / [facility].

## 2021-05-07 NOTE — Clinical Note
Lesion located in the Ostium SVG. Balloon inflated using multiple inflation technique. Lesion #1: Pressure = 16 adriel; Duration = 20 sec. Inflation 2: Pressure = 16 adriel; Duration = 15 sec.    ELIE Celeste

## 2021-05-07 NOTE — Clinical Note
Lesion: Located in the Ostium SVG. Multiple inflations used. First inflation pressure = 16 adriel; inflation time: 20 sec. Second inflation pressure: 16 adriel; inflation time: 10 sec.

## 2021-05-07 NOTE — Clinical Note
Lesion located in the Distal SVG involving Native. Balloon inserted. Balloon inflated using multiple inflation technique. Lesion #1: Pressure = 12 adriel; Duration = 10 sec. Inflation 2: Pressure = 12 adriel; Duration = 15 sec. Inflation 3: Pressure = 12 adriel; Duration = 12 sec. Inflation 4: Pressure = 12 adriel; Duration = 10 sec.    Euphora 3.5x15

## 2021-05-07 NOTE — Clinical Note
Stent inserted. Single technique and multiple inflations used. First inflation pressure = 14 adriel; inflation time: 20 sec. Second inflation pressure: 14 adriel; inflation time: 10 sec.

## 2021-05-08 LAB
ALBUMIN SERPL-MCNC: 3.2 G/DL (ref 3.5–5)
ALBUMIN/GLOB SERPL: 1 {RATIO} (ref 1.1–2.2)
ALP SERPL-CCNC: 83 U/L (ref 45–117)
ALT SERPL-CCNC: 26 U/L (ref 12–78)
ANION GAP SERPL CALC-SCNC: 6 MMOL/L (ref 5–15)
AST SERPL-CCNC: 19 U/L (ref 15–37)
BASOPHILS # BLD: 0.1 K/UL (ref 0–0.1)
BASOPHILS NFR BLD: 1 % (ref 0–1)
BILIRUB SERPL-MCNC: 0.3 MG/DL (ref 0.2–1)
BUN SERPL-MCNC: 16 MG/DL (ref 6–20)
BUN/CREAT SERPL: 17 (ref 12–20)
CALCIUM SERPL-MCNC: 8.4 MG/DL (ref 8.5–10.1)
CHLORIDE SERPL-SCNC: 110 MMOL/L (ref 97–108)
CO2 SERPL-SCNC: 28 MMOL/L (ref 21–32)
CREAT SERPL-MCNC: 0.94 MG/DL (ref 0.7–1.3)
DIFFERENTIAL METHOD BLD: NORMAL
EOSINOPHIL # BLD: 0.2 K/UL (ref 0–0.4)
EOSINOPHIL NFR BLD: 3 % (ref 0–7)
ERYTHROCYTE [DISTWIDTH] IN BLOOD BY AUTOMATED COUNT: 13 % (ref 11.5–14.5)
GLOBULIN SER CALC-MCNC: 3.2 G/DL (ref 2–4)
GLUCOSE BLD STRIP.AUTO-MCNC: 121 MG/DL (ref 65–100)
GLUCOSE BLD STRIP.AUTO-MCNC: 131 MG/DL (ref 65–100)
GLUCOSE BLD STRIP.AUTO-MCNC: 185 MG/DL (ref 65–100)
GLUCOSE BLD STRIP.AUTO-MCNC: 191 MG/DL (ref 65–100)
GLUCOSE SERPL-MCNC: 146 MG/DL (ref 65–100)
HCT VFR BLD AUTO: 43.8 % (ref 36.6–50.3)
HGB BLD-MCNC: 14.4 G/DL (ref 12.1–17)
IMM GRANULOCYTES # BLD AUTO: 0 K/UL (ref 0–0.04)
IMM GRANULOCYTES NFR BLD AUTO: 0 % (ref 0–0.5)
LYMPHOCYTES # BLD: 3 K/UL (ref 0.8–3.5)
LYMPHOCYTES NFR BLD: 36 % (ref 12–49)
MCH RBC QN AUTO: 29.9 PG (ref 26–34)
MCHC RBC AUTO-ENTMCNC: 32.9 G/DL (ref 30–36.5)
MCV RBC AUTO: 90.9 FL (ref 80–99)
MONOCYTES # BLD: 0.8 K/UL (ref 0–1)
MONOCYTES NFR BLD: 9 % (ref 5–13)
NEUTS SEG # BLD: 4.3 K/UL (ref 1.8–8)
NEUTS SEG NFR BLD: 51 % (ref 32–75)
NRBC # BLD: 0 K/UL (ref 0–0.01)
NRBC BLD-RTO: 0 PER 100 WBC
PLATELET # BLD AUTO: 244 K/UL (ref 150–400)
PMV BLD AUTO: 9.7 FL (ref 8.9–12.9)
POTASSIUM SERPL-SCNC: 3.5 MMOL/L (ref 3.5–5.1)
PROT SERPL-MCNC: 6.4 G/DL (ref 6.4–8.2)
RBC # BLD AUTO: 4.82 M/UL (ref 4.1–5.7)
SERVICE CMNT-IMP: ABNORMAL
SODIUM SERPL-SCNC: 144 MMOL/L (ref 136–145)
WBC # BLD AUTO: 8.4 K/UL (ref 4.1–11.1)

## 2021-05-08 PROCEDURE — 74011250636 HC RX REV CODE- 250/636: Performed by: INTERNAL MEDICINE

## 2021-05-08 PROCEDURE — 36415 COLL VENOUS BLD VENIPUNCTURE: CPT

## 2021-05-08 PROCEDURE — 82962 GLUCOSE BLOOD TEST: CPT

## 2021-05-08 PROCEDURE — 74011250637 HC RX REV CODE- 250/637: Performed by: INTERNAL MEDICINE

## 2021-05-08 PROCEDURE — 74011636637 HC RX REV CODE- 636/637: Performed by: INTERNAL MEDICINE

## 2021-05-08 PROCEDURE — 85347 COAGULATION TIME ACTIVATED: CPT

## 2021-05-08 PROCEDURE — 77010033678 HC OXYGEN DAILY

## 2021-05-08 PROCEDURE — 80053 COMPREHEN METABOLIC PANEL: CPT

## 2021-05-08 PROCEDURE — 85025 COMPLETE CBC W/AUTO DIFF WBC: CPT

## 2021-05-08 PROCEDURE — 93005 ELECTROCARDIOGRAM TRACING: CPT

## 2021-05-08 PROCEDURE — 74011000636 HC RX REV CODE- 636: Performed by: INTERNAL MEDICINE

## 2021-05-08 PROCEDURE — 65660000000 HC RM CCU STEPDOWN

## 2021-05-08 RX ORDER — INSULIN LISPRO 100 [IU]/ML
INJECTION, SOLUTION INTRAVENOUS; SUBCUTANEOUS
Status: DISCONTINUED | OUTPATIENT
Start: 2021-05-08 | End: 2021-05-09 | Stop reason: HOSPADM

## 2021-05-08 RX ORDER — INSULIN GLARGINE 100 [IU]/ML
45 INJECTION, SOLUTION SUBCUTANEOUS
Status: DISCONTINUED | OUTPATIENT
Start: 2021-05-08 | End: 2021-05-09 | Stop reason: HOSPADM

## 2021-05-08 RX ORDER — CYCLOSPORINE 0.5 MG/ML
1 EMULSION OPHTHALMIC DAILY
COMMUNITY

## 2021-05-08 RX ORDER — ONDANSETRON 2 MG/ML
4 INJECTION INTRAMUSCULAR; INTRAVENOUS
Status: DISCONTINUED | OUTPATIENT
Start: 2021-05-08 | End: 2021-05-09 | Stop reason: HOSPADM

## 2021-05-08 RX ORDER — NEBIVOLOL 5 MG/1
5 TABLET ORAL EVERY EVENING
Status: DISCONTINUED | OUTPATIENT
Start: 2021-05-08 | End: 2021-05-09 | Stop reason: HOSPADM

## 2021-05-08 RX ORDER — FUROSEMIDE 40 MG/1
40 TABLET ORAL DAILY
Status: DISCONTINUED | OUTPATIENT
Start: 2021-05-08 | End: 2021-05-09 | Stop reason: HOSPADM

## 2021-05-08 RX ORDER — MAGNESIUM SULFATE 100 %
4 CRYSTALS MISCELLANEOUS AS NEEDED
Status: DISCONTINUED | OUTPATIENT
Start: 2021-05-08 | End: 2021-05-09 | Stop reason: HOSPADM

## 2021-05-08 RX ORDER — INSULIN GLARGINE 100 [IU]/ML
85 INJECTION, SOLUTION SUBCUTANEOUS
Status: DISCONTINUED | OUTPATIENT
Start: 2021-05-08 | End: 2021-05-08 | Stop reason: SDUPTHER

## 2021-05-08 RX ORDER — PROMETHAZINE HYDROCHLORIDE 25 MG/1
12.5 TABLET ORAL
Status: DISCONTINUED | OUTPATIENT
Start: 2021-05-08 | End: 2021-05-09 | Stop reason: HOSPADM

## 2021-05-08 RX ORDER — SODIUM CHLORIDE 0.9 % (FLUSH) 0.9 %
5-40 SYRINGE (ML) INJECTION AS NEEDED
Status: DISCONTINUED | OUTPATIENT
Start: 2021-05-08 | End: 2021-05-09 | Stop reason: HOSPADM

## 2021-05-08 RX ORDER — ACETAMINOPHEN 325 MG/1
650 TABLET ORAL
Status: DISCONTINUED | OUTPATIENT
Start: 2021-05-08 | End: 2021-05-09 | Stop reason: HOSPADM

## 2021-05-08 RX ORDER — RANOLAZINE 500 MG/1
500 TABLET, EXTENDED RELEASE ORAL 2 TIMES DAILY
Status: DISCONTINUED | OUTPATIENT
Start: 2021-05-08 | End: 2021-05-09 | Stop reason: HOSPADM

## 2021-05-08 RX ORDER — DEXTROSE 50 % IN WATER (D50W) INTRAVENOUS SYRINGE
25-50 AS NEEDED
Status: DISCONTINUED | OUTPATIENT
Start: 2021-05-08 | End: 2021-05-09 | Stop reason: HOSPADM

## 2021-05-08 RX ORDER — DICLOFENAC SODIUM 75 MG/1
75 TABLET, DELAYED RELEASE ORAL
COMMUNITY
End: 2021-05-14

## 2021-05-08 RX ORDER — SODIUM CHLORIDE 0.9 % (FLUSH) 0.9 %
5-40 SYRINGE (ML) INJECTION EVERY 8 HOURS
Status: DISCONTINUED | OUTPATIENT
Start: 2021-05-08 | End: 2021-05-09 | Stop reason: HOSPADM

## 2021-05-08 RX ORDER — INSULIN GLARGINE 100 [IU]/ML
40 INJECTION, SOLUTION SUBCUTANEOUS
Status: DISCONTINUED | OUTPATIENT
Start: 2021-05-08 | End: 2021-05-09 | Stop reason: HOSPADM

## 2021-05-08 RX ORDER — ACETAMINOPHEN 650 MG/1
650 SUPPOSITORY RECTAL
Status: DISCONTINUED | OUTPATIENT
Start: 2021-05-08 | End: 2021-05-09 | Stop reason: HOSPADM

## 2021-05-08 RX ORDER — POLYETHYLENE GLYCOL 3350 17 G/17G
17 POWDER, FOR SOLUTION ORAL DAILY PRN
Status: DISCONTINUED | OUTPATIENT
Start: 2021-05-08 | End: 2021-05-09 | Stop reason: HOSPADM

## 2021-05-08 RX ADMIN — NEBIVOLOL 5 MG: 5 TABLET ORAL at 21:28

## 2021-05-08 RX ADMIN — INSULIN GLARGINE 45 UNITS: 100 INJECTION, SOLUTION SUBCUTANEOUS at 21:28

## 2021-05-08 RX ADMIN — SACUBITRIL AND VALSARTAN 1 TABLET: 24; 26 TABLET, FILM COATED ORAL at 11:05

## 2021-05-08 RX ADMIN — FUROSEMIDE 40 MG: 40 TABLET ORAL at 11:05

## 2021-05-08 RX ADMIN — RANOLAZINE 500 MG: 500 TABLET, EXTENDED RELEASE ORAL at 22:00

## 2021-05-08 RX ADMIN — TICAGRELOR 90 MG: 90 TABLET ORAL at 11:05

## 2021-05-08 RX ADMIN — Medication 10 ML: at 00:42

## 2021-05-08 RX ADMIN — TICAGRELOR 90 MG: 90 TABLET ORAL at 21:29

## 2021-05-08 RX ADMIN — MORPHINE SULFATE 2 MG: 2 INJECTION, SOLUTION INTRAMUSCULAR; INTRAVENOUS at 00:51

## 2021-05-08 RX ADMIN — METOPROLOL TARTRATE 50 MG: 50 TABLET, FILM COATED ORAL at 08:38

## 2021-05-08 RX ADMIN — INSULIN LISPRO 2 UNITS: 100 INJECTION, SOLUTION INTRAVENOUS; SUBCUTANEOUS at 17:44

## 2021-05-08 RX ADMIN — SACUBITRIL AND VALSARTAN 1 TABLET: 24; 26 TABLET, FILM COATED ORAL at 21:29

## 2021-05-08 RX ADMIN — Medication 10 ML: at 05:57

## 2021-05-08 RX ADMIN — INSULIN GLARGINE 40 UNITS: 100 INJECTION, SOLUTION SUBCUTANEOUS at 21:28

## 2021-05-08 RX ADMIN — ASPIRIN 81 MG: 81 TABLET, CHEWABLE ORAL at 08:38

## 2021-05-08 NOTE — PROGRESS NOTES
Brief Procedure Note    Patient: Carole Paredes MRN: [de-identified]  SSN: [id number]    YOB: 1960  Age: 57 y.o. Sex: male      Date of Procedure: 5/8/2021     Pre-procedure Diagnosis: USA/NSTEMI    Post-procedure Diagnosis: 2v CAD, patent L-L, occ SVG-RCA, normal LVEDP    Procedure: Ultrasound-guided vascular access, LHC, cors, yony bypass angio, mechanical thrombectomy, PTCA/PCI of the SVG-RCA w/ 4 SANTOS    Performed By: Sterling Busby III, DO     Anesthesia: Moderate Sedation    Estimated Blood Loss: Less than 10 mL      Specimens:  None    Findings: as above    Complications: None    Implants: 4 Anthony SANTOS, 1 Angioseal     Recommendations: Continue medical therapy.     Signed By: Sterling Busby III, DO     May 8, 2021

## 2021-05-08 NOTE — CONSULTS
CARDIOLOGY CONSULT                 Assessment:     Assessment:       Active Problems:    * No active hospital problems. *       Plan:    1. Chest pain:  Troponin 1.99 this is as expected and declined   Would still be expected to be elevated after troponin 17.8 on 5/3/21  Noted on cath If he has persistent symptoms despite maximally tolerated medical therapy, consideration could be given to RCA  PCI. Discussed with Dr Chayo Arnold: Will try to medically get pain free as  or RCA is a complicated procedure . If not successful then would bring cath to redefine anatomy     Plan;  Heparin ggt. tng ggt, as , palvix, continue b blocker  ( lopressor as bysolic not on formulary and no renexa on formulary)    2. CAD: hx cabg  / stent   3. CM  Ef 45 % lae echo 5/4/21  Continue GDMT   4. Recent nstemi :    complete occlusion of the SVG-RCA. The distal RPL/RPDA fills via left-to-right collaterals from the true AV groove circumflex. Unchanged CAD otherwise with widely patent stents in the pckaufzw-ex-rzx LCx and  of the ostial LAD with a widely patent LIMA-mLAD which backfills D1 and D2.    Subtotally occluded ramus intermedius with faint filling of the distal vessel (unchanged). 5. Diastolic dysfunciton :   lvedp  32-34  6. DM II         Subjective:    Date of  Admission: 5/7/2021  8:39 PM     Admission type:Emergency    Allison Lei is a 57 y.o. male admitted for No admission diagnoses are documented for this encounter. h/o type II diabetes and CAD with prior CABG, who presented to the ER with symptoms of left sided chest pain radiating to the left arm. This was typical of prior anginal symptoms on 5/3/21. Underwent cath which demonstrated previously stented SVG to the RCA was 100% occluded with thrombus and this appeared to be the culprit lesion. His native RCA is a  which fills via left to right collaterals. :He underwent  Stent to the svg.    If he has persistent symptoms despite maximally tolerated medical therapy, consideration could be given to RCA  PCI. Cath result:     Left Main   The vessel was visualized by angiography. The vessel is angiographically normal.   Dist LM to Prox LAD lesion, 100% stenosed. The lesion was previously treated using a stent (unknown type). Left Anterior Descending   Previously placed Mid LAD stent (unknown type) is widely patent. Dist LAD lesion, 80% stenosed. Left Circumflex   Previously placed Ost Cx to Mid Cx stent (unknown type) is widely patent. First Obtuse Marginal Branch   Ost 1st Mrg lesion, 99% stenosed. Right Coronary Artery   Prox RCA lesion, 60% stenosed. Prox RCA to Mid RCA lesion, 60% stenosed. Mid RCA lesion, 100% stenosed. Graft to Dist RCA   Origin lesion, 100% stenosed. Previously placed Prox Graft stent (unknown type) is widely patent. Previously placed Dist Graft stent (unknown type) is widely patent. The lesion is thrombotic. LIMA Graft to Mid LAD   The graft is angiographically normal.   Graft to Mid Cx   Origin to Prox Graft lesion, 100% stenosed. Patient Active Problem List    Diagnosis Date Noted    STEMI (ST elevation myocardial infarction) ([address] 02/20/2019     Priority: 1 - One    Acute ST elevation myocardial infarction (STEMI) of inferior wall ([address] 02/21/2019    Unstable angina ([address] 26/69/6800    Systolic CHF, acute ([address] 05/23/2018    NSTEMI (non-ST elevated myocardial infarction) ([address] 05/18/2018      Surinder Perales MD  Past Medical History:   Diagnosis Date    Diabetes ([address]     Glaucoma     right eye    Hypercholesteremia     Hypertension     Kidney stones     MI, old       Past Surgical History:   Procedure Laterality Date    HX CORONARY ARTERY BYPASS GRAFT      HX CORONARY STENT PLACEMENT      HX WISDOM TEETH EXTRACTION       Allergies   Allergen Reactions    Tape [Adhesive] Hives      No family history on file.    Current Facility-Administered Medications   Medication Dose Route Frequency    ondansetron (ZOFRAN) injection 8 mg  8 mg IntraVENous NOW    heparin (porcine) injection 5,000 Units  5,000 Units IntraVENous NOW    heparin 25,000 units in D5W 250 ml infusion  9-25 Units/kg/hr IntraVENous TITRATE     Current Outpatient Medications   Medication Sig    evolocumab (Repatha Pushtronex) 420 mg/3.5 mL Injt 10 mg by SubCUTAneous route every thirty (30) days. Every 2 weeks on Sunday; next dose due 5/20/18    ranolazine ER (Ranexa) 500 mg SR tablet Take 1 Tab by mouth two (2) times a day.  furosemide (Lasix) 40 mg tablet Take 1 Tab by mouth two (2) times a day.  cholecalciferol, vitamin D3, (VITAMIN D3) 2,000 unit tab Take 2,000 Units by mouth daily.  multivits,Stress Formula-Zinc tablet Take 1 Tab by mouth daily.  sacubitril-valsartan (ENTRESTO) 24 mg/26 mg tablet Take 1 Tab by mouth every twelve (12) hours.  insulin glargine (LANTUS,BASAGLAR) 100 unit/mL (3 mL) inpn 85 Units by SubCUTAneous route nightly.  dapagliflozin (FARXIGA) 10 mg tab tablet Take 10 mg by mouth daily.  aspirin delayed-release 81 mg tablet Take 1 Tab by mouth daily.  prasugrel (EFFIENT) 10 mg tablet Take 1 Tab by mouth nightly.  nebivolol (BYSTOLIC) 5 mg tablet Take 5 mg by mouth every evening.  DULoxetine (CYMBALTA) 30 mg capsule Take 30 mg by mouth daily.  glipiZIDE SR (GLUCOTROL XL) 5 mg CR tablet Take 10 mg by mouth Before breakfast and dinner.

## 2021-05-08 NOTE — PROGRESS NOTES
Cardiac Cath Lab Procedure Area Arrival Note:    Rey Jimenez arrived to Cardiac Cath Lab, Procedure Area. Patient identifiers verified with NAME and DATE OF BIRTH. Procedure verified with patient. Consent forms verified. Allergies verified. Patient informed of procedure and plan of care. Questions answered with review. Patient voiced understanding of procedure and plan of care. Patient on cardiac monitor, non-invasive blood pressure, SPO2 monitor. On room air. Patient status doing well with some problems : pain of 4. Patient is A&Ox 4. Patient reports pain in chest of 4. Patient medicated during procedure with orders obtained and verified by Dr. Jet Velasquez. Refer to patients Cardiac Cath Lab PROCEDURE REPORT for vital signs, assessment, status, and response during procedure, printed at end of case. Printed report on chart or scanned into chart. 5/8/21 0011  Transfer to [facility] from Procedure Area    Verbal report given to Francesco Frankel on Rey Jimenez being transferred to Cardiac Cath Lab RR for routine progression of care   Patient is post PCI procedure. Patient stable upon transfer to ccu. Report consisted of patients Situation, Background, Assessment and   Recommendations(SBAR). Information from the following report(s) SBAR was reviewed with the receiving nurse. Opportunity for questions and clarification was provided. Patient medicated during procedure with orders obtained and verified by Dr. Jet Velasquez. Refer to patient PROCEDURE REPORT for vital signs, assessment, status, and response during procedure.

## 2021-05-08 NOTE — PROGRESS NOTES
0035 Pt arrived in CCU in bed, oriented to room and call system. Wife at bedside. VSS, denies pain. Groin site WNL. 56 Wife headed home for the night. Complete chlorhexidine bed bath provided, tolerated well.  0100 EKG completed. Morphine given for right leg discomfort. 0530 Labs drawn. Groin site WNL. VSS, denies pain. Call bell at side. 0730 Bedside and Verbal shift change report given to Uzair Chaidez (oncoming nurse) by Nahomi Tucker (offgoing nurse). Report included the following information SBAR, Kardex, Intake/Output, MAR and Recent Results.

## 2021-05-08 NOTE — PROGRESS NOTES
Cardiology Progress Note                                        Admit Date: 5/7/2021    Assessment/Plan:     1. NSTEI :         Sp emergent cath:  5/7/21  , mechanical thrombectomy, PTCA/PCI of the SVG-RCA w/ 4 SANTOS    Chest pain now resolved   2. CAD: hx cabg  / stent   3. CM  Ef 45 % lae echo 5/4/21  Continue GDMT   4. Recent nstemi :    complete occlusion of the SVG-RCA.  The distal RPL/RPDA fills via left-to-right collaterals from the true AV groove circumflex. Unchanged CAD otherwise with widely patent stents in the mjuhocwn-ba-waz LCx and  of the ostial LAD with a widely patent LIMA-mLAD whichackfills D1 and D2.    Subt otally occluded ramus intermedius with faint filling of the distal vessel (unchanged). 5. Diastolic dysfunciton :   lvedp  32-34  now normalized at most recent cath   6. DM II     switching effient to berlinta 90 bid       Mitesh Mckeon is a 57 y.o. male with      PROBLEM LIST:  Patient Active Problem List    Diagnosis Date Noted    Chest pain 05/07/2021     Priority: 1 - One    STEMI (ST elevation myocardial infarction) ([facility] 02/20/2019     Priority: 1 - One    Acute ST elevation myocardial infarction (STEMI) of inferior wall ([address] 02/21/2019    Unstable angina ([facility] 34/80/9699    Systolic CHF, acute ([facility] 05/23/2018    NSTEMI (non-ST elevated myocardial infarction) ([facility] 05/18/2018         Subjective:     Mitesh Mckeon denies chest pain. Visit Vitals  /71 (BP 1 Location: Left arm, BP Patient Position: At rest)   Pulse 75   Temp 97.9 °F (36.6 °C)   Resp 14   Wt 110.6 kg (243 lb 13.3 oz)   SpO2 96%   BMI 32.17 kg/m²       Intake/Output Summary (Last 24 hours) at 5/8/2021 0842  Last data filed at 5/8/2021 0600  Gross per 24 hour   Intake 536.64 ml   Output 950 ml   Net -413.36 ml       Objective:      Physical Exam:  HEENT: Perrla, EOMI  Neck: No JVD,  No thyroidmegaly  Resp: CTA bilaterally;  No wheezes or rales  CV: RRR s1s2 No murmur no s3  Abd:Soft, Nontender  Ext: No edema  Neuro: Alert and oriented; Nonfocal  Skin: Warm, Dry, Intact  Pulses: 2+ DP/PT/Rad      Telemetry: normal sinus rhythm    Current Facility-Administered Medications   Medication Dose Route Frequency    ticagrelor (BRILINTA) tablet 90 mg  90 mg Oral Q12H    sodium chloride (NS) flush 5-40 mL  5-40 mL IntraVENous Q8H    sodium chloride (NS) flush 5-40 mL  5-40 mL IntraVENous PRN    acetaminophen (TYLENOL) tablet 650 mg  650 mg Oral Q6H PRN    Or    acetaminophen (TYLENOL) suppository 650 mg  650 mg Rectal Q6H PRN    polyethylene glycol (MIRALAX) packet 17 g  17 g Oral DAILY PRN    promethazine (PHENERGAN) tablet 12.5 mg  12.5 mg Oral Q6H PRN    Or    ondansetron (ZOFRAN) injection 4 mg  4 mg IntraVENous Q6H PRN    ondansetron (ZOFRAN) injection 8 mg  8 mg IntraVENous NOW    nitroglycerin (NITROSTAT) tablet 0.4 mg  0.4 mg SubLINGual Q5MIN PRN    morphine injection 2 mg  2 mg IntraVENous Q4H PRN    aspirin chewable tablet 81 mg  81 mg Oral DAILY    metoprolol tartrate (LOPRESSOR) tablet 50 mg  50 mg Oral BID         Data Review:   Labs:    Recent Results (from the past 24 hour(s))   EKG, 12 LEAD, INITIAL    Collection Time: 05/07/21  7:50 PM   Result Value Ref Range    Ventricular Rate 83 BPM    Atrial Rate 83 BPM    P-R Interval 216 ms    QRS Duration 174 ms    Q-T Interval 466 ms    QTC Calculation (Bezet) 547 ms    Calculated P Axis 62 degrees    Calculated R Axis -36 degrees    Calculated T Axis -7 degrees    Diagnosis       Sinus rhythm with 1st degree AV block  Left axis deviation  Right bundle branch block  Inferior infarct (cited on or before 07-JUN-2018)  Anterolateral infarct , age undetermined  When compared with ECG of 03-MAY-2021 09:24,  Significant changes have occurred     SAMPLES BEING HELD    Collection Time: 05/07/21  8:00 PM   Result Value Ref Range    SAMPLES BEING HELD 1RED     COMMENT        Add-on orders for these samples will be processed based on acceptable specimen integrity and analyte stability, which may vary by analyte. METABOLIC PANEL, COMPREHENSIVE    Collection Time: 05/07/21  8:00 PM   Result Value Ref Range    Sodium 139 136 - 145 mmol/L    Potassium 3.2 (L) 3.5 - 5.1 mmol/L    Chloride 104 97 - 108 mmol/L    CO2 28 21 - 32 mmol/L    Anion gap 7 5 - 15 mmol/L    Glucose 201 (H) 65 - 100 mg/dL    BUN 17 6 - 20 MG/DL    Creatinine 0.93 0.70 - 1.30 MG/DL    BUN/Creatinine ratio 18 12 - 20      GFR est AA >60 >60 ml/min/1.73m2    GFR est non-AA >60 >60 ml/min/1.73m2    Calcium 9.0 8.5 - 10.1 MG/DL    Bilirubin, total 0.4 0.2 - 1.0 MG/DL    ALT (SGPT) 32 12 - 78 U/L    AST (SGOT) 21 15 - 37 U/L    Alk. phosphatase 100 45 - 117 U/L    Protein, total 7.6 6.4 - 8.2 g/dL    Albumin 3.8 3.5 - 5.0 g/dL    Globulin 3.8 2.0 - 4.0 g/dL    A-G Ratio 1.0 (L) 1.1 - 2.2     CBC WITH AUTOMATED DIFF    Collection Time: 05/07/21  8:00 PM   Result Value Ref Range    WBC 8.9 4.1 - 11.1 K/uL    RBC 5.28 4.10 - 5.70 M/uL    HGB 15.9 12.1 - 17.0 g/dL    HCT 46.9 36.6 - 50.3 %    MCV 88.8 80.0 - 99.0 FL    MCH 30.1 26.0 - 34.0 PG    MCHC 33.9 30.0 - 36.5 g/dL    RDW 13.0 11.5 - 14.5 %    PLATELET 660 474 - 262 K/uL    MPV 10.1 8.9 - 12.9 FL    NRBC 0.0 0  WBC    ABSOLUTE NRBC 0.00 0.00 - 0.01 K/uL    NEUTROPHILS 52 32 - 75 %    LYMPHOCYTES 38 12 - 49 %    MONOCYTES 6 5 - 13 %    EOSINOPHILS 4 0 - 7 %    BASOPHILS 0 0 - 1 %    IMMATURE GRANULOCYTES 0 0.0 - 0.5 %    ABS. NEUTROPHILS 4.6 1.8 - 8.0 K/UL    ABS. LYMPHOCYTES 3.4 0.8 - 3.5 K/UL    ABS. MONOCYTES 0.6 0.0 - 1.0 K/UL    ABS. EOSINOPHILS 0.3 0.0 - 0.4 K/UL    ABS. BASOPHILS 0.0 0.0 - 0.1 K/UL    ABS. IMM.  GRANS. 0.0 0.00 - 0.04 K/UL    DF AUTOMATED     TROPONIN I    Collection Time: 05/07/21  8:00 PM   Result Value Ref Range    Troponin-I, Qt. 1.99 (H) <0.05 ng/mL   DRUG SCREEN, URINE    Collection Time: 05/07/21  9:53 PM   Result Value Ref Range    AMPHETAMINES Negative NEG      BARBITURATES Negative NEG      BENZODIAZEPINES Negative NEG      COCAINE Negative NEG      METHADONE Negative NEG      OPIATES Negative NEG      PCP(PHENCYCLIDINE) Negative NEG      THC (TH-CANNABINOL) Negative NEG      Drug screen comment (NOTE)    EKG, 12 LEAD, INITIAL    Collection Time: 05/08/21 12:55 AM   Result Value Ref Range    Ventricular Rate 78 BPM    Atrial Rate 78 BPM    P-R Interval 284 ms    QRS Duration 146 ms    Q-T Interval 448 ms    QTC Calculation (Bezet) 510 ms    Calculated P Axis 54 degrees    Calculated R Axis 27 degrees    Calculated T Axis 45 degrees    Diagnosis       Sinus rhythm with 1st degree AV block  Possible Left atrial enlargement  Right bundle branch block  Inferior infarct (cited on or before 07-JUN-2018)  ** ACUTE MI **  When compared with ECG of 07-MAY-2021 19:50,  Criteria for Anterior infarct are no longer present  Criteria for Anterolateral infarct are no longer present  Questionable change in initial forces of Inferior leads     CBC WITH AUTOMATED DIFF    Collection Time: 05/08/21  5:56 AM   Result Value Ref Range    WBC 8.4 4.1 - 11.1 K/uL    RBC 4.82 4.10 - 5.70 M/uL    HGB 14.4 12.1 - 17.0 g/dL    HCT 43.8 36.6 - 50.3 %    MCV 90.9 80.0 - 99.0 FL    MCH 29.9 26.0 - 34.0 PG    MCHC 32.9 30.0 - 36.5 g/dL    RDW 13.0 11.5 - 14.5 %    PLATELET 650 372 - 548 K/uL    MPV 9.7 8.9 - 12.9 FL    NRBC 0.0 0  WBC    ABSOLUTE NRBC 0.00 0.00 - 0.01 K/uL    NEUTROPHILS 51 32 - 75 %    LYMPHOCYTES 36 12 - 49 %    MONOCYTES 9 5 - 13 %    EOSINOPHILS 3 0 - 7 %    BASOPHILS 1 0 - 1 %    IMMATURE GRANULOCYTES 0 0.0 - 0.5 %    ABS. NEUTROPHILS 4.3 1.8 - 8.0 K/UL    ABS. LYMPHOCYTES 3.0 0.8 - 3.5 K/UL    ABS. MONOCYTES 0.8 0.0 - 1.0 K/UL    ABS. EOSINOPHILS 0.2 0.0 - 0.4 K/UL    ABS. BASOPHILS 0.1 0.0 - 0.1 K/UL    ABS. IMM.  GRANS. 0.0 0.00 - 0.04 K/UL    DF AUTOMATED

## 2021-05-08 NOTE — INTERDISCIPLINARY ROUNDS
Multidisciplinary rounds were held 5/8/2021. Today's plan/goal includes (but not limited to): stable vitals, pain free, groin site WNL.

## 2021-05-08 NOTE — PROGRESS NOTES
TRANSFER - OUT REPORT:    Verbal report given to Jesus Toledo RN(name) on Leanne Powers  being transferred to CCU 23(unit) for routine progression of care       Report consisted of patients Situation, Background, Assessment and   Recommendations(SBAR). Information from the following report(s) SBAR, Procedure Summary, MAR and Cardiac Rhythm NSR was reviewed with the receiving nurse. Lines:   Peripheral IV 05/07/21 Right Forearm (Active)       Peripheral IV 05/07/21 Left Antecubital (Active)   Phlebitis Assessment 0 05/07/21 2215   Infiltration Assessment 0 05/07/21 2215   Dressing Status Clean, dry, & intact 05/07/21 2215   Dressing Type Transparent 05/07/21 2215   Hub Color/Line Status Pink 05/07/21 2215        Opportunity for questions and clarification was provided.       Patient transported with:   Monitor  Registered Nurse  Tech

## 2021-05-08 NOTE — PROGRESS NOTES
0730- Report obtained from [facility].  0900- Patient's wife is at the bedside. Breakfast is served. Medications given. 1050- Patient up to the bathroom. Wife at the bedside to assist. Tolerated well. Surgical site is WNL. 1230- Lunch is served. Pharmacy requesting family bring in home medications that are non-formulary. Wife agrees to bring them in later today. 1340- Patient back to bed. Feeling well. 1800- Sitting in chair for dinner. Feeling well. 1900- TRANSFER - OUT REPORT:    Verbal report given to Usha Garcia on Nay Magdaleno  being transferred to CV[facility] for routine progression of care       Report consisted of patients Situation, Background, Assessment and   Recommendations(SBAR). Information from the following report(s) SBAR, Kardex and MAR was reviewed with the receiving nurse. Lines:   Peripheral IV 05/07/21 Right Forearm (Active)   Site Assessment Clean, dry, & intact 05/08/21 1600   Phlebitis Assessment 0 05/08/21 1600   Infiltration Assessment 0 05/08/21 1600   Dressing Status Clean, dry, & intact 05/08/21 1600   Dressing Type Transparent 05/08/21 1600   Hub Color/Line Status Pink; Infusing 05/08/21 1600   Action Taken Open ports on tubing capped 05/08/21 1600   Alcohol Cap Used Yes 05/08/21 1600       Peripheral IV 05/07/21 Left Antecubital (Active)   Site Assessment Clean, dry, & intact 05/08/21 1600   Phlebitis Assessment 0 05/08/21 1600   Infiltration Assessment 0 05/08/21 1600   Dressing Status Clean, dry, & intact 05/08/21 1600   Dressing Type Transparent 05/08/21 1600   Hub Color/Line Status Pink;Flushed;Capped 05/08/21 1600   Action Taken Open ports on tubing capped 05/08/21 1600   Alcohol Cap Used Yes 05/08/21 1600        Opportunity for questions and clarification was provided.       Patient transported with:   Monitor  Registered Nurse

## 2021-05-08 NOTE — PROGRESS NOTES
TRANSFER - IN REPORT:    Verbal report received from Jane(name) on [place]  being received from cath lab(unit) for routine progression of care      Report consisted of patients Situation, Background, Assessment and   Recommendations(SBAR). Information from the following report(s) SBAR, Kardex, Procedure Summary, Intake/Output, MAR, Recent Results and Alarm Parameters  was reviewed with the receiving nurse. Opportunity for questions and clarification was provided. Assessment completed upon patients arrival to unit and care assumed.      Primary Nurse Rachel Espinoza RN and Jeanice Epley, RN performed a dual skin assessment on this patient No impairment noted    Current Bed:     [facility]    Carlos score is 17

## 2021-05-08 NOTE — CONSULTS
Consult    NAME: Nehemiah iYng   :  1960   MRN:  [id number]     Date/Time:  2021 10:40 PM    Patient PCP: Surinder Perales MD  ________________________________________________________________________     Assessment:     1. Chest pain, elevated troponin/NSTEMI (not unexpected after very recent NSTEMI)  2. CAD s/p 2v CABD (SVG-RCA, L-L) w/ recent cath w/ occluded SVG-RCA which has been previously stented. LCx provides epicardial collaterals to the RCA. LCx ok. LVEF mildly reduced. 3. Ischemic cardiomyopathy  4. Diabetes  5. Hypertension  6. Hyperlipidemia  7. Tobacco abuse  8. Usual Cardiologist:  Dr. Paniagua Ekaterina:     1. Unrelenting pain in the ER  2. NPO  3. I have recommended diagnostic cath for definitive evaluation of the patient's coronary anatomy and PCI if appropriate. All risks, benefits, and alternatives were discussed and the patient wishes to proceed. 4. Continue heparin gtt  5. Redefine anatomy. May attempt to open RCA graft. No plans for RCA  attempt after hours/ad hoc. May need support. 6. Continue ASA  7. Continue prasugrel; consider change to ticagrelor  8. Continue bystolic  9. Continue Entresto  10. Continue ranexa    Thank you for this consult and allowing me to take part in this patients care. Please call with questions. [x]        High complexity decision making was performed        Subjective:   CHIEF COMPLAINT: CP    HISTORY OF PRESENT ILLNESS:     Ronel Moser is a 57 y.o.  male who \"On Monday of this week, the patient presented with left anterior chest pain and a nonacute EKG. His troponin was noted to be 17. He had had pain intermittently throughout the weekend. He was found to have 2 occluded vessels with thrombus and was restented. There was placed on Effient and baby aspirin which is been taking.   Today around 715 he started with left anterior chest pain similar to what he had on Monday and was brought by his wife to the emergency department where he arrived around 730-julio. He was placed in the waiting room apparently and was there for a period of time before he was brought to the back. He was still complaining of pain. Hemodynamically he was stable. There was some shortness of breath. \"      We were asked to consult for work up and evaluation of the above problems. Past Medical History:   Diagnosis Date    Diabetes ([address]     Glaucoma     right eye    Hypercholesteremia     Hypertension     Kidney stones     MI, old       Past Surgical History:   Procedure Laterality Date    HX CORONARY ARTERY BYPASS GRAFT      HX CORONARY STENT PLACEMENT      HX WISDOM TEETH EXTRACTION       Allergies   Allergen Reactions    Tape [Adhesive] Hives      Meds:  See below  Social History     Tobacco Use    Smoking status: Current Every Day Smoker    Smokeless tobacco: Never Used   Substance Use Topics    Alcohol use: Yes      No family history on file. REVIEW OF SYSTEMS:     []         Unable to obtain  ROS due to ---   [x]         Total of 12 systems reviewed as follows:    Constitutional: negative fever, negative chills, negative weight loss  Eyes:   negative visual changes  ENT:   negative sore throat, tongue or lip swelling  Respiratory:  negative cough, negative dyspnea  Cards:  negative for chest pain, palpitations, lower extremity edema  GI:   negative for nausea, vomiting, diarrhea, and abdominal pain  Genitourinary: negative for frequency, dysuria  Integument:  negative for rash   Hematologic:  negative for easy bruising and gum/nose bleeding  Musculoskel: negative for myalgias,  back pain  Neurological:  negative for headaches, dizziness, vertigo, weakness  Behavl/Psych: negative for feelings of anxiety, depression     Pertinent Positives include :    Objective:      Physical Exam:    Last 24hrs VS reviewed since prior progress note.  Most recent are:    Visit Vitals  /66 (BP 1 Location: Left upper arm)   Pulse 75 Temp 98.1 °F (36.7 °C)   Resp 17   Wt 110 kg (242 lb 9.6 oz)   SpO2 93%   BMI 32.01 kg/m²     No intake or output data in the 24 hours ending 05/07/21 2240     General Appearance: Well developed, well nourished, alert & oriented x 3,    no acute distress. Ears/Nose/Mouth/Throat: Pupils equal and round, Hearing grossly normal.  Neck: Supple. JVP within normal limits. Carotids good upstrokes, with no bruit. Chest: Lungs clear to auscultation bilaterally. Cardiovascular: Regular rate and rhythm, S1S2 normal, no murmur, rubs, gallops. Abdomen: Soft, non-tender, bowel sounds are active. No organomegaly. Extremities: No edema bilaterally. Femoral pulses +2, Distal Pulses +1. Skin: Warm and dry. Neuro: CN II-XII grossly intact, Strength and sensation grossly intact. []         Post-cath site without hematoma, bruit, tenderness, or thrill. Distal pulses intact. Data:      Telemetry:  SR  EKG:  SR, RBBB, Lt Ax, IWMI  []  No new EKG for review. Prior to Admission medications    Medication Sig Start Date End Date Taking? Authorizing Provider   evolocumab (Repatha Pushtronex) 420 mg/3.5 mL Injt 10 mg by SubCUTAneous route every thirty (30) days. Every 2 weeks on Sunday; next dose due 5/20/18 5/4/21   Eulalio Holter, MD   ranolazine ER (Ranexa) 500 mg SR tablet Take 1 Tab by mouth two (2) times a day. 5/4/21   Eulalio Holter, MD   furosemide (Lasix) 40 mg tablet Take 1 Tab by mouth two (2) times a day. 5/4/21   Eulalio Holter, MD   cholecalciferol, vitamin D3, (VITAMIN D3) 2,000 unit tab Take 2,000 Units by mouth daily. Provider, Historical   multivits,Stress Formula-Zinc tablet Take 1 Tab by mouth daily. Provider, Historical   sacubitril-valsartan (ENTRESTO) 24 mg/26 mg tablet Take 1 Tab by mouth every twelve (12) hours. 6/7/18   Chris Kelly MD   insulin glargine (LANTUS,BASAGLAR) 100 unit/mL (3 mL) inpn 85 Units by SubCUTAneous route nightly.     Provider, Historical dapagliflozin (FARXIGA) 10 mg tab tablet Take 10 mg by mouth daily. Provider, Tony   aspirin delayed-release 81 mg tablet Take 1 Tab by mouth daily. 10/25/16   Teresa Alexander MD   prasugrel (EFFIENT) 10 mg tablet Take 1 Tab by mouth nightly. 10/25/16   Teresa Alexander MD   nebivolol (BYSTOLIC) 5 mg tablet Take 5 mg by mouth every evening. OtherIlene MD   DULoxetine (CYMBALTA) 30 mg capsule Take 30 mg by mouth daily. Ilene Catherine MD   glipiZIDE SR (GLUCOTROL XL) 5 mg CR tablet Take 10 mg by mouth Before breakfast and dinner. Ilene Catherine MD       Recent Results (from the past 24 hour(s))   EKG, 12 LEAD, INITIAL    Collection Time: 05/07/21  7:50 PM   Result Value Ref Range    Ventricular Rate 83 BPM    Atrial Rate 83 BPM    P-R Interval 216 ms    QRS Duration 174 ms    Q-T Interval 466 ms    QTC Calculation (Bezet) 547 ms    Calculated P Axis 62 degrees    Calculated R Axis -36 degrees    Calculated T Axis -7 degrees    Diagnosis       Sinus rhythm with 1st degree AV block  Left axis deviation  Right bundle branch block  Inferior infarct (cited on or before 07-JUN-2018)  Anterolateral infarct , age undetermined  When compared with ECG of 03-MAY-2021 09:24,  Significant changes have occurred     SAMPLES BEING HELD    Collection Time: 05/07/21  8:00 PM   Result Value Ref Range    SAMPLES BEING HELD 1RED     COMMENT        Add-on orders for these samples will be processed based on acceptable specimen integrity and analyte stability, which may vary by analyte.    METABOLIC PANEL, COMPREHENSIVE    Collection Time: 05/07/21  8:00 PM   Result Value Ref Range    Sodium 139 136 - 145 mmol/L    Potassium 3.2 (L) 3.5 - 5.1 mmol/L    Chloride 104 97 - 108 mmol/L    CO2 28 21 - 32 mmol/L    Anion gap 7 5 - 15 mmol/L    Glucose 201 (H) 65 - 100 mg/dL    BUN 17 6 - 20 MG/DL    Creatinine 0.93 0.70 - 1.30 MG/DL    BUN/Creatinine ratio 18 12 - 20      GFR est AA >60 >60 ml/min/1.73m2    GFR est non-AA >60 >60 ml/min/1.73m2    Calcium 9.0 8.5 - 10.1 MG/DL    Bilirubin, total 0.4 0.2 - 1.0 MG/DL    ALT (SGPT) 32 12 - 78 U/L    AST (SGOT) 21 15 - 37 U/L    Alk. phosphatase 100 45 - 117 U/L    Protein, total 7.6 6.4 - 8.2 g/dL    Albumin 3.8 3.5 - 5.0 g/dL    Globulin 3.8 2.0 - 4.0 g/dL    A-G Ratio 1.0 (L) 1.1 - 2.2     CBC WITH AUTOMATED DIFF    Collection Time: 05/07/21  8:00 PM   Result Value Ref Range    WBC 8.9 4.1 - 11.1 K/uL    RBC 5.28 4.10 - 5.70 M/uL    HGB 15.9 12.1 - 17.0 g/dL    HCT 46.9 36.6 - 50.3 %    MCV 88.8 80.0 - 99.0 FL    MCH 30.1 26.0 - 34.0 PG    MCHC 33.9 30.0 - 36.5 g/dL    RDW 13.0 11.5 - 14.5 %    PLATELET 372 884 - 122 K/uL    MPV 10.1 8.9 - 12.9 FL    NRBC 0.0 0  WBC    ABSOLUTE NRBC 0.00 0.00 - 0.01 K/uL    NEUTROPHILS 52 32 - 75 %    LYMPHOCYTES 38 12 - 49 %    MONOCYTES 6 5 - 13 %    EOSINOPHILS 4 0 - 7 %    BASOPHILS 0 0 - 1 %    IMMATURE GRANULOCYTES 0 0.0 - 0.5 %    ABS. NEUTROPHILS 4.6 1.8 - 8.0 K/UL    ABS. LYMPHOCYTES 3.4 0.8 - 3.5 K/UL    ABS. MONOCYTES 0.6 0.0 - 1.0 K/UL    ABS. EOSINOPHILS 0.3 0.0 - 0.4 K/UL    ABS. BASOPHILS 0.0 0.0 - 0.1 K/UL    ABS. IMM.  GRANS. 0.0 0.00 - 0.04 K/UL    DF AUTOMATED     TROPONIN I    Collection Time: 05/07/21  8:00 PM   Result Value Ref Range    Troponin-I, Qt. 1.99 (H) <0.05 ng/mL   DRUG SCREEN, URINE    Collection Time: 05/07/21  9:53 PM   Result Value Ref Range    AMPHETAMINES Negative NEG      BARBITURATES Negative NEG      BENZODIAZEPINES Negative NEG      COCAINE Negative NEG      METHADONE Negative NEG      OPIATES Negative NEG      PCP(PHENCYCLIDINE) Negative NEG      THC (TH-CANNABINOL) Negative NEG      Drug screen comment (NOTE)         Sterling Busby III, DO

## 2021-05-08 NOTE — ED PROVIDER NOTES
This is a 57-year-old male with a history of coronary artery disease, diabetes and hyperlipidemia. He also has a history of hypertension. On Monday of this week, the patient presented with left anterior chest pain and a nonacute EKG. His troponin was noted to be 17. He had had pain intermittently throughout the weekend. He was found to have 2 occluded vessels with thrombus and was restented. There was placed on Effient and baby aspirin which is been taking. Today around 715 he started with left anterior chest pain similar to what he had on Monday and was brought by his wife to the emergency department where he arrived around 730-julio. He was placed in the waiting room apparently and was there for a period of time before he was brought to the back. He was still complaining of pain. Hemodynamically he was stable. There was some shortness of breath. Past Medical History:   Diagnosis Date    Diabetes ([address]     Glaucoma     right eye    Hypercholesteremia     Hypertension     Kidney stones     MI, old        Past Surgical History:   Procedure Laterality Date    HX CORONARY ARTERY BYPASS GRAFT      HX CORONARY STENT PLACEMENT      HX WISDOM TEETH EXTRACTION           No family history on file. Social History     Socioeconomic History    Marital status:      Spouse name: Not on file    Number of children: Not on file    Years of education: Not on file    Highest education level: Not on file   Occupational History    Not on file   Social Needs    Financial resource strain: Not on file    Food insecurity     Worry: Not on file     Inability: Not on file    Transportation needs     Medical: Not on file     Non-medical: Not on file   Tobacco Use    Smoking status: Current Every Day Smoker    Smokeless tobacco: Never Used   Substance and Sexual Activity    Alcohol use:  Yes    Drug use: No    Sexual activity: Not on file   Lifestyle    Physical activity     Days per week: Not on file     Minutes per session: Not on file    Stress: Not on file   Relationships    Social connections     Talks on phone: Not on file     Gets together: Not on file     Attends Religion service: Not on file     Active member of club or organization: Not on file     Attends meetings of clubs or organizations: Not on file     Relationship status: Not on file    Intimate partner violence     Fear of current or ex partner: Not on file     Emotionally abused: Not on file     Physically abused: Not on file     Forced sexual activity: Not on file   Other Topics Concern    Not on file   Social History Narrative    Not on file         ALLERGIES: Tape [adhesive]    Review of Systems   Constitutional: Negative for activity change, appetite change, chills, fatigue and fever. HENT: Negative for ear pain, facial swelling, sore throat and trouble swallowing. Eyes: Negative for pain, discharge and visual disturbance. Respiratory: Positive for shortness of breath. Negative for chest tightness and wheezing. Cardiovascular: Positive for chest pain. Negative for palpitations. Gastrointestinal: Negative for abdominal pain, blood in stool, nausea and vomiting. Genitourinary: Negative for difficulty urinating, flank pain and hematuria. Musculoskeletal: Negative for arthralgias, joint swelling, myalgias and neck pain. Skin: Negative for color change and rash. Neurological: Negative for dizziness, weakness, numbness and headaches. Hematological: Negative for adenopathy. Does not bruise/bleed easily. Psychiatric/Behavioral: Negative for behavioral problems, confusion and sleep disturbance. All other systems reviewed and are negative. Vitals:    05/07/21 1944 05/07/21 2054   BP: (!) 140/90    Pulse: 83    Resp: 16    Temp: 98.1 °F (36.7 °C)    SpO2: 95% 97%            Physical Exam  Vitals signs and nursing note reviewed.    Constitutional:       General: He is in acute distress ( With left anterior chest pain. ). Appearance: He is well-developed. He is obese. He is ill-appearing. HENT:      Head: Normocephalic and atraumatic. Nose: Nose normal.   Eyes:      General: No scleral icterus. Conjunctiva/sclera: Conjunctivae normal.      Pupils: Pupils are equal, round, and reactive to light. Neck:      Musculoskeletal: Normal range of motion and neck supple. Thyroid: No thyromegaly. Vascular: No JVD. Trachea: No tracheal deviation. Comments: No carotid bruits noted. Cardiovascular:      Rate and Rhythm: Normal rate and regular rhythm. Heart sounds: Normal heart sounds. No murmur. No friction rub. No gallop. Pulmonary:      Effort: Pulmonary effort is normal. No respiratory distress. Breath sounds: Normal breath sounds. No wheezing or rales. Chest:      Chest wall: No tenderness. Abdominal:      General: Bowel sounds are normal. There is no distension. Palpations: Abdomen is soft. There is no mass. Tenderness: There is no abdominal tenderness. There is no guarding or rebound. Musculoskeletal: Normal range of motion. General: No tenderness. Lymphadenopathy:      Cervical: No cervical adenopathy. Skin:     General: Skin is warm and dry. Capillary Refill: Capillary refill takes 2 to 3 seconds. Findings: No erythema or rash. Neurological:      Mental Status: He is alert and oriented to person, place, and time. Cranial Nerves: No cranial nerve deficit. Coordination: Coordination normal.      Deep Tendon Reflexes: Reflexes are normal and symmetric. Psychiatric:         Behavior: Behavior normal.         Thought Content:  Thought content normal.         Judgment: Judgment normal.          MDM  Number of Diagnoses or Management Options     Amount and/or Complexity of Data Reviewed  Clinical lab tests: ordered and reviewed  Tests in the radiology section of CPT®: ordered and reviewed  Decide to obtain previous medical records or to obtain history from someone other than the patient: yes  Review and summarize past medical records: yes  Discuss the patient with other providers: yes  Independent visualization of images, tracings, or specimens: yes           Procedures      9:05 PM  Troponin not returned as yet. Patient's EKG fails to demonstrate any acute change from May 3. He is having left-sided chest pain. He describes it as moderately severe. This is a 50-year-old male with a history of type 2 diabetes and history of CABG with coronary artery disease. He presented on the third of this month with left anterior chest pain that radiated into his arm. He had had symptoms like this previously with angina. He was diagnosed with a non-STEMI and had a troponin of 17. He was taken to the Cath Lab at that time and the previously stented SVG and RCA were 100% occluded with clot. He had been doing well since discharge. Today he was shopping with his wife around 7:00 to 36 and started developing a left anterior chest pain. She put him in the car and drove him directly to the hospital.  He was in the waiting room for a while before being brought back. The pain is persisted. He has had no nausea vomiting. There is been no sweating. Has had some mild shortness of breath. He has been taking all of his medicines as directed. There is been no fever or chill. The pain is persisting at this time. He has taken none of his medicines this evening. The patient's troponin is just reported back at 1.9. I have spoken with Dr. Rosales Driscoll and he is coming in to see this patient given his history. The patient is being placed on a heparin drip and given medications for pain and nausea. Hemodynamically he is remaining stable. Head checked on the patient at 930  I have placed a call to cardiac intervention for Dr. Toscano which was the wife's expectation. I have also sent that expectation to Dr. Rigoberto Hooper by perfect serve.  9:52 PM    10:02 PM  Dr. Randee Molina has responded to the perfect serve message sent. He has talked to the interventional list on for their group. They are not concerned about the troponin as its decreasing from the elevation he had on Monday. But given this presentation being similar to Monday and the expectation that he is going to the Cath Lab, the interventionalists will come in. The nursing supervisor has been called and cards will take the patient to the Cath Lab. He has gotten sublingual nitro and IV nitro. He is placed on Heparin and given additional morphine. He has been given Lopressor and hemodynamically he is stable. Still complains of pain. 10:18 PM the patient states his pain is no better. The heparin IV is going. He has had to have morphine and he has had sublingual nitro. Tridil is not yet going in although it has been ordered. We will give another sublingual nitro and if no improvement will add morphine. I have instructed the patient and the wife that the cath team is coming in to take him to the Cath Lab. The patient was taken from the ED to the cath lab.

## 2021-05-09 ENCOUNTER — APPOINTMENT (OUTPATIENT)
Dept: GENERAL RADIOLOGY | Age: 61
DRG: 246 | End: 2021-05-09
Attending: EMERGENCY MEDICINE
Payer: COMMERCIAL

## 2021-05-09 ENCOUNTER — HOSPITAL ENCOUNTER (INPATIENT)
Age: 61
LOS: 5 days | Discharge: HOME | DRG: 246 | End: 2021-05-14
Attending: EMERGENCY MEDICINE | Admitting: INTERNAL MEDICINE
Payer: COMMERCIAL

## 2021-05-09 VITALS
RESPIRATION RATE: 17 BRPM | BODY MASS INDEX: 32.29 KG/M2 | TEMPERATURE: 98.3 F | SYSTOLIC BLOOD PRESSURE: 116 MMHG | OXYGEN SATURATION: 95 % | HEART RATE: 74 BPM | DIASTOLIC BLOOD PRESSURE: 53 MMHG | WEIGHT: 244.71 LBS

## 2021-05-09 DIAGNOSIS — E11.59 CONTROLLED TYPE 2 DIABETES MELLITUS WITH OTHER CIRCULATORY COMPLICATION, WITH LONG-TERM CURRENT USE OF INSULIN (HCC): ICD-10-CM

## 2021-05-09 DIAGNOSIS — I25.110 CORONARY ARTERY DISEASE WITH UNSTABLE ANGINA PECTORIS, UNSPECIFIED VESSEL OR LESION TYPE, UNSPECIFIED WHETHER NATIVE OR TRANSPLANTED HEART (HCC): ICD-10-CM

## 2021-05-09 DIAGNOSIS — R07.9 ACUTE CHEST PAIN: Primary | ICD-10-CM

## 2021-05-09 DIAGNOSIS — Z79.4 CONTROLLED TYPE 2 DIABETES MELLITUS WITH OTHER CIRCULATORY COMPLICATION, WITH LONG-TERM CURRENT USE OF INSULIN (HCC): ICD-10-CM

## 2021-05-09 DIAGNOSIS — I21.3 ST ELEVATION (STEMI) MYOCARDIAL INFARCTION (HCC): ICD-10-CM

## 2021-05-09 PROBLEM — I25.700 CORONARY ARTERY DISEASE INVOLVING CORONARY BYPASS GRAFT OF NATIVE HEART WITH UNSTABLE ANGINA PECTORIS (HCC): Status: ACTIVE | Noted: 2021-05-09

## 2021-05-09 LAB
ALBUMIN SERPL-MCNC: 2.9 G/DL (ref 3.5–5)
ALBUMIN SERPL-MCNC: 3.5 G/DL (ref 3.5–5)
ALBUMIN/GLOB SERPL: 0.9 {RATIO} (ref 1.1–2.2)
ALBUMIN/GLOB SERPL: 0.9 {RATIO} (ref 1.1–2.2)
ALP SERPL-CCNC: 74 U/L (ref 45–117)
ALP SERPL-CCNC: 86 U/L (ref 45–117)
ALT SERPL-CCNC: 23 U/L (ref 12–78)
ALT SERPL-CCNC: 26 U/L (ref 12–78)
ANION GAP SERPL CALC-SCNC: 4 MMOL/L (ref 5–15)
ANION GAP SERPL CALC-SCNC: 5 MMOL/L (ref 5–15)
APTT PPP: 25 SEC (ref 22.1–31)
AST SERPL-CCNC: 14 U/L (ref 15–37)
AST SERPL-CCNC: 15 U/L (ref 15–37)
ATRIAL RATE: 78 BPM
ATRIAL RATE: 83 BPM
BASOPHILS # BLD: 0 K/UL (ref 0–0.1)
BASOPHILS # BLD: 0.1 K/UL (ref 0–0.1)
BASOPHILS NFR BLD: 1 % (ref 0–1)
BASOPHILS NFR BLD: 1 % (ref 0–1)
BILIRUB SERPL-MCNC: 0.4 MG/DL (ref 0.2–1)
BILIRUB SERPL-MCNC: 0.5 MG/DL (ref 0.2–1)
BNP SERPL-MCNC: 848 PG/ML
BUN SERPL-MCNC: 17 MG/DL (ref 6–20)
BUN SERPL-MCNC: 17 MG/DL (ref 6–20)
BUN/CREAT SERPL: 17 (ref 12–20)
BUN/CREAT SERPL: 22 (ref 12–20)
CALCIUM SERPL-MCNC: 8.3 MG/DL (ref 8.5–10.1)
CALCIUM SERPL-MCNC: 8.9 MG/DL (ref 8.5–10.1)
CALCULATED P AXIS, ECG09: 54 DEGREES
CALCULATED P AXIS, ECG09: 62 DEGREES
CALCULATED R AXIS, ECG10: -36 DEGREES
CALCULATED R AXIS, ECG10: 27 DEGREES
CALCULATED T AXIS, ECG11: -7 DEGREES
CALCULATED T AXIS, ECG11: 45 DEGREES
CHLORIDE SERPL-SCNC: 106 MMOL/L (ref 97–108)
CHLORIDE SERPL-SCNC: 109 MMOL/L (ref 97–108)
CO2 SERPL-SCNC: 26 MMOL/L (ref 21–32)
CO2 SERPL-SCNC: 30 MMOL/L (ref 21–32)
COMMENT, HOLDF: NORMAL
CREAT SERPL-MCNC: 0.77 MG/DL (ref 0.7–1.3)
CREAT SERPL-MCNC: 0.98 MG/DL (ref 0.7–1.3)
DIAGNOSIS, 93000: NORMAL
DIAGNOSIS, 93000: NORMAL
DIFFERENTIAL METHOD BLD: NORMAL
DIFFERENTIAL METHOD BLD: NORMAL
EOSINOPHIL # BLD: 0.3 K/UL (ref 0–0.4)
EOSINOPHIL # BLD: 0.4 K/UL (ref 0–0.4)
EOSINOPHIL NFR BLD: 3 % (ref 0–7)
EOSINOPHIL NFR BLD: 4 % (ref 0–7)
ERYTHROCYTE [DISTWIDTH] IN BLOOD BY AUTOMATED COUNT: 12.9 % (ref 11.5–14.5)
ERYTHROCYTE [DISTWIDTH] IN BLOOD BY AUTOMATED COUNT: 13 % (ref 11.5–14.5)
GLOBULIN SER CALC-MCNC: 3.4 G/DL (ref 2–4)
GLOBULIN SER CALC-MCNC: 3.7 G/DL (ref 2–4)
GLUCOSE BLD STRIP.AUTO-MCNC: 119 MG/DL (ref 65–100)
GLUCOSE SERPL-MCNC: 134 MG/DL (ref 65–100)
GLUCOSE SERPL-MCNC: 86 MG/DL (ref 65–100)
HCT VFR BLD AUTO: 42.3 % (ref 36.6–50.3)
HCT VFR BLD AUTO: 44.6 % (ref 36.6–50.3)
HGB BLD-MCNC: 13.8 G/DL (ref 12.1–17)
HGB BLD-MCNC: 14.7 G/DL (ref 12.1–17)
IMM GRANULOCYTES # BLD AUTO: 0 K/UL (ref 0–0.04)
IMM GRANULOCYTES # BLD AUTO: 0 K/UL (ref 0–0.04)
IMM GRANULOCYTES NFR BLD AUTO: 0 % (ref 0–0.5)
IMM GRANULOCYTES NFR BLD AUTO: 0 % (ref 0–0.5)
INR PPP: 1.1 (ref 0.9–1.1)
LYMPHOCYTES # BLD: 3.3 K/UL (ref 0.8–3.5)
LYMPHOCYTES # BLD: 3.3 K/UL (ref 0.8–3.5)
LYMPHOCYTES NFR BLD: 32 % (ref 12–49)
LYMPHOCYTES NFR BLD: 38 % (ref 12–49)
MCH RBC QN AUTO: 29.8 PG (ref 26–34)
MCH RBC QN AUTO: 30.1 PG (ref 26–34)
MCHC RBC AUTO-ENTMCNC: 32.6 G/DL (ref 30–36.5)
MCHC RBC AUTO-ENTMCNC: 33 G/DL (ref 30–36.5)
MCV RBC AUTO: 91.4 FL (ref 80–99)
MCV RBC AUTO: 91.4 FL (ref 80–99)
MONOCYTES # BLD: 0.6 K/UL (ref 0–1)
MONOCYTES # BLD: 0.6 K/UL (ref 0–1)
MONOCYTES NFR BLD: 6 % (ref 5–13)
MONOCYTES NFR BLD: 6 % (ref 5–13)
NEUTS SEG # BLD: 4.6 K/UL (ref 1.8–8)
NEUTS SEG # BLD: 6 K/UL (ref 1.8–8)
NEUTS SEG NFR BLD: 51 % (ref 32–75)
NEUTS SEG NFR BLD: 58 % (ref 32–75)
NRBC # BLD: 0 K/UL (ref 0–0.01)
NRBC # BLD: 0 K/UL (ref 0–0.01)
NRBC BLD-RTO: 0 PER 100 WBC
NRBC BLD-RTO: 0 PER 100 WBC
P-R INTERVAL, ECG05: 216 MS
P-R INTERVAL, ECG05: 284 MS
PLATELET # BLD AUTO: 248 K/UL (ref 150–400)
PLATELET # BLD AUTO: 312 K/UL (ref 150–400)
PMV BLD AUTO: 9.5 FL (ref 8.9–12.9)
PMV BLD AUTO: 9.5 FL (ref 8.9–12.9)
POTASSIUM SERPL-SCNC: 3.4 MMOL/L (ref 3.5–5.1)
POTASSIUM SERPL-SCNC: 3.7 MMOL/L (ref 3.5–5.1)
PROT SERPL-MCNC: 6.3 G/DL (ref 6.4–8.2)
PROT SERPL-MCNC: 7.2 G/DL (ref 6.4–8.2)
PROTHROMBIN TIME: 11.1 SEC (ref 9–11.1)
Q-T INTERVAL, ECG07: 448 MS
Q-T INTERVAL, ECG07: 466 MS
QRS DURATION, ECG06: 146 MS
QRS DURATION, ECG06: 174 MS
QTC CALCULATION (BEZET), ECG08: 510 MS
QTC CALCULATION (BEZET), ECG08: 547 MS
RBC # BLD AUTO: 4.63 M/UL (ref 4.1–5.7)
RBC # BLD AUTO: 4.88 M/UL (ref 4.1–5.7)
SAMPLES BEING HELD,HOLD: NORMAL
SERVICE CMNT-IMP: ABNORMAL
SODIUM SERPL-SCNC: 139 MMOL/L (ref 136–145)
SODIUM SERPL-SCNC: 141 MMOL/L (ref 136–145)
THERAPEUTIC RANGE,PTTT: NORMAL SECS (ref 58–77)
TROPONIN I SERPL-MCNC: 2.47 NG/ML
VENTRICULAR RATE, ECG03: 78 BPM
VENTRICULAR RATE, ECG03: 83 BPM
WBC # BLD AUTO: 10.3 K/UL (ref 4.1–11.1)
WBC # BLD AUTO: 8.9 K/UL (ref 4.1–11.1)

## 2021-05-09 PROCEDURE — 65610000006 HC RM INTENSIVE CARE

## 2021-05-09 PROCEDURE — 80053 COMPREHEN METABOLIC PANEL: CPT

## 2021-05-09 PROCEDURE — 99285 EMERGENCY DEPT VISIT HI MDM: CPT

## 2021-05-09 PROCEDURE — 74011250636 HC RX REV CODE- 250/636: Performed by: EMERGENCY MEDICINE

## 2021-05-09 PROCEDURE — 74011250637 HC RX REV CODE- 250/637: Performed by: INTERNAL MEDICINE

## 2021-05-09 PROCEDURE — 85610 PROTHROMBIN TIME: CPT

## 2021-05-09 PROCEDURE — 84484 ASSAY OF TROPONIN QUANT: CPT

## 2021-05-09 PROCEDURE — 85730 THROMBOPLASTIN TIME PARTIAL: CPT

## 2021-05-09 PROCEDURE — 93005 ELECTROCARDIOGRAM TRACING: CPT

## 2021-05-09 PROCEDURE — 85025 COMPLETE CBC W/AUTO DIFF WBC: CPT

## 2021-05-09 PROCEDURE — 36415 COLL VENOUS BLD VENIPUNCTURE: CPT

## 2021-05-09 PROCEDURE — 94760 N-INVAS EAR/PLS OXIMETRY 1: CPT

## 2021-05-09 PROCEDURE — 82962 GLUCOSE BLOOD TEST: CPT

## 2021-05-09 PROCEDURE — 74011000250 HC RX REV CODE- 250: Performed by: EMERGENCY MEDICINE

## 2021-05-09 PROCEDURE — 83880 ASSAY OF NATRIURETIC PEPTIDE: CPT

## 2021-05-09 PROCEDURE — 71045 X-RAY EXAM CHEST 1 VIEW: CPT

## 2021-05-09 RX ORDER — METOPROLOL SUCCINATE 50 MG/1
50 TABLET, EXTENDED RELEASE ORAL DAILY
Status: DISCONTINUED | OUTPATIENT
Start: 2021-05-10 | End: 2021-05-14 | Stop reason: HOSPADM

## 2021-05-09 RX ORDER — INSULIN GLARGINE 100 [IU]/ML
45 INJECTION, SOLUTION SUBCUTANEOUS
Status: DISCONTINUED | OUTPATIENT
Start: 2021-05-10 | End: 2021-05-13

## 2021-05-09 RX ORDER — RANOLAZINE 500 MG/1
500 TABLET, EXTENDED RELEASE ORAL 2 TIMES DAILY
Refills: 5 | Status: DISCONTINUED | OUTPATIENT
Start: 2021-05-09 | End: 2021-05-10

## 2021-05-09 RX ORDER — NITROGLYCERIN 20 MG/100ML
0-200 INJECTION INTRAVENOUS
Status: DISCONTINUED | OUTPATIENT
Start: 2021-05-09 | End: 2021-05-12

## 2021-05-09 RX ORDER — HEPARIN SODIUM 5000 [USP'U]/ML
4000 INJECTION, SOLUTION INTRAVENOUS; SUBCUTANEOUS ONCE
Status: COMPLETED | OUTPATIENT
Start: 2021-05-09 | End: 2021-05-09

## 2021-05-09 RX ORDER — ASPIRIN 81 MG/1
81 TABLET ORAL DAILY
Status: DISCONTINUED | OUTPATIENT
Start: 2021-05-10 | End: 2021-05-14 | Stop reason: HOSPADM

## 2021-05-09 RX ORDER — MELATONIN
2000 DAILY
Status: DISCONTINUED | OUTPATIENT
Start: 2021-05-10 | End: 2021-05-14 | Stop reason: HOSPADM

## 2021-05-09 RX ORDER — ACETAMINOPHEN 325 MG/1
650 TABLET ORAL
Status: DISCONTINUED | OUTPATIENT
Start: 2021-05-09 | End: 2021-05-14 | Stop reason: HOSPADM

## 2021-05-09 RX ORDER — SODIUM CHLORIDE 0.9 % (FLUSH) 0.9 %
5-40 SYRINGE (ML) INJECTION AS NEEDED
Status: DISCONTINUED | OUTPATIENT
Start: 2021-05-09 | End: 2021-05-14 | Stop reason: HOSPADM

## 2021-05-09 RX ORDER — HEPARIN SODIUM 10000 [USP'U]/100ML
9-25 INJECTION, SOLUTION INTRAVENOUS
Status: DISCONTINUED | OUTPATIENT
Start: 2021-05-09 | End: 2021-05-12

## 2021-05-09 RX ORDER — POLYETHYLENE GLYCOL 3350 17 G/17G
17 POWDER, FOR SOLUTION ORAL DAILY PRN
Status: DISCONTINUED | OUTPATIENT
Start: 2021-05-09 | End: 2021-05-14 | Stop reason: HOSPADM

## 2021-05-09 RX ORDER — SODIUM CHLORIDE 0.9 % (FLUSH) 0.9 %
5-40 SYRINGE (ML) INJECTION EVERY 8 HOURS
Status: DISCONTINUED | OUTPATIENT
Start: 2021-05-09 | End: 2021-05-14 | Stop reason: HOSPADM

## 2021-05-09 RX ORDER — PROMETHAZINE HYDROCHLORIDE 25 MG/1
12.5 TABLET ORAL
Status: DISCONTINUED | OUTPATIENT
Start: 2021-05-09 | End: 2021-05-14 | Stop reason: HOSPADM

## 2021-05-09 RX ORDER — ONDANSETRON 2 MG/ML
4 INJECTION INTRAMUSCULAR; INTRAVENOUS
Status: DISCONTINUED | OUTPATIENT
Start: 2021-05-09 | End: 2021-05-14 | Stop reason: HOSPADM

## 2021-05-09 RX ORDER — MORPHINE SULFATE 4 MG/ML
4 INJECTION INTRAVENOUS
Status: DISCONTINUED | OUTPATIENT
Start: 2021-05-09 | End: 2021-05-12 | Stop reason: SDUPTHER

## 2021-05-09 RX ORDER — DULOXETIN HYDROCHLORIDE 30 MG/1
30 CAPSULE, DELAYED RELEASE ORAL DAILY
Status: DISCONTINUED | OUTPATIENT
Start: 2021-05-10 | End: 2021-05-14 | Stop reason: HOSPADM

## 2021-05-09 RX ORDER — ACETAMINOPHEN 650 MG/1
650 SUPPOSITORY RECTAL
Status: DISCONTINUED | OUTPATIENT
Start: 2021-05-09 | End: 2021-05-14 | Stop reason: HOSPADM

## 2021-05-09 RX ORDER — GLIPIZIDE 5 MG/1
10 TABLET ORAL
Status: DISCONTINUED | OUTPATIENT
Start: 2021-05-10 | End: 2021-05-11

## 2021-05-09 RX ORDER — INSULIN GLARGINE 100 [IU]/ML
40 INJECTION, SOLUTION SUBCUTANEOUS
Status: DISCONTINUED | OUTPATIENT
Start: 2021-05-10 | End: 2021-05-13

## 2021-05-09 RX ADMIN — TICAGRELOR 90 MG: 90 TABLET ORAL at 09:19

## 2021-05-09 RX ADMIN — Medication 10 ML: at 07:16

## 2021-05-09 RX ADMIN — TICAGRELOR 90 MG: 90 TABLET ORAL at 23:54

## 2021-05-09 RX ADMIN — NITROGLYCERIN 10 MCG/MIN: 20 INJECTION INTRAVENOUS at 22:56

## 2021-05-09 RX ADMIN — RANOLAZINE 500 MG: 500 TABLET, FILM COATED, EXTENDED RELEASE ORAL at 23:53

## 2021-05-09 RX ADMIN — RANOLAZINE 500 MG: 500 TABLET, EXTENDED RELEASE ORAL at 09:21

## 2021-05-09 RX ADMIN — SACUBITRIL AND VALSARTAN 1 TABLET: 24; 26 TABLET, FILM COATED ORAL at 09:19

## 2021-05-09 RX ADMIN — HEPARIN SODIUM 9 UNITS/KG/HR: 10000 INJECTION, SOLUTION INTRAVENOUS at 23:59

## 2021-05-09 RX ADMIN — FUROSEMIDE 40 MG: 40 TABLET ORAL at 09:19

## 2021-05-09 RX ADMIN — HEPARIN SODIUM 4000 UNITS: 5000 INJECTION INTRAVENOUS; SUBCUTANEOUS at 23:58

## 2021-05-09 RX ADMIN — ASPIRIN 81 MG: 81 TABLET, CHEWABLE ORAL at 09:19

## 2021-05-09 RX ADMIN — SACUBITRIL AND VALSARTAN 1 TABLET: 24; 26 TABLET, FILM COATED ORAL at 23:52

## 2021-05-09 NOTE — PROGRESS NOTES
1930; Bedside shift change report given to Shelly Valencia (oncoming nurse) by Omar Orona (offgoing nurse). Report included the following information SBAR, Kardex, Intake/Output, MAR, Recent Results, and Cardiac Rhythm NSR . Problem: Pressure Injury - Risk of  Goal: *Prevention of pressure injury  Description: Document Carlos Scale and appropriate interventions in the flowsheet. Outcome: Progressing Towards Goal  Note: Pressure Injury Interventions:  Sensory Interventions: Keep linens dry and wrinkle-free         Activity Interventions: Pressure redistribution bed/mattress(bed type)    Mobility Interventions: Pressure redistribution bed/mattress (bed type)    Nutrition Interventions: Discuss nutritional consult with provider                     Problem: Falls - Risk of  Goal: *Absence of Falls  Description: Document Kecia Fall Risk and appropriate interventions in the flowsheet.   Outcome: Progressing Towards Goal  Note: Fall Risk Interventions:            Medication Interventions: Teach patient to arise slowly    Elimination Interventions: Call light in reach

## 2021-05-09 NOTE — DISCHARGE SUMMARY
Cardiology Discharge Summary     Patient ID:  Johana Musa  [id number]  32 y.o.  1960    Allergies: Tape [adhesive]    Admit Date: 5/7/2021    Discharge Date: 5/9/2021     Admitting Physician: Donita Fitch MD     Discharge Physician: Donita Fitch MD    * Admission Diagnoses: Chest pain [R07.9]    * Discharge Diagnoses:   Hospital Problems as of 5/9/2021 Never Reviewed          Codes Class Noted - Resolved POA    Chest pain ICD-10-CM: R07.9  ICD-9-CM: 786.50  5/7/2021 - Present Unknown              * Discharge Condition: improved    * Cardiology Procedures this Admission:  Left heart catheterization with PCI      * Hospital Course: Johana Musa is a 57 y.o. male admitted for No admission diagnoses are documented for this encounter. h/o type II diabetes and CAD with prior CABG, who presented to the ER with symptoms of left sided chest pain radiating to the left arm. This was typical of prior anginal symptoms on 5/3/21. Underwent cath which demonstrated previously stented SVG to the RCA was 100% occluded with thrombus and this appeared to be the culprit lesion. His native RCA is a  which fills via left to right collaterals. :He underwent  Stent to the svg. If he has persistent symptoms despite maximally tolerated medical therapy, consideration could be given to RCA  PCI.      Sp emergent cath:  5/7/21  , mechanical thrombectomy, PTCA/PCI of the SVG-RCA w/ 4 SANTOS    Chest pain now resolved     1. Continue ASA  2. Continue prasugrel; consider change to ticagrelor  3. Continue bystolic  4. Continue Entresto  5. Continue ranexa    Consults: None    Discharge Exam:   Physical Exam:  HEENT: [place], EOMI  Neck: No JVD,  No thyroidmegaly  Resp: CTA bilaterally;  No wheezes or rales  CV: RRR s1s2 No murmur no s3  Abd:Soft, Nontender  Ext: No edema  Neuro: Alert and oriented; Nonfocal  Skin: Warm, Dry, Intact  Pulses: 2+ DP/PT/Rad       * Disposition: Home    Discharge Medications:   Current Discharge Medication List      START taking these medications    Details   ticagrelor (BRILINTA) 90 mg tablet Take 1 Tab by mouth every twelve (12) hours every twelve (12) hours. Qty: 180 Tab, Refills: 3  Start date: 5/9/2021         CONTINUE these medications which have NOT CHANGED    Details   diclofenac EC (VOLTAREN) 75 mg EC tablet Take 75 mg by mouth two (2) times daily as needed for Pain. cycloSPORINE (Restasis) 0.05 % dpet Administer 1 Drop to both eyes daily. cyclobenzaprine HCl (FLEXERIL PO) Take 10 mg by mouth three (3) times daily as needed for Pain.      ranolazine ER (Ranexa) 500 mg SR tablet Take 1 Tab by mouth two (2) times a day. Qty: 60 Tab, Refills: 5      furosemide (Lasix) 40 mg tablet Take 1 Tab by mouth two (2) times a day. Qty: 180 Tab, Refills: 3      cholecalciferol, vitamin D3, (VITAMIN D3) 2,000 unit tab Take 2,000 Units by mouth daily. multivits,Stress Formula-Zinc tablet Take 1 Tab by mouth daily. sacubitril-valsartan (ENTRESTO) 24 mg/26 mg tablet Take 1 Tab by mouth every twelve (12) hours. Qty: 60 Tab, Refills: 11      insulin glargine (LANTUS,BASAGLAR) 100 unit/mL (3 mL) inpn 85 Units by SubCUTAneous route nightly. dapagliflozin (FARXIGA) 10 mg tab tablet Take 10 mg by mouth daily. aspirin delayed-release 81 mg tablet Take 1 Tab by mouth daily. Qty: 30 Tab, Refills: 11      nebivolol (BYSTOLIC) 5 mg tablet Take 5 mg by mouth every evening. DULoxetine (CYMBALTA) 30 mg capsule Take 30 mg by mouth daily. glipiZIDE SR (GLUCOTROL XL) 5 mg CR tablet Take 10 mg by mouth Before breakfast and dinner. STOP taking these medications       prasugrel (EFFIENT) 10 mg tablet Comments:   Reason for Stopping:               * Follow-up Care/Patient Instructions:    Activity:No lifting, Driving, or Strenuous exercise for 1 month or as modified as outpt  Diet: Cardiac Diet  Wound Care: None needed    Follow-up Information     Follow up With Specialties Details Why Contact Info    Nely Chavarria MD Family Medicine   [facility] [address]  [phone]            Signed:  Wade Marquez MD  5/9/2021  10:00 AM

## 2021-05-09 NOTE — PROGRESS NOTES
0730: Bedside shift change report given to Chichi Kc RN  (oncoming nurse) by Devin Ochoa (offgoing nurse). Report included the following information SBAR, Kardex, Intake/Output, MAR, Accordion, Recent Results and Cardiac Rhythm NSR.     1100: Discharge medications reviewed with patient and spouse and appropriate educational materials and side effects teaching were provided. I have reviewed discharge instructions with the patient and spouse. The patient and spouse verbalized understanding.

## 2021-05-10 LAB
ACT BLD: 147 SECS (ref 79–138)
ACT BLD: 257 SECS (ref 79–138)
ACT BLD: 290 SECS (ref 79–138)
ANION GAP SERPL CALC-SCNC: 6 MMOL/L (ref 5–15)
APTT PPP: 28.1 SEC (ref 22.1–31)
APTT PPP: 37.9 SEC (ref 22.1–31)
APTT PPP: 46.6 SEC (ref 22.1–31)
ATRIAL RATE: 69 BPM
ATRIAL RATE: 83 BPM
BUN SERPL-MCNC: 20 MG/DL (ref 6–20)
BUN/CREAT SERPL: 26 (ref 12–20)
CALCIUM SERPL-MCNC: 8.4 MG/DL (ref 8.5–10.1)
CALCULATED P AXIS, ECG09: 58 DEGREES
CALCULATED P AXIS, ECG09: 68 DEGREES
CALCULATED R AXIS, ECG10: -6 DEGREES
CALCULATED R AXIS, ECG10: 15 DEGREES
CALCULATED T AXIS, ECG11: -22 DEGREES
CALCULATED T AXIS, ECG11: 6 DEGREES
CHLORIDE SERPL-SCNC: 108 MMOL/L (ref 97–108)
CO2 SERPL-SCNC: 26 MMOL/L (ref 21–32)
CREAT SERPL-MCNC: 0.77 MG/DL (ref 0.7–1.3)
DIAGNOSIS, 93000: NORMAL
DIAGNOSIS, 93000: NORMAL
ERYTHROCYTE [DISTWIDTH] IN BLOOD BY AUTOMATED COUNT: 12.9 % (ref 11.5–14.5)
EST. AVERAGE GLUCOSE BLD GHB EST-MCNC: 148 MG/DL
GLUCOSE BLD STRIP.AUTO-MCNC: 144 MG/DL (ref 65–100)
GLUCOSE BLD STRIP.AUTO-MCNC: 164 MG/DL (ref 65–100)
GLUCOSE BLD STRIP.AUTO-MCNC: 69 MG/DL (ref 65–100)
GLUCOSE BLD STRIP.AUTO-MCNC: 85 MG/DL (ref 65–100)
GLUCOSE BLD STRIP.AUTO-MCNC: 87 MG/DL (ref 65–100)
GLUCOSE SERPL-MCNC: 106 MG/DL (ref 65–100)
HBA1C MFR BLD: 6.8 % (ref 4–5.6)
HCT VFR BLD AUTO: 41.1 % (ref 36.6–50.3)
HGB BLD-MCNC: 13.7 G/DL (ref 12.1–17)
INR PPP: 1 (ref 0.9–1.1)
MCH RBC QN AUTO: 30.2 PG (ref 26–34)
MCHC RBC AUTO-ENTMCNC: 33.3 G/DL (ref 30–36.5)
MCV RBC AUTO: 90.5 FL (ref 80–99)
NRBC # BLD: 0 K/UL (ref 0–0.01)
NRBC BLD-RTO: 0 PER 100 WBC
P-R INTERVAL, ECG05: 218 MS
P-R INTERVAL, ECG05: 256 MS
PLATELET # BLD AUTO: 289 K/UL (ref 150–400)
PMV BLD AUTO: 9.4 FL (ref 8.9–12.9)
POTASSIUM SERPL-SCNC: 3.3 MMOL/L (ref 3.5–5.1)
PROTHROMBIN TIME: 10.9 SEC (ref 9–11.1)
Q-T INTERVAL, ECG07: 444 MS
Q-T INTERVAL, ECG07: 476 MS
QRS DURATION, ECG06: 148 MS
QRS DURATION, ECG06: 166 MS
QTC CALCULATION (BEZET), ECG08: 510 MS
QTC CALCULATION (BEZET), ECG08: 521 MS
RBC # BLD AUTO: 4.54 M/UL (ref 4.1–5.7)
SERVICE CMNT-IMP: ABNORMAL
SERVICE CMNT-IMP: ABNORMAL
SERVICE CMNT-IMP: NORMAL
SODIUM SERPL-SCNC: 140 MMOL/L (ref 136–145)
THERAPEUTIC RANGE,PTTT: ABNORMAL SECS (ref 58–77)
THERAPEUTIC RANGE,PTTT: ABNORMAL SECS (ref 58–77)
THERAPEUTIC RANGE,PTTT: NORMAL SECS (ref 58–77)
TROPONIN I SERPL-MCNC: 2.56 NG/ML
TROPONIN I SERPL-MCNC: 2.65 NG/ML
TROPONIN I SERPL-MCNC: 2.79 NG/ML
VENTRICULAR RATE, ECG03: 69 BPM
VENTRICULAR RATE, ECG03: 83 BPM
WBC # BLD AUTO: 10.1 K/UL (ref 4.1–11.1)

## 2021-05-10 PROCEDURE — 93005 ELECTROCARDIOGRAM TRACING: CPT

## 2021-05-10 PROCEDURE — 74011250637 HC RX REV CODE- 250/637: Performed by: INTERNAL MEDICINE

## 2021-05-10 PROCEDURE — 85610 PROTHROMBIN TIME: CPT

## 2021-05-10 PROCEDURE — 74011250636 HC RX REV CODE- 250/636: Performed by: INTERNAL MEDICINE

## 2021-05-10 PROCEDURE — 74011250636 HC RX REV CODE- 250/636: Performed by: EMERGENCY MEDICINE

## 2021-05-10 PROCEDURE — 84484 ASSAY OF TROPONIN QUANT: CPT

## 2021-05-10 PROCEDURE — 85027 COMPLETE CBC AUTOMATED: CPT

## 2021-05-10 PROCEDURE — 74011636637 HC RX REV CODE- 636/637: Performed by: INTERNAL MEDICINE

## 2021-05-10 PROCEDURE — 36415 COLL VENOUS BLD VENIPUNCTURE: CPT

## 2021-05-10 PROCEDURE — 85730 THROMBOPLASTIN TIME PARTIAL: CPT

## 2021-05-10 PROCEDURE — 65620000000 HC RM CCU GENERAL

## 2021-05-10 PROCEDURE — 74011000250 HC RX REV CODE- 250: Performed by: EMERGENCY MEDICINE

## 2021-05-10 PROCEDURE — 80048 BASIC METABOLIC PNL TOTAL CA: CPT

## 2021-05-10 PROCEDURE — 83036 HEMOGLOBIN GLYCOSYLATED A1C: CPT

## 2021-05-10 PROCEDURE — 82962 GLUCOSE BLOOD TEST: CPT

## 2021-05-10 RX ORDER — MAGNESIUM SULFATE 100 %
4 CRYSTALS MISCELLANEOUS AS NEEDED
Status: DISCONTINUED | OUTPATIENT
Start: 2021-05-10 | End: 2021-05-14 | Stop reason: HOSPADM

## 2021-05-10 RX ORDER — INSULIN LISPRO 100 [IU]/ML
INJECTION, SOLUTION INTRAVENOUS; SUBCUTANEOUS
Status: DISCONTINUED | OUTPATIENT
Start: 2021-05-10 | End: 2021-05-14 | Stop reason: HOSPADM

## 2021-05-10 RX ORDER — HEPARIN SODIUM 5000 [USP'U]/ML
4000 INJECTION, SOLUTION INTRAVENOUS; SUBCUTANEOUS ONCE
Status: COMPLETED | OUTPATIENT
Start: 2021-05-10 | End: 2021-05-10

## 2021-05-10 RX ORDER — FUROSEMIDE 40 MG/1
40 TABLET ORAL DAILY
Status: DISCONTINUED | OUTPATIENT
Start: 2021-05-10 | End: 2021-05-14 | Stop reason: HOSPADM

## 2021-05-10 RX ORDER — DEXTROSE 50 % IN WATER (D50W) INTRAVENOUS SYRINGE
12.5-25 AS NEEDED
Status: DISCONTINUED | OUTPATIENT
Start: 2021-05-10 | End: 2021-05-14 | Stop reason: HOSPADM

## 2021-05-10 RX ORDER — RANOLAZINE 500 MG/1
1000 TABLET, EXTENDED RELEASE ORAL 2 TIMES DAILY
Status: DISCONTINUED | OUTPATIENT
Start: 2021-05-10 | End: 2021-05-14 | Stop reason: HOSPADM

## 2021-05-10 RX ORDER — HEPARIN SODIUM 1000 [USP'U]/ML
4000 INJECTION, SOLUTION INTRAVENOUS; SUBCUTANEOUS ONCE
Status: COMPLETED | OUTPATIENT
Start: 2021-05-10 | End: 2021-05-10

## 2021-05-10 RX ORDER — HEPARIN SODIUM 1000 [USP'U]/ML
2000 INJECTION, SOLUTION INTRAVENOUS; SUBCUTANEOUS ONCE
Status: COMPLETED | OUTPATIENT
Start: 2021-05-10 | End: 2021-05-10

## 2021-05-10 RX ADMIN — INSULIN GLARGINE 45 UNITS: 100 INJECTION, SOLUTION SUBCUTANEOUS at 01:19

## 2021-05-10 RX ADMIN — METOPROLOL SUCCINATE 50 MG: 50 TABLET, EXTENDED RELEASE ORAL at 09:01

## 2021-05-10 RX ADMIN — RANOLAZINE 1000 MG: 500 TABLET, FILM COATED, EXTENDED RELEASE ORAL at 09:02

## 2021-05-10 RX ADMIN — ASPIRIN 81 MG: 81 TABLET, COATED ORAL at 09:01

## 2021-05-10 RX ADMIN — TICAGRELOR 90 MG: 90 TABLET ORAL at 23:30

## 2021-05-10 RX ADMIN — HEPARIN SODIUM 2000 UNITS: 1000 INJECTION INTRAVENOUS; SUBCUTANEOUS at 21:07

## 2021-05-10 RX ADMIN — FUROSEMIDE 40 MG: 40 TABLET ORAL at 14:11

## 2021-05-10 RX ADMIN — MORPHINE SULFATE 4 MG: 4 INJECTION, SOLUTION INTRAMUSCULAR; INTRAVENOUS at 05:16

## 2021-05-10 RX ADMIN — HEPARIN SODIUM 4000 UNITS: 5000 INJECTION INTRAVENOUS; SUBCUTANEOUS at 06:22

## 2021-05-10 RX ADMIN — Medication 2000 UNITS: at 09:01

## 2021-05-10 RX ADMIN — MORPHINE SULFATE 4 MG: 4 INJECTION, SOLUTION INTRAMUSCULAR; INTRAVENOUS at 00:38

## 2021-05-10 RX ADMIN — HEPARIN SODIUM 13 UNITS/KG/HR: 10000 INJECTION, SOLUTION INTRAVENOUS at 06:23

## 2021-05-10 RX ADMIN — INSULIN GLARGINE 45 UNITS: 100 INJECTION, SOLUTION SUBCUTANEOUS at 21:28

## 2021-05-10 RX ADMIN — MORPHINE SULFATE 4 MG: 4 INJECTION, SOLUTION INTRAMUSCULAR; INTRAVENOUS at 18:11

## 2021-05-10 RX ADMIN — HEPARIN SODIUM 17 UNITS/KG/HR: 10000 INJECTION, SOLUTION INTRAVENOUS at 18:13

## 2021-05-10 RX ADMIN — RANOLAZINE 1000 MG: 500 TABLET, FILM COATED, EXTENDED RELEASE ORAL at 20:14

## 2021-05-10 RX ADMIN — INSULIN GLARGINE 40 UNITS: 100 INJECTION, SOLUTION SUBCUTANEOUS at 01:19

## 2021-05-10 RX ADMIN — INSULIN GLARGINE 40 UNITS: 100 INJECTION, SOLUTION SUBCUTANEOUS at 21:28

## 2021-05-10 RX ADMIN — SACUBITRIL AND VALSARTAN 1 TABLET: 24; 26 TABLET, FILM COATED ORAL at 09:02

## 2021-05-10 RX ADMIN — TICAGRELOR 90 MG: 90 TABLET ORAL at 12:44

## 2021-05-10 RX ADMIN — DULOXETINE HYDROCHLORIDE 30 MG: 30 CAPSULE, DELAYED RELEASE ORAL at 09:01

## 2021-05-10 RX ADMIN — Medication 10 ML: at 00:38

## 2021-05-10 RX ADMIN — SACUBITRIL AND VALSARTAN 1 TABLET: 24; 26 TABLET, FILM COATED ORAL at 20:14

## 2021-05-10 RX ADMIN — Medication 10 ML: at 05:18

## 2021-05-10 RX ADMIN — NITROGLYCERIN 45 MCG/MIN: 20 INJECTION INTRAVENOUS at 00:50

## 2021-05-10 RX ADMIN — HEPARIN SODIUM 4000 UNITS: 1000 INJECTION INTRAVENOUS; SUBCUTANEOUS at 14:11

## 2021-05-10 RX ADMIN — GLIPIZIDE 10 MG: 5 TABLET ORAL at 16:25

## 2021-05-10 NOTE — INTERDISCIPLINARY ROUNDS
Multidisciplinary rounds were held 5/10/2021. Today's plan/goal includes (but not limited to): pain free, labs, stable VS, ? cath

## 2021-05-10 NOTE — PROGRESS NOTES
1930: Bedside shift report received from [id number] [facility].  2015: q6 ptt and troponin drawn. 2030: Priyanka Gale, hospitalist NP, at bedside. Orders received. 2110: pTT - 46.6. Heparin bolus given and increased heparin gtt per protocol. 0330: pTT drawn. Therapeutic x1. AM labs drawn. 0700: Dr Alina Ruffin at bedside. 0730: Bedside and Verbal shift change report given to Espinoza Lazar (oncoming nurse) by Shailesh Olmos (offgoing nurse). Report included the following information SBAR, Kardex, Intake/Output, MAR, Accordion and Cardiac Rhythm nsr, bbb.

## 2021-05-10 NOTE — ED NOTES
No apparent distress at the time of admission, patient states, \"I do still have some mild chest pain. \"

## 2021-05-10 NOTE — PROGRESS NOTES
0730 Bedside and Verbal shift change report given to Dot RN (oncoming nurse) by Simona Gil RN (offgoing nurse). Report included the following information SBAR, Kardex, Procedure Summary, Intake/Output, MAR, Recent Results and Cardiac Rhythm NSR w/ RBBB. [facility] [address] [facility]/ Dr. Aris Siemens - orders received for cardiac regular diet & hospitalist consult for DM management  1230 Spoke w/ Dr. Aris Siemens - orders received for lasix 40mg po every day  1400 PTT 37.9 - 4000u bolus & increased gtt per protocol - will recheck in 6 hrs   1930  Bedside and Verbal shift change report given to Rafi Smalls RN (oncoming nurse) by Rere Rodriguez RN (offgoing nurse). Report included the following information SBAR, Kardex, Intake/Output, MAR, Recent Results and Cardiac Rhythm NSR w/BBB.

## 2021-05-10 NOTE — PROGRESS NOTES
1384 TRANSFER - IN REPORT:    Verbal report received from Carrie(name) on Paxico Pencil  being received from ER(unit) for routine progression of care      Report consisted of patients Situation, Background, Assessment and   Recommendations(SBAR). Information from the following report(s) SBAR, Kardex, Intake/Output, MAR, Recent Results and Alarm Parameters  was reviewed with the receiving nurse. Opportunity for questions and clarification was provided. Assessment completed upon patients arrival to unit and care assumed.        Primary Nurse Mena Tillman RN and DONITA Dorantes performed a dual skin assessment on this patient No impairment noted    Current Bed:     [facility]    Carlos score is 22

## 2021-05-10 NOTE — ED NOTES
Patient sitting up in bed with his spouse at the bedside. He states, \"I still have some pressure in my chest.\" Patient initiated on the nitroglycerin drip; RN will continue to reevaluate.

## 2021-05-10 NOTE — PROGRESS NOTES
0035 Pt arrived to CCU in stretcher, ambulated to bed, wife at bedside. Oriented to room and call system. Tridil drip increased for chest/left arm pain and morphine given. New PIV started. VSS, call bell at side. 0500 Labs drawn. EKG completed. 0515 Morphine given per pt request for chest and left arm discomfort.  0600 Tridil stopped for BP 67/44.  0730 Dr. David Harris at bedside. 0730 Bedside and Verbal shift change report given to Dot (oncoming nurse) by Moo Wu (offgoing nurse). Report included the following information SBAR, Kardex, Intake/Output, MAR, Recent Results and Alarm Parameters .

## 2021-05-10 NOTE — H&P
CARDIOLOGY ADMIT                  Subjective:    Date of  Admission:   Admission type:Emergency    Florencio Guerrero MD     This is a 57 yom with CAD, CABG here with unstable angina. He had a LHC on 5/3 which showed a complete occlusion of his SVG-RCA. As his symptoms were controlled he was discharged with plan for medical management. He represented to the ED on evening of 5/7 with more severe pain. He had a LHC and had PCI to the SVG-RCA with mechanical thrombectomy and SANTOS x4. He had resolution of his pain and was discharged. He was dong well for the rest of the day. He then had a reoccurrence of his chest pain and came to the ED. He took SL NTG and had some mild improvement. A STEMI was initially called en route but his EKG was unchanged. His pain was similar to that in the previous episodes but was milder in severity. He had diaphoresis which was also typical for his angina as well as the symptoms radiating to his shoulder. Patient Active Problem List   Diagnosis Code    NSTEMI (non-ST elevated myocardial infarction) ([facility] [address] B92.0    Systolic CHF, acute ([facility]) I50.21    Unstable angina ([facility]) I20.0    STEMI (ST elevation myocardial infarction) ([facility] [address] I21.3    Acute ST elevation myocardial infarction (STEMI) of inferior wall ([facility]) I21.19    Chest pain R07.9    Coronary artery disease involving coronary bypass graft of native heart with unstable angina pectoris ([address] I25.700        Past Medical History:   Diagnosis Date    Diabetes ([address]     Glaucoma     right eye    Hypercholesteremia     Hypertension     Kidney stones     MI, old       Past Surgical History:   Procedure Laterality Date    HX CORONARY ARTERY BYPASS GRAFT      HX CORONARY STENT PLACEMENT      HX WISDOM TEETH EXTRACTION        No family history on file.    Social History     Socioeconomic History    Marital status:      Spouse name: Not on file    Number of children: Not on file    Years of education: Not on file    Highest education level: Not on file   Occupational History    Not on file   Social Needs    Financial resource strain: Not on file    Food insecurity     Worry: Not on file     Inability: Not on file    Transportation needs     Medical: Not on file     Non-medical: Not on file   Tobacco Use    Smoking status: Current Every Day Smoker    Smokeless tobacco: Never Used   Substance and Sexual Activity    Alcohol use:  Yes    Drug use: No    Sexual activity: Not on file   Lifestyle    Physical activity     Days per week: Not on file     Minutes per session: Not on file    Stress: Not on file   Relationships    Social connections     Talks on phone: Not on file     Gets together: Not on file     Attends Orthodox service: Not on file     Active member of club or organization: Not on file     Attends meetings of clubs or organizations: Not on file     Relationship status: Not on file    Intimate partner violence     Fear of current or ex partner: Not on file     Emotionally abused: Not on file     Physically abused: Not on file     Forced sexual activity: Not on file   Other Topics Concern    Not on file   Social History Narrative    Not on file        Current Facility-Administered Medications   Medication Dose Route Frequency    nitroglycerin (Tridil) 200 mcg/ml infusion  0-200 mcg/min IntraVENous TITRATE    heparin (porcine) injection 4,000 Units  4,000 Units IntraVENous ONCE    heparin 25,000 units in D5W 250 ml infusion  9-25 Units/kg/hr IntraVENous TITRATE    sodium chloride (NS) flush 5-40 mL  5-40 mL IntraVENous Q8H    sodium chloride (NS) flush 5-40 mL  5-40 mL IntraVENous PRN    acetaminophen (TYLENOL) tablet 650 mg  650 mg Oral Q6H PRN    Or    acetaminophen (TYLENOL) suppository 650 mg  650 mg Rectal Q6H PRN    polyethylene glycol (MIRALAX) packet 17 g  17 g Oral DAILY PRN    promethazine (PHENERGAN) tablet 12.5 mg  12.5 mg Oral Q6H PRN    Or    ondansetron (ZOFRAN) injection 4 mg  4 mg IntraVENous Q6H PRN    [START ON 5/10/2021] aspirin delayed-release tablet 81 mg  81 mg Oral DAILY    [START ON 5/10/2021] cholecalciferol (VITAMIN D3) (1000 Units /25 mcg) tablet 2,000 Units  2,000 Units Oral DAILY    [START ON 5/10/2021] DULoxetine (CYMBALTA) capsule 30 mg  30 mg Oral DAILY    ranolazine ER (RANEXA) tablet 500 mg  500 mg Oral BID    . PHARMACY TO SUBSTITUTE PER PROTOCOL (Reordered from: nebivolol (BYSTOLIC) 5 mg tablet)    Per Protocol    sacubitriL-valsartan (ENTRESTO) 24-26 mg tablet 1 Tab  1 Tab Oral Q12H    ticagrelor (BRILINTA) tablet 90 mg  90 mg Oral Q12H    . PHARMACY TO SUBSTITUTE PER PROTOCOL (Reordered from: glipiZIDE SR (GLUCOTROL XL) 5 mg CR tablet)    Per Protocol    . PHARMACY TO SUBSTITUTE PER PROTOCOL (Reordered from: insulin glargine (LANTUS,BASAGLAR) 100 unit/mL (3 mL) inpn)    Per Protocol     Current Outpatient Medications   Medication Sig    ticagrelor (BRILINTA) 90 mg tablet Take 1 Tab by mouth every twelve (12) hours every twelve (12) hours.  diclofenac EC (VOLTAREN) 75 mg EC tablet Take 75 mg by mouth two (2) times daily as needed for Pain.  cycloSPORINE (Restasis) 0.05 % dpet Administer 1 Drop to both eyes daily.  cyclobenzaprine HCl (FLEXERIL PO) Take 10 mg by mouth three (3) times daily as needed for Pain.  evolocumab (Repatha Pushtronex) 420 mg/3.5 mL Injt 10 mg by SubCUTAneous route every thirty (30) days. Every 2 weeks on Sunday; next dose due 5/20/18    ranolazine ER (Ranexa) 500 mg SR tablet Take 1 Tab by mouth two (2) times a day.  furosemide (Lasix) 40 mg tablet Take 1 Tab by mouth two (2) times a day. (Patient taking differently: Take 40 mg by mouth daily.)    cholecalciferol, vitamin D3, (VITAMIN D3) 2,000 unit tab Take 2,000 Units by mouth daily.  multivits,Stress Formula-Zinc tablet Take 1 Tab by mouth daily.  sacubitril-valsartan (ENTRESTO) 24 mg/26 mg tablet Take 1 Tab by mouth every twelve (12) hours.     insulin glargine (LANTUS,BASAGLAR) 100 unit/mL (3 mL) inpn 85 Units by SubCUTAneous route nightly.  dapagliflozin (FARXIGA) 10 mg tab tablet Take 10 mg by mouth daily.  aspirin delayed-release 81 mg tablet Take 1 Tab by mouth daily.  nebivolol (BYSTOLIC) 5 mg tablet Take 5 mg by mouth every evening.  DULoxetine (CYMBALTA) 30 mg capsule Take 30 mg by mouth daily.  glipiZIDE SR (GLUCOTROL XL) 5 mg CR tablet Take 10 mg by mouth Before breakfast and dinner. Review of Symptoms:    Gen - no F/C,+/S  Eyes - no vision changes  ENT - no sore throat, rhinorrhea, otalgia  CV - + CP, no palpitations, no orthopnea, no PND, no ANGEL  Resp no cough, no SOB/MILLARD  GI - no AP, no n/v/d/c   - no dysuria, no hematuria  MSK - no abnormal joint pains  Skin - no rashes  Neuro - no HA, no numbness, no weakness, no slurred speech  Psych - no change in mood       Physical Exam    /83   Pulse 76   Temp 98.3 °F (36.8 °C)   Resp 17   Wt 111 kg (244 lb 11.4 oz)   SpO2 94%   BMI 32.29 kg/m²      NAD  Skin warm and dry  Nl conjunctiva  Oropharynx without exudate. Neck supple  Lungs clear  Normal S1/ S2 with occasional ectopy  No Murmurs, click or Rubs  Abdomen soft and non tender  Pulses 2+ radials  Neuro:  Grossly intact  Appropriate       Cardiographics    Telemetry: normal sinus rhythm  ECG: NSR, inferior infarct  Echocardiogram: pending      Assessment: This is a 57 ym with CAD s/p CABG and PCI 48 hours ago here with recurrent pain. Unlikely that his SVG which is a very large conduit would have reoccluded or thrombosed in this short period of time especially while on DAPT. Remaining disease is all chronic so unlikely urgent/emergent LHC will be of benefit unless clinical condition changes. Pain is similar in quality but milder in intensity so will not take directly to cath lab.     Plan:    Admit to CCU for close monitoring; NPO for possible procedure tomorrow  Cont heparin and NTG drips which were started in the ED  First troponin was abnormal but unclear if this is related to acute pain or recent PCI; continue to trend; no need to continue trending if next value decreased from troponin drawn in ED  Cont other cardiac meds including DAPT; he has not taken his PM meds as per his discussion with RN staff in ED  Will d/w primary cardiologist, Dr. Olivia Olmedo.     Please call with questions    CCT 31-74 minutes

## 2021-05-10 NOTE — ED PROVIDER NOTES
59-year-old male arrives from home via EMS with a complaint of chest pain. Symptoms started about an hour and a half prior to arrival after he walked his daughter to a car. Pain has been pretty constant since then with no radiation. Is located substernally. He has had some diaphoresis. But no nausea or vomiting. No cough or fevers. EMS administered 2 nitroglycerin without relief of the symptoms. Patient has an extensive cardiac history and was discharged recently after being cathed for an NSTEMI. He was also admitted 6 days ago with a STEMI. His primary cardiologist is Dr. Aris Siemens. Past Medical History:   Diagnosis Date    Diabetes ([facility] [address]     Glaucoma     right eye    Hypercholesteremia     Hypertension     Kidney stones     MI, old        Past Surgical History:   Procedure Laterality Date    HX CORONARY ARTERY BYPASS GRAFT      HX CORONARY STENT PLACEMENT      HX WISDOM TEETH EXTRACTION           No family history on file. Social History     Socioeconomic History    Marital status:      Spouse name: Not on file    Number of children: Not on file    Years of education: Not on file    Highest education level: Not on file   Occupational History    Not on file   Social Needs    Financial resource strain: Not on file    Food insecurity     Worry: Not on file     Inability: Not on file    Transportation needs     Medical: Not on file     Non-medical: Not on file   Tobacco Use    Smoking status: Current Every Day Smoker    Smokeless tobacco: Never Used   Substance and Sexual Activity    Alcohol use:  Yes    Drug use: No    Sexual activity: Not on file   Lifestyle    Physical activity     Days per week: Not on file     Minutes per session: Not on file    Stress: Not on file   Relationships    Social connections     Talks on phone: Not on file     Gets together: Not on file     Attends [employer] service: Not on file     Active member of club or organization: Not on file Attends meetings of clubs or organizations: Not on file     Relationship status: Not on file    Intimate partner violence     Fear of current or ex partner: Not on file     Emotionally abused: Not on file     Physically abused: Not on file     Forced sexual activity: Not on file   Other Topics Concern    Not on file   Social History Narrative    Not on file         ALLERGIES: Tape [adhesive]    Review of Systems   Constitutional: Negative for diaphoresis and fever. HENT: Negative for facial swelling. Eyes: Negative for visual disturbance. Respiratory: Negative for cough. Cardiovascular: Positive for chest pain. Gastrointestinal: Negative for abdominal pain. Genitourinary: Negative for dysuria. Musculoskeletal: Negative for joint swelling. Skin: Negative for rash. Neurological: Negative for headaches. Hematological: Negative for adenopathy. Psychiatric/Behavioral: Negative for suicidal ideas. There were no vitals filed for this visit. Physical Exam  Vitals signs and nursing note reviewed. Constitutional:       General: He is not in acute distress. Appearance: He is well-developed. He is obese. He is diaphoretic. HENT:      Head: Normocephalic and atraumatic. Eyes:      Pupils: Pupils are equal, round, and reactive to light. Neck:      Musculoskeletal: Normal range of motion and neck supple. Cardiovascular:      Rate and Rhythm: Normal rate. Pulmonary:      Effort: Pulmonary effort is normal. No respiratory distress. Abdominal:      General: There is no distension. Musculoskeletal: Normal range of motion. Skin:     General: Skin is warm. Neurological:      Mental Status: He is alert and oriented to person, place, and time.           MDM  Number of Diagnoses or Management Options  Acute chest pain  Coronary artery disease with unstable angina pectoris, unspecified vessel or lesion type, unspecified whether native or transplanted heart ([address]  Diagnosis management comments: Assessment: Patient was seen by Dr. Wyatt Payan in the emergency department. Vital signs stable. Troponin was elevated 2.47 which is up slightly from when he was last admitted to the hospital on May 7. Nitroglycerin and heparin were ordered. Dr. Wyatt Payan will admit the patient for further evaluation. Amount and/or Complexity of Data Reviewed  Clinical lab tests: reviewed  Tests in the radiology section of CPT®: reviewed  Tests in the medicine section of CPT®: reviewed      ED Course as of May 09 2257   Sun May 09, 2021   2153 9:53 PM  Discussed with Dr. Wyatt Payan who rec'd canceling STEMI. He plans to discuss with interventionalist and call back. [JM]   7419 ED EKG interpretation:  Rhythm: normal sinus rhythm. Rate (approx.): 83. Axis: normal.  ST segment:  No concerning ST elevations or depressions. RBBB. This EKG was interpreted by Jena Walters MD,ED Provider. EKG, 12 LEAD, INITIAL [JM]   8329 10:12 PM  Discussed again with Dr. Wyatt Payan (cardiology) who stated no plan to go to cath lab tonight. Will work on pain control. Dr. Wyatt Payan will admit. Agrees to start nitro gtt and heparin gtt. [JM]      ED Course User Index  [JM] Lorena Peters MD     10:57 PM  I have spent 35 minutes of critical care time involved in lab review, consultations with specialist, family decision-making, and documentation. During this entire length of time I was immediately available to the patient and/or family.     Procedures

## 2021-05-10 NOTE — PROGRESS NOTES
[facility] Cardiology Progress Note    Date of service: 5/10/2021    Subjective:   History reviewed with patient and from chart review. His chest pain is now rated at 1/10  Fairly constant  Interestingly he does note that there is chest wall tenderness and the pain is worse with movement    On Heparin drip. NTG suspended due to hypotension overnight. Objective:    Visit Vitals  BP (!) 100/59   Pulse 63   Temp 97.7 °F (36.5 °C)   Resp 17   Wt 109.3 kg (240 lb 15.4 oz)   SpO2 94%   BMI 31.79 kg/m²       Physical Exam   Constitutional: He is oriented to person, place, and time. He appears well-developed and well-nourished. HENT:   Head: Normocephalic and atraumatic. Eyes: Conjunctivae are normal. No scleral icterus. Neck: No JVD present. Cardiovascular: Normal rate, regular rhythm and normal heart sounds. Exam reveals no gallop. No murmur heard. Pulmonary/Chest: Effort normal and breath sounds normal. No stridor. No respiratory distress. He has no wheezes. He has no rales. Abdominal: Soft. He exhibits no distension. Musculoskeletal:         General: No deformity or edema. Neurological: He is alert and oriented to person, place, and time. Skin: Skin is warm and dry. Psychiatric: He has a normal mood and affect.  His behavior is normal.       Data reviewed:  Recent Results (from the past 12 hour(s))   EKG, 12 LEAD, INITIAL    Collection Time: 05/09/21  9:49 PM   Result Value Ref Range    Ventricular Rate 83 BPM    Atrial Rate 83 BPM    P-R Interval 218 ms    QRS Duration 148 ms    Q-T Interval 444 ms    QTC Calculation (Bezet) 521 ms    Calculated P Axis 68 degrees    Calculated R Axis 15 degrees    Calculated T Axis 6 degrees    Diagnosis       Sinus rhythm with 1st degree AV block  Right bundle branch block  Inferior infarct (cited on or before 07-JUN-2018)  When compared with ECG of 08-MAY-2021 00:55,  Questionable change in initial forces of Inferior leads  T wave inversion no longer evident in Anterior leads     METABOLIC PANEL, COMPREHENSIVE    Collection Time: 05/09/21  9:58 PM   Result Value Ref Range    Sodium 141 136 - 145 mmol/L    Potassium 3.7 3.5 - 5.1 mmol/L    Chloride 106 97 - 108 mmol/L    CO2 30 21 - 32 mmol/L    Anion gap 5 5 - 15 mmol/L    Glucose 134 (H) 65 - 100 mg/dL    BUN 17 6 - 20 MG/DL    Creatinine 0.98 0.70 - 1.30 MG/DL    BUN/Creatinine ratio 17 12 - 20      GFR est AA >60 >60 ml/min/1.73m2    GFR est non-AA >60 >60 ml/min/1.73m2    Calcium 8.9 8.5 - 10.1 MG/DL    Bilirubin, total 0.4 0.2 - 1.0 MG/DL    ALT (SGPT) 26 12 - 78 U/L    AST (SGOT) 15 15 - 37 U/L    Alk. phosphatase 86 45 - 117 U/L    Protein, total 7.2 6.4 - 8.2 g/dL    Albumin 3.5 3.5 - 5.0 g/dL    Globulin 3.7 2.0 - 4.0 g/dL    A-G Ratio 0.9 (L) 1.1 - 2.2     CBC WITH AUTOMATED DIFF    Collection Time: 05/09/21  9:58 PM   Result Value Ref Range    WBC 10.3 4.1 - 11.1 K/uL    RBC 4.88 4.10 - 5.70 M/uL    HGB 14.7 12.1 - 17.0 g/dL    HCT 44.6 36.6 - 50.3 %    MCV 91.4 80.0 - 99.0 FL    MCH 30.1 26.0 - 34.0 PG    MCHC 33.0 30.0 - 36.5 g/dL    RDW 12.9 11.5 - 14.5 %    PLATELET 609 741 - 192 K/uL    MPV 9.5 8.9 - 12.9 FL    NRBC 0.0 0  WBC    ABSOLUTE NRBC 0.00 0.00 - 0.01 K/uL    NEUTROPHILS 58 32 - 75 %    LYMPHOCYTES 32 12 - 49 %    MONOCYTES 6 5 - 13 %    EOSINOPHILS 3 0 - 7 %    BASOPHILS 1 0 - 1 %    IMMATURE GRANULOCYTES 0 0.0 - 0.5 %    ABS. NEUTROPHILS 6.0 1.8 - 8.0 K/UL    ABS. LYMPHOCYTES 3.3 0.8 - 3.5 K/UL    ABS. MONOCYTES 0.6 0.0 - 1.0 K/UL    ABS. EOSINOPHILS 0.3 0.0 - 0.4 K/UL    ABS. BASOPHILS 0.1 0.0 - 0.1 K/UL    ABS. IMM.  GRANS. 0.0 0.00 - 0.04 K/UL    DF AUTOMATED     TROPONIN I    Collection Time: 05/09/21  9:58 PM   Result Value Ref Range    Troponin-I, Qt. 2.47 (H) <0.05 ng/mL   PROTHROMBIN TIME + INR    Collection Time: 05/09/21  9:58 PM   Result Value Ref Range    INR 1.1 0.9 - 1.1      Prothrombin time 11.1 9.0 - 11.1 sec   NT-PRO BNP    Collection Time: 05/09/21  9:58 PM   Result Value Ref Range    NT pro- (H) <125 PG/ML   SAMPLES BEING HELD    Collection Time: 05/09/21  9:58 PM   Result Value Ref Range    SAMPLES BEING HELD 1RED     COMMENT        Add-on orders for these samples will be processed based on acceptable specimen integrity and analyte stability, which may vary by analyte.    PTT    Collection Time: 05/09/21  9:58 PM   Result Value Ref Range    aPTT 25.0 22.1 - 31.0 sec    aPTT, therapeutic range     58.0 - 77.0 SECS   GLUCOSE, POC    Collection Time: 05/10/21 12:54 AM   Result Value Ref Range    Glucose (POC) 164 (H) 65 - 100 mg/dL    Performed by Wendy Ballesteros    EKG, 12 LEAD, INITIAL    Collection Time: 05/10/21  5:07 AM   Result Value Ref Range    Ventricular Rate 69 BPM    Atrial Rate 69 BPM    P-R Interval 256 ms    QRS Duration 166 ms    Q-T Interval 476 ms    QTC Calculation (Bezet) 510 ms    Calculated P Axis 58 degrees    Calculated R Axis -6 degrees    Calculated T Axis -22 degrees    Diagnosis       Sinus rhythm with 1st degree AV block  Possible Left atrial enlargement  Right bundle branch block  Inferior infarct (cited on or before 07-JUN-2018)  When compared with ECG of 09-MAY-2021 21:49,  No significant change was found     METABOLIC PANEL, BASIC    Collection Time: 05/10/21  5:13 AM   Result Value Ref Range    Sodium 140 136 - 145 mmol/L    Potassium 3.3 (L) 3.5 - 5.1 mmol/L    Chloride 108 97 - 108 mmol/L    CO2 26 21 - 32 mmol/L    Anion gap 6 5 - 15 mmol/L    Glucose 106 (H) 65 - 100 mg/dL    BUN 20 6 - 20 MG/DL    Creatinine 0.77 0.70 - 1.30 MG/DL    BUN/Creatinine ratio 26 (H) 12 - 20      GFR est AA >60 >60 ml/min/1.73m2    GFR est non-AA >60 >60 ml/min/1.73m2    Calcium 8.4 (L) 8.5 - 10.1 MG/DL   CBC W/O DIFF    Collection Time: 05/10/21  5:13 AM   Result Value Ref Range    WBC 10.1 4.1 - 11.1 K/uL    RBC 4.54 4.10 - 5.70 M/uL    HGB 13.7 12.1 - 17.0 g/dL    HCT 41.1 36.6 - 50.3 %    MCV 90.5 80.0 - 99.0 FL    MCH 30.2 26.0 - 34.0 PG    MCHC 33.3 30.0 - 36.5 g/dL    RDW 12.9 11.5 - 14.5 %    PLATELET 542 054 - 053 K/uL    MPV 9.4 8.9 - 12.9 FL    NRBC 0.0 0  WBC    ABSOLUTE NRBC 0.00 0.00 - 0.01 K/uL   PROTHROMBIN TIME + INR    Collection Time: 05/10/21  5:13 AM   Result Value Ref Range    INR 1.0 0.9 - 1.1      Prothrombin time 10.9 9.0 - 11.1 sec   PTT    Collection Time: 05/10/21  5:13 AM   Result Value Ref Range    aPTT 28.1 22.1 - 31.0 sec    aPTT, therapeutic range     58.0 - 77.0 SECS   TROPONIN I    Collection Time: 05/10/21  5:13 AM   Result Value Ref Range    Troponin-I, Qt. 2.56 (H) <0.05 ng/mL     Cath films reviewed  -occluded SVG to RCA recanalized with mechanical thrombectomy and multiple SANTOS by Dr Nilson Alonso. -excellent result achieved despite very challenging anatomy. SUNIL 3 final flow    EKG reviewed:  -NSR with RBBB, inferior MI pattern    Telemetry:  NSR    05/03/21   ECHO ADULT COMPLETE 05/04/2021 5/4/2021    Narrative · Image quality for this study was technically difficult. · Contrast used: DEFINITY. · LV: Estimated LVEF is 45 - 50%. Normal wall thickness. Dilated left   ventricle. Inconclusive left ventricular diastolic function. · MV: Mitral valve non-specific thickening. Mild to moderate mitral valve   regurgitation is present. · LA: Dilated left atrium. Signed by: Chelsi Chang MD         Assessment:    1. Recurrent NSTEMI  Troponin increased to 2.47 and then 2.56  Hard to interpret what this really means- this would not be uncommon after a PCI. Tropon rise is > 20% of baseline (1.99), so this does meet criteria for MI, but it remains difficult to tell if this is related to the event on 5/7 or if this is evidence of graft occlusion and a new event. 2. CAD with h/o CABG with unstable angina. 3. Acute systolic heart failure    4. Ischemic cardiomyopathy: EF 45-50%    5. 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  HTN  Currently hypotensive    Plan:    Continue ASAS, Brilinta, Heparin drip x 48hrs  NTG drip as tolerated - currently suspended due to low BP  Metoplolol as tolerated  Ranolazine    Further attempts at rescue PCI on the SVG to RCA seem future  A  intervention on the native RCA may be needed at some point  Redo CABG may be considered but is less optimal given it is for single vessel and the LIMA to LAD is patent and would be potentially jeopardized by surgery. Signed:  Jw Venegas MD  Interventional Cardiology  5/10/2021

## 2021-05-10 NOTE — ED TRIAGE NOTES
Patient arrives via EMS from home. He was discharged from this hospital today after having a cardiac stent placed. He states, \"I was at home walking around and then began to have chest pain walking down his driveway. After dinner I was watching TV and got up and then started having chest pain. The pain is sometimes sharp and sometimes dull on the top and side of my chest.\" Patient denies SOB.

## 2021-05-11 LAB
ANION GAP SERPL CALC-SCNC: 7 MMOL/L (ref 5–15)
APTT PPP: 52.7 SEC (ref 22.1–31)
APTT PPP: 55.2 SEC (ref 22.1–31)
APTT PPP: 58.1 SEC (ref 22.1–31)
APTT PPP: 58.4 SEC (ref 22.1–31)
BUN SERPL-MCNC: 20 MG/DL (ref 6–20)
BUN/CREAT SERPL: 23 (ref 12–20)
CALCIUM SERPL-MCNC: 8.7 MG/DL (ref 8.5–10.1)
CHLORIDE SERPL-SCNC: 105 MMOL/L (ref 97–108)
CO2 SERPL-SCNC: 27 MMOL/L (ref 21–32)
CREAT SERPL-MCNC: 0.86 MG/DL (ref 0.7–1.3)
GLUCOSE BLD STRIP.AUTO-MCNC: 108 MG/DL (ref 65–117)
GLUCOSE BLD STRIP.AUTO-MCNC: 141 MG/DL (ref 65–117)
GLUCOSE BLD STRIP.AUTO-MCNC: 91 MG/DL (ref 65–117)
GLUCOSE BLD STRIP.AUTO-MCNC: 99 MG/DL (ref 65–117)
GLUCOSE SERPL-MCNC: 155 MG/DL (ref 65–100)
POTASSIUM SERPL-SCNC: 3.7 MMOL/L (ref 3.5–5.1)
SERVICE CMNT-IMP: ABNORMAL
SERVICE CMNT-IMP: NORMAL
SODIUM SERPL-SCNC: 139 MMOL/L (ref 136–145)
THERAPEUTIC RANGE,PTTT: ABNORMAL SECS (ref 58–77)
TROPONIN I SERPL-MCNC: 2.99 NG/ML
TROPONIN I SERPL-MCNC: 3.14 NG/ML
TROPONIN I SERPL-MCNC: 3.19 NG/ML
TROPONIN I SERPL-MCNC: 3.27 NG/ML

## 2021-05-11 PROCEDURE — 74011250636 HC RX REV CODE- 250/636: Performed by: EMERGENCY MEDICINE

## 2021-05-11 PROCEDURE — 74011250637 HC RX REV CODE- 250/637: Performed by: INTERNAL MEDICINE

## 2021-05-11 PROCEDURE — 84484 ASSAY OF TROPONIN QUANT: CPT

## 2021-05-11 PROCEDURE — 99233 SBSQ HOSP IP/OBS HIGH 50: CPT | Performed by: CLINICAL NURSE SPECIALIST

## 2021-05-11 PROCEDURE — 94760 N-INVAS EAR/PLS OXIMETRY 1: CPT

## 2021-05-11 PROCEDURE — 65620000000 HC RM CCU GENERAL

## 2021-05-11 PROCEDURE — 82962 GLUCOSE BLOOD TEST: CPT

## 2021-05-11 PROCEDURE — 36415 COLL VENOUS BLD VENIPUNCTURE: CPT

## 2021-05-11 PROCEDURE — 74011250636 HC RX REV CODE- 250/636: Performed by: INTERNAL MEDICINE

## 2021-05-11 PROCEDURE — 80048 BASIC METABOLIC PNL TOTAL CA: CPT

## 2021-05-11 PROCEDURE — 77010033678 HC OXYGEN DAILY

## 2021-05-11 PROCEDURE — 85730 THROMBOPLASTIN TIME PARTIAL: CPT

## 2021-05-11 PROCEDURE — 74011636637 HC RX REV CODE- 636/637: Performed by: INTERNAL MEDICINE

## 2021-05-11 RX ADMIN — TICAGRELOR 90 MG: 90 TABLET ORAL at 13:07

## 2021-05-11 RX ADMIN — MORPHINE SULFATE 4 MG: 4 INJECTION, SOLUTION INTRAMUSCULAR; INTRAVENOUS at 14:43

## 2021-05-11 RX ADMIN — FUROSEMIDE 40 MG: 40 TABLET ORAL at 08:21

## 2021-05-11 RX ADMIN — SACUBITRIL AND VALSARTAN 1 TABLET: 24; 26 TABLET, FILM COATED ORAL at 08:45

## 2021-05-11 RX ADMIN — MORPHINE SULFATE 4 MG: 4 INJECTION, SOLUTION INTRAMUSCULAR; INTRAVENOUS at 03:19

## 2021-05-11 RX ADMIN — Medication 10 ML: at 14:00

## 2021-05-11 RX ADMIN — INSULIN GLARGINE 40 UNITS: 100 INJECTION, SOLUTION SUBCUTANEOUS at 21:00

## 2021-05-11 RX ADMIN — METOPROLOL SUCCINATE 50 MG: 50 TABLET, EXTENDED RELEASE ORAL at 08:21

## 2021-05-11 RX ADMIN — Medication 2000 UNITS: at 08:21

## 2021-05-11 RX ADMIN — HEPARIN SODIUM 20 UNITS/KG/HR: 10000 INJECTION, SOLUTION INTRAVENOUS at 19:16

## 2021-05-11 RX ADMIN — INSULIN GLARGINE 45 UNITS: 100 INJECTION, SOLUTION SUBCUTANEOUS at 21:00

## 2021-05-11 RX ADMIN — RANOLAZINE 1000 MG: 500 TABLET, FILM COATED, EXTENDED RELEASE ORAL at 20:54

## 2021-05-11 RX ADMIN — TICAGRELOR 90 MG: 90 TABLET ORAL at 22:28

## 2021-05-11 RX ADMIN — SACUBITRIL AND VALSARTAN 1 TABLET: 24; 26 TABLET, FILM COATED ORAL at 20:54

## 2021-05-11 RX ADMIN — GLIPIZIDE 10 MG: 5 TABLET ORAL at 06:42

## 2021-05-11 RX ADMIN — MORPHINE SULFATE 4 MG: 4 INJECTION, SOLUTION INTRAMUSCULAR; INTRAVENOUS at 08:15

## 2021-05-11 RX ADMIN — DULOXETINE HYDROCHLORIDE 30 MG: 30 CAPSULE, DELAYED RELEASE ORAL at 08:21

## 2021-05-11 RX ADMIN — ASPIRIN 81 MG: 81 TABLET, COATED ORAL at 08:21

## 2021-05-11 RX ADMIN — RANOLAZINE 1000 MG: 500 TABLET, FILM COATED, EXTENDED RELEASE ORAL at 08:45

## 2021-05-11 RX ADMIN — HEPARIN SODIUM 19 UNITS/KG/HR: 10000 INJECTION, SOLUTION INTRAVENOUS at 06:48

## 2021-05-11 NOTE — CONSULTS
[facility]    Hospitalist Consult  Primary Care Provider: Nidhi Zhao MD  Consult requested by: Cardiology    Subjective: We are asked to see this patient in consultation to assist with the management of his type 2 diabetes    Deloris Polk is a 57 y.o. male with past medical history significant for CAD (s/p CABG), acute systolic heart failure, ischemic cardiomyopathy, hypertension and type 2 diabetes admitted 5/9/2021 for recurrent NSTEMI. He originally had a PCI on 5/3 but returned to the ED on 5/7 with complaint of chest pain. He had a PCI with thrombectomy and stent placement and returned home that same day. He came to the ED again on 5/9 complaining of chest pain and was admitted by cardiology. Patient currently complains of sharp pain in the left side of his chest and tenderness in the midsternal area that is reproducible with palpation. He gets relief with morphine. Nitroglycerin drip was discontinued this morning. Patient feels that his diabetes is well controlled. He states he checks his sugar only once every few days, but it is \"always between 110 and 120 in the morning\". He currently takes 85 units of Lantus every night in addition to Glucotrol and [place]. His last A1c in May 2018 was 7.3. He denies polydipsia, polyphagia, polyuria, abdominal pain, nausea, vomiting, constipation and diarrhea. Review of Systems:    Review of Systems   Constitutional: Negative for chills, fever, malaise/fatigue and weight loss. HENT: Negative for congestion, sinus pain and sore throat. Eyes: Negative for blurred vision, double vision and photophobia. Respiratory: Negative for cough, shortness of breath and wheezing. Cardiovascular: Positive for chest pain. Negative for palpitations, orthopnea, claudication, leg swelling and PND. Gastrointestinal: Negative for abdominal pain, constipation, diarrhea, nausea and vomiting.    Genitourinary: Negative for dysuria, frequency, hematuria and urgency. Musculoskeletal: Negative for falls, joint pain, myalgias and neck pain. Skin: Negative for itching and rash. Neurological: Negative for dizziness, tingling, tremors, sensory change, speech change, focal weakness, seizures, weakness and headaches. Endo/Heme/Allergies: Negative. Past Medical History:   Diagnosis Date    CAD (coronary artery disease)     Diabetes ([facility] [address]     Glaucoma     right eye    Hypercholesteremia     Hypertension     Ischemic cardiomyopathy     Kidney stones     MI, old     Systolic heart failure ([facility] [address]       Past Surgical History:   Procedure Laterality Date    HX CORONARY ARTERY BYPASS GRAFT      HX CORONARY STENT PLACEMENT      HX WISDOM TEETH EXTRACTION       Prior to Admission medications    Medication Sig Start Date End Date Taking? Authorizing Provider   ticagrelor (BRILINTA) 90 mg tablet Take 1 Tab by mouth every twelve (12) hours every twelve (12) hours. 5/9/21  Yes Sebastián Hernandez MD   diclofenac EC (VOLTAREN) 75 mg EC tablet Take 75 mg by mouth two (2) times daily as needed for Pain. Yes Provider, Historical   cycloSPORINE (Restasis) 0.05 % dpet Administer 1 Drop to both eyes daily. Yes Provider, Historical   cyclobenzaprine HCl (FLEXERIL PO) Take 10 mg by mouth three (3) times daily as needed for Pain. Yes Provider, Historical   evolocumab (Repatha Pushtronex) 420 mg/3.5 mL Injt 10 mg by SubCUTAneous route every thirty (30) days. Every 2 weeks on Sunday; next dose due 5/20/18 5/4/21  Yes Skip Heredia MD   ranolazine ER (Ranexa) 500 mg SR tablet Take 1 Tab by mouth two (2) times a day. 5/4/21  Yes Skip Heredia MD   furosemide (Lasix) 40 mg tablet Take 1 Tab by mouth two (2) times a day. Patient taking differently: Take 40 mg by mouth daily. 5/4/21  Yes Skip Heredia MD   cholecalciferol, vitamin D3, (VITAMIN D3) 2,000 unit tab Take 2,000 Units by mouth daily.    Yes Provider, Historical   multivits,Stress Formula-Zinc tablet Take 1 Tab by mouth daily. Yes Provider, Historical   sacubitril-valsartan (ENTRESTO) 24 mg/26 mg tablet Take 1 Tab by mouth every twelve (12) hours. 6/7/18  Yes Chema Longoria MD   insulin glargine (LANTUS,BASAGLAR) 100 unit/mL (3 mL) inpn 85 Units by SubCUTAneous route nightly. Yes Provider, Historical   dapagliflozin (FARXIGA) 10 mg tab tablet Take 10 mg by mouth daily. Yes Provider, Historical   aspirin delayed-release 81 mg tablet Take 1 Tab by mouth daily. 10/25/16  Yes Chema Longoria MD   nebivolol (BYSTOLIC) 5 mg tablet Take 5 mg by mouth every evening. Yes Other, MD Ilene   DULoxetine (CYMBALTA) 30 mg capsule Take 30 mg by mouth daily. Yes Other, MD Ilene   glipiZIDE SR (GLUCOTROL XL) 5 mg CR tablet Take 10 mg by mouth Before breakfast and dinner. Yes Other, MD Ilene     Allergies   Allergen Reactions    Tape [Adhesive] Hives      Family History   Family history unknown: Yes        Social History:    Social History     Tobacco Use   Smoking Status Current Every Day Smoker   Smokeless Tobacco Never Used     Social History     Substance and Sexual Activity   Alcohol Use Yes     Social History     Substance and Sexual Activity   Drug Use No       Objective:     Physical Exam:    Constitutional:  No acute distress, cooperative, pleasant   Psych:  Good insight, Not anxious nor agitated. Neurologic:  RONEL, EOMI. AAOx3, CN II-XII reviewed  Resp:  CTA bilaterally. Resps easy and unlabored. No wheezing/RUBS/rhonchi/crackles. No accessory muscle use. Able to speak in full sentences. HEENT:  Oral mucosa moist, oropharynx benign. CV:  Regular rhythm, normal rate, no murmurs, gallops, rubs  GI:  Soft, non distended, non tender. normoactive bowel sounds, no hepatosplenomegaly  : Continent, voiding. Urine clear  Musculoskeletal: KOTHARI, ambulatory.   Peripheral Vascular: No edema, warm, 2+ pulses throughout             Patient Vitals for the past 24 hrs:   Temp Pulse Resp BP SpO2   05/10/21 2100  (!) 59 12 109/68 95 %   05/10/21 2000 98.8 °F (37.1 °C) (!) 59 8 107/72 96 %   05/10/21 1900  (!) 58 14 105/66 96 %   05/10/21 1800  62 15 113/72 (!) 88 %   05/10/21 1700  (!) 57 15 107/67 94 %   05/10/21 1600 98.7 °F (37.1 °C) (!) 58 12 100/62 94 %   05/10/21 1500  (!) 58 11 108/72 95 %   05/10/21 1400  68 11 108/73 95 %   05/10/21 1300  63 14 109/71 93 %   05/10/21 1200 97.9 °F (36.6 °C) 61 15 90/66 93 %   05/10/21 1100  78 17 125/80 95 %   05/10/21 1000  (!) 57 15 (!) 97/58 95 %   05/10/21 0900  63 11 105/60 94 %   05/10/21 0800 98.3 °F (36.8 °C) 63 15 105/68 94 %   05/10/21 0700  63 17 (!) 100/59 94 %   05/10/21 0627  71 19 (!) 91/48 97 %   05/10/21 0600  66 12 (!) 67/44 93 %   05/10/21 0513    111/63    05/10/21 0500  65 12 (!) 84/46 95 %   05/10/21 0400 97.7 °F (36.5 °C) 76 12 (!) 102/92 92 %   05/10/21 0300  74 13 (!) 81/54 92 %   05/10/21 0200  90 24 (!) 90/54 91 %   05/10/21 0130  75 14 109/64 94 %   05/10/21 0115  76 12 119/73 92 %   05/10/21 0100  75 14 117/73 90 %   05/10/21 0045  77 19 113/71 95 %   05/10/21 0037 98.9 °F (37.2 °C) 73 21 124/72 93 %   05/10/21 0015  71 13 118/77 95 %   05/10/21 0000  77 17 132/75 96 %   05/09/21 2352  81  126/83    05/09/21 2345  76 13 120/70    05/09/21 2330  73 20 118/77    05/09/21 2315  75 17 118/71    05/09/21 2300  75 15 120/77    05/09/21 2256  76  126/83    05/09/21 2245    126/83 94 %   05/09/21 2230    137/77 95 %       Data Review: All diagnostic labs and studies have been reviewed.     Recent Results (from the past 24 hour(s))   GLUCOSE, POC    Collection Time: 05/10/21 12:54 AM   Result Value Ref Range    Glucose (POC) 164 (H) 65 - 100 mg/dL    Performed by [email or web address]    EKG, 12 LEAD, INITIAL    Collection Time: 05/10/21  5:07 AM   Result Value Ref Range    Ventricular Rate 69 BPM    Atrial Rate 69 BPM    P-R Interval 256 ms    QRS Duration 166 ms    Q-T Interval 476 ms    QTC Calculation (Bezet) 510 ms    Calculated P Axis 58 degrees    Calculated R Axis -6 degrees    Calculated T Axis -22 degrees    Diagnosis       Sinus rhythm with 1st degree AV block  Possible Left atrial enlargement  Right bundle branch block Nonspecific T wave abnormality  Inferior infarct (cited on or before 07-JUN-2018)  When compared with ECG of 09-MAY-2021 21:49,  No significant change was found  Confirmed by Ally Brenner M.D., Elyssa Duran ([id number]) on 5/10/2021 58:26:74 AM     METABOLIC PANEL, BASIC    Collection Time: 05/10/21  5:13 AM   Result Value Ref Range    Sodium 140 136 - 145 mmol/L    Potassium 3.3 (L) 3.5 - 5.1 mmol/L    Chloride 108 97 - 108 mmol/L    CO2 26 21 - 32 mmol/L    Anion gap 6 5 - 15 mmol/L    Glucose 106 (H) 65 - 100 mg/dL    BUN 20 6 - 20 MG/DL    Creatinine 0.77 0.70 - 1.30 MG/DL    BUN/Creatinine ratio 26 (H) 12 - 20      GFR est AA >60 >60 ml/min/1.73m2    GFR est non-AA >60 >60 ml/min/1.73m2    Calcium 8.4 (L) 8.5 - 10.1 MG/DL   CBC W/O DIFF    Collection Time: 05/10/21  5:13 AM   Result Value Ref Range    WBC 10.1 4.1 - 11.1 K/uL    RBC 4.54 4.10 - 5.70 M/uL    HGB 13.7 12.1 - 17.0 g/dL    HCT 41.1 36.6 - 50.3 %    MCV 90.5 80.0 - 99.0 FL    MCH 30.2 26.0 - 34.0 PG    MCHC 33.3 30.0 - 36.5 g/dL    RDW 12.9 11.5 - 14.5 %    PLATELET 147 855 - 482 K/uL    MPV 9.4 8.9 - 12.9 FL    NRBC 0.0 0  WBC    ABSOLUTE NRBC 0.00 0.00 - 0.01 K/uL   PROTHROMBIN TIME + INR    Collection Time: 05/10/21  5:13 AM   Result Value Ref Range    INR 1.0 0.9 - 1.1      Prothrombin time 10.9 9.0 - 11.1 sec   PTT    Collection Time: 05/10/21  5:13 AM   Result Value Ref Range    aPTT 28.1 22.1 - 31.0 sec    aPTT, therapeutic range     58.0 - 77.0 SECS   TROPONIN I    Collection Time: 05/10/21  5:13 AM   Result Value Ref Range    Troponin-I, Qt. 2.56 (H) <0.05 ng/mL   PTT    Collection Time: 05/10/21 12:32 PM   Result Value Ref Range    aPTT 37.9 (H) 22.1 - 31.0 sec    aPTT, therapeutic range     58.0 - 77.0 SECS   TROPONIN I    Collection Time: 05/10/21  4:25 PM   Result Value Ref Range    Troponin-I, Qt. 2.65 (H) <0.05 ng/mL   HEMOGLOBIN A1C WITH EAG    Collection Time: 05/10/21  8:22 PM   Result Value Ref Range    Hemoglobin A1c 6.8 (H) 4.0 - 5.6 %    Est. average glucose 148 mg/dL   PTT    Collection Time: 05/10/21  8:22 PM   Result Value Ref Range    aPTT 46.6 (H) 22.1 - 31.0 sec    aPTT, therapeutic range     58.0 - 77.0 SECS   TROPONIN I    Collection Time: 05/10/21  8:22 PM   Result Value Ref Range    Troponin-I, Qt. 2.79 (H) <0.05 ng/mL   GLUCOSE, POC    Collection Time: 05/10/21  9:18 PM   Result Value Ref Range    Glucose (POC) 87 65 - 100 mg/dL    Performed by Lab[employer] Maxin    GLUCOSE, POC    Collection Time: 05/10/21  9:19 PM   Result Value Ref Range    Glucose (POC) 85 65 - 100 mg/dL    Performed by Laboy Maxin         No results found. Assessment:     Active Problems:    Coronary artery disease involving coronary bypass graft of native heart with unstable angina pectoris ([facility] [address] (5/9/2021)    Type 2 diabetes with hyperglycemia    Plan:     · Blood glucose consistently in the 115-150 range since admission with max 191 mg/dL several nights ago on his home insulin medication regimen  · Currently eating cardiac diet  · We will add ACHS Accu-Cheks with sliding scale coverage,  · We will ask diabetes CNS to review patient's case, provide education make recommendations as appropriate. Given that the diabetes CNS typically does not write orders, medicine will follow until patient's diabetes management is determined, then we will likely sign off.       Signed By: Arina Wilks NP     Date of Service:  5/10/2021

## 2021-05-11 NOTE — INTERDISCIPLINARY ROUNDS
Multidisciplinary rounds were held 5/11/2021.   Today's plan/goal includes (but not limited to): pain free, labs, stable VS

## 2021-05-11 NOTE — PROGRESS NOTES
[facility] Cardiology Progress Note    Date of service: 5/11/2021    Subjective:   His symptoms remain about the same - has fairly constant but mild low grade discomfort, rate as 1-2 / 10. Still notes that his chest wall is tender to the touch. NTG remains off. Objective:    Visit Vitals  /75   Pulse 62   Temp 98.5 °F (36.9 °C)   Resp 15   Wt 108.5 kg (239 lb 3.2 oz)   SpO2 94%   BMI 31.56 kg/m²       Physical Exam   Constitutional: He is oriented to person, place, and time. He appears well-developed and well-nourished. HENT:   Head: Normocephalic and atraumatic. Eyes: Conjunctivae are normal. No scleral icterus. Neck: No JVD present. Cardiovascular: Normal rate, regular rhythm and normal heart sounds. Exam reveals no gallop. No murmur heard. Pulmonary/Chest: Effort normal and breath sounds normal. No stridor. No respiratory distress. He has no wheezes. He has no rales. Abdominal: Soft. He exhibits no distension. Musculoskeletal:         General: No deformity or edema. Neurological: He is alert and oriented to person, place, and time. Skin: Skin is warm and dry. Psychiatric: He has a normal mood and affect.  His behavior is normal.       Data reviewed:  Recent Results (from the past 12 hour(s))   HEMOGLOBIN A1C WITH EAG    Collection Time: 05/10/21  8:22 PM   Result Value Ref Range    Hemoglobin A1c 6.8 (H) 4.0 - 5.6 %    Est. average glucose 148 mg/dL   PTT    Collection Time: 05/10/21  8:22 PM   Result Value Ref Range    aPTT 46.6 (H) 22.1 - 31.0 sec    aPTT, therapeutic range     58.0 - 77.0 SECS   TROPONIN I    Collection Time: 05/10/21  8:22 PM   Result Value Ref Range    Troponin-I, Qt. 2.79 (H) <0.05 ng/mL   GLUCOSE, POC    Collection Time: 05/10/21  9:18 PM   Result Value Ref Range    Glucose (POC) 87 65 - 100 mg/dL    Performed by Leigha Care, POC    Collection Time: 05/10/21  9:19 PM   Result Value Ref Range    Glucose (POC) 85 65 - 100 mg/dL    Performed by [facility]    GLUCOSE, POC    Collection Time: 05/10/21 11:11 PM   Result Value Ref Range    Glucose (POC) 69 65 - 100 mg/dL    Performed by [address] [place] [address]    GLUCOSE, POC    Collection Time: 05/10/21 11:38 PM   Result Value Ref Range    Glucose (POC) 144 (H) 65 - 100 mg/dL    Performed by [address] [place] [address]    METABOLIC PANEL, BASIC    Collection Time: 05/11/21  3:12 AM   Result Value Ref Range    Sodium 139 136 - 145 mmol/L    Potassium 3.7 3.5 - 5.1 mmol/L    Chloride 105 97 - 108 mmol/L    CO2 27 21 - 32 mmol/L    Anion gap 7 5 - 15 mmol/L    Glucose 155 (H) 65 - 100 mg/dL    BUN 20 6 - 20 MG/DL    Creatinine 0.86 0.70 - 1.30 MG/DL    BUN/Creatinine ratio 23 (H) 12 - 20      GFR est AA >60 >60 ml/min/1.73m2    GFR est non-AA >60 >60 ml/min/1.73m2    Calcium 8.7 8.5 - 10.1 MG/DL   TROPONIN I    Collection Time: 05/11/21  3:12 AM   Result Value Ref Range    Troponin-I, Qt. 3.14 (H) <0.05 ng/mL   PTT    Collection Time: 05/11/21  3:27 AM   Result Value Ref Range    aPTT 58.1 (H) 22.1 - 31.0 sec    aPTT, therapeutic range     58.0 - 77.0 SECS   GLUCOSE, POC    Collection Time: 05/11/21  6:44 AM   Result Value Ref Range    Glucose (POC) 99 65 - 117 mg/dL    Performed by [address] [place] [address]      Cath films reviewed  -occluded SVG to RCA recanalized with mechanical thrombectomy and multiple SANTOS by Dr Vikki Clifton. -excellent result achieved despite very challenging anatomy. SUNIL 3 final flow    EKG reviewed:  -NSR with RBBB, inferior MI pattern    Telemetry:  NSR    05/03/21   ECHO ADULT COMPLETE 05/04/2021 5/4/2021    Narrative · Image quality for this study was technically difficult. · Contrast used: DEFINITY. · LV: Estimated LVEF is 45 - 50%. Normal wall thickness. Dilated left   ventricle. Inconclusive left ventricular diastolic function. · MV: Mitral valve non-specific thickening. Mild to moderate mitral valve   regurgitation is present. · LA: Dilated left atrium.         Signed by: Razia Foster MD Assessment:    1. Recurrent NSTEMI  Troponin still slowly rising: up to 3.14. Suspect this will be a slow rise and fall and may indicate the recently stented graft re-occluded due to poor outflow. Suspect there was a small peripheral amount of myocardium around the prior inferior MI that may have been further compromised  Pain symptoms are mild and stable  Hemodynamically stable    2. CAD with h/o CABG with unstable angina. As above, suspect recently stented SVG to RCA may have occluded. 3. Acute systolic heart failure  No overt fluid overload currently. Oxygenating fairly well on [place] oxygen    4. Ischemic cardiomyopathy: EF 45-50%    5. 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 HTN  Pressure intermittently low, limiting anti-anginal therapy    Plan:    Continue ASAS, Brilinta  Heparin gtt until this evening, then we can d/c    Further attempts at rescue PCI on the SVG to RCA seem futile. A  intervention on the native RCA may be considered at some point, but my concern would be there may not be much viability left in the inferior wall. Poor runoff/outflow may be the main reason for his re-presentation. Continue metoprolol and Ranexa  Will try to add nitrates and/or amlodipine for anti-anginal therapy but need to see BP more stable first    Possible transfer to stepdown later today if feeling better and vitals stable. Signed:  Bao Salcido MD  Interventional Cardiology  5/11/2021

## 2021-05-11 NOTE — PROGRESS NOTES
0730 Bedside shift change report given to Berna Valerio RN (oncoming nurse) by Keli Lee RN (offgoing nurse). Report included the following information SBAR, Kardex, Intake/Output, MAR and Recent Results. 0930 PTT and troponin drawn and sent to lab. 1050  PTT subtherapeutic at 55.2. Heparin increased to 20 unit/kg/hr. 1600  PTT and troponin drawn and sent to lab. 1630 PTT therapeutic at 58.4. Next PTT to be drawn at 2130.     1930 Bedside shift change report given to Keli Lee RN (oncoming nurse) by Berna Valerio RN (offgoing nurse). Report included the following information SBAR, Kardex, Intake/Output, MAR and Recent Results.

## 2021-05-11 NOTE — DIABETES MGMT
[facility]    CLINICAL NURSE SPECIALIST CONSULT     Initial Presentation   Nehemiah Ying is a 57 y.o. male who presented to the ED 5/7/21 with a 30 minute complaint of chest pain. Four days prior, the patient presented with left anterior chest pain and a nonacute EKG. His troponin was noted to be 17. He had had pain intermittently throughout the weekend. He was found to have 2 occluded vessels with thrombus and was restented. Today he started with left anterior chest pain similar to what he had several days ago and was brought by his wife to the emergency department. He was admitted for work-up. HX:   Past Medical History:   Diagnosis Date    CAD (coronary artery disease)     Diabetes ([address]     Glaucoma     right eye    Hypercholesteremia     Hypertension     Ischemic cardiomyopathy     Kidney stones     MI, old     Systolic heart failure (HCC)         DX: Angina, Recurrent NSTEMI    TX: Cardiology consultation: Diagnostic cardiac cath    Hospital course   Clinical progress has been complicated by recurrent angina. 5/7: Cardiac cath, 2 vessel CAD s/p mechanical thrombectomy/PTCA    Discharged 5/9 but returned that same evening with recurrent chest pain: Nitro and heparin gtt restarted. Diabetes    Patient has known Type 2 diabetes, treated with farxiga, lantus and glipizide PTA. Family history positive for diabetes: Mom and two sisters with Type 2 diabetes    Admission BG 86 and A1c 6.8% indicate acceptable diabetes control.      Ambulatory blood glucose management provided by primary care provider: Dottie Hester MD.    Consulted by Provider for advanced diabetes nursing assessment and care, specifically related to   [x] Inpatient management strategy  [x] Home management assessment  [x] Survival skill education    Diabetes-related medical history  Acute complications: None  Neurological complications  Peripheral neuropathy  Microvascular disease: None  Macrovascular disease  CAD and Myocardial infarction      Diabetes medication history  Drug class Currently in use Discontinued Never used   Biguanide      DDP-4 inhibitor       Sulfonylurea Glipizide 10 mg BID     Thiazolidinedione      GLP-1 RA      SGLT-2 inhibitors Farxiga 10 mg daily     Basal insulin Lantus 85 units daily     Bolus insulin      Fixed Dose  Combinations        Subjective   I am not eating that much. [facility]    Cardiology following, on heparin gtt  Remains on glipizide 10mg BID  85 Lantus daily  Correctional insulin: 0 units in last 24h  Fasting glucose 99  BG 69 at bedtime  A1C 6.8%  PCP Rosalva Newman  Low PO intake    Patient reports the following home diabetes self-care practices:  Eating pattern  [x] Breakfast Skips or scrambled eggs with bologna  [x] Lunch  [place] or leftovers  [x] Dinner  Stuffed peppers, protein/veg/starch  [x] Bedtime Limited  [x] Snacks Limited  [x] Beverages Regular Coke and sweet tea (about a gallon/day)  Physical activity pattern  [x] Aerobic exercise [place], works around the property  Monitoring pattern  [x] Does not monitor. May check once a month. No symptoms of hypoglycemia  Taking medications pattern  [x] Consistent administration  [x] Affordable       Objective   Physical exam  General Alert, oriented and in no acute distress/ill-appearing. Conversant and cooperative. Vital Signs   Visit Vitals  /64 (BP 1 Location: Right upper arm, BP Patient Position: At rest)   Pulse (!) 57   Temp 97.6 °F (36.4 °C)   Resp 14   Wt 108.5 kg (239 lb 3.2 oz)   SpO2 95%   BMI 31.56 kg/m²     Skin  Warm and dry. No acanthosis noted along neckline. No lipohypertrophy or lipoatrophy noted at injection sites   Heart   Regular rate and rhythm. No murmurs, rubs or gallops  Lungs  Clear to auscultation without rales or rhonchi  Extremities No foot wounds        Laboratory  No results found for this visit on 05/09/21 (from the past 12 hour(s)).   No results found for this visit on 05/09/21 (from the past 12 hour(s)). All inpatient labs reviewed in full    Factors impacting BG management  Factor Dose Comments   Nutrition:  Carb-controlled meals     60 grams/meal   Cardiac diet     Blood glucose pattern        Assessment and Plan   Nursing Diagnosis Risk for unstable blood glucose pattern   Nursing Intervention Domain 6916 Decision-making Support   Nursing Interventions Examined current inpatient diabetes control   Explored factors facilitating and impeding inpatient management  Identified self-management practices impeding diabetes control  Explored corrective strategies with patient and responsible inpatient provider   Informed patient of rational for insulin strategy while hospitalized     Evaluation   Cleo Recinos is a 57year old gentleman with controlled Typr 2 Diabetes on Lantus, Farixga, and Glipizide admitted with unstable angina in the setting of recurrent NSTEMI with recent stent placement. Troponin slowly rising with suspicion of graft occlusion, now on heparin gtt. A1C 6.8% and admitting glucose 86 demonstrate excellent glucose control with combination therapy. During admission, glipizide and lantus were resumed at PTA doses. Patient with marginal PO intake. Blood glucose  in the last 24 hrs. Given decreased PO intake and unclear if Mr Gregory Briones will be NPO in the next few days for any intervention- he is at higher risk for hypoglycemia with oral glipizide. Would consider holding glipizide until discharge. Inpatient glucose goal 100-180. Recommendations   1. POC glucose ACHS  2. Consistent carbohydrate/cardiac diet  3.  Hold glipizide during inpatient setting    [x] Use of Subcutaneous Insulin Order set (8095)  Insulin Dosing Specific recommendation   Continue Basal                                      (Based on weight, BMI & GFR) 85 units daily    Continue Corrective                          (Useful in adjusting insulin dosing) [x] Insulin-resistant sensitivity      Billing Code(s)   [] [id number]  Before making these care recommendations, I personally reviewed the hosptialization record, including laboratory and diagnostic data, medications and examined the patient at bedside (circumstances permitting).   Total minutes: 48      CHANTAL Valle  Diabetes Clinical Nurse Specialist  [facility]  Access via [employer]

## 2021-05-11 NOTE — PROGRESS NOTES
Brief:    No use of additional coverage for DM over past 24  hours    Pt continues on home regimen of 85 units Lantus q evening [ and SSI coverage not needed of thus far]     Cont to monitor with primary team.     Jefferson García MD 5/11/2021

## 2021-05-11 NOTE — PROGRESS NOTES
Transitions of Care Plan:  RUR:  12% - low  Clinical Plan: Heparin gtt; recurrent NSTEMI;   Consults: None  Baseline: independent without DME; resides with wife; employed  Disposition: home with wife    Reason for Readmission:     NSTEMI - recurrent         RUR Score/Risk Level:     12% - low    PCP: First and Last name:  Jazlyn Brown MD   Name of Practice:  [facility]   Are you a current patient: Yes/No: yes   Approximate date of last visit: 5/6/21   Can you participate in a virtual visit with your PCP: yes    Is a Care Conference indicated:  No    Did you attend your follow up appointment (s): If not, why not:  Yes - attended follow up appointments       Resources/supports as identified by patient/family:  Family support        Top Challenges facing patient (as identified by patient/family and CM): Finances/Medication cost?     No concerns; patient has discount cards for expensive medications (Brilinta, repatha, etc)  Transportation      Self or wife  Support system or lack thereof? Family, private car    Living arrangements? Address on file with wife  Self-care/ADLs/Cognition? Independent without DME; AOx4       Current Advanced Directive/Advance Care Plan:  Yes - wife is primary           Plan for utilizing home health:   No             Transition of Care Plan:    Based on readmission, the patient's previous Plan of Care   has been evaluated and/or modified. The current Transition of Care Plan is:           Patient readmitted to [facility] due to return of chest pain. Now on heparin gtt. Not medically stable to discharge. Plan is variable at this time. Wife state patient still has chest pain while lying down intermittent. [facility] in [place], [place] for pharmacy. CM will continue to follow. Kailey Mckeon, MPH  Care Manager [facility]  Available via [address] [place] [address] or      Care Management Interventions  PCP Verified by CM:  Yes  Palliative Care Criteria Met (RRAT>21 & CHF Dx)?: No  Mode of Transport at Discharge:  Other (see comment)(Wife, private car)  Transition of Care Consult (CM Consult): Discharge Planning  MyChart Signup: Yes  Discharge Durable Medical Equipment: No  Health Maintenance Reviewed: Yes  Physical Therapy Consult: No  Occupational Therapy Consult: No  Speech Therapy Consult: No  Current Support Network: Lives with Spouse, Own Home  Confirm Follow Up Transport: Family  Discharge Location  Discharge Placement: Home     Readmission Assessment  Number of days since last admission?: 1-7 days  Previous disposition: Home with Family  What was the patient's/caregiver's perception as to why they think they needed to return back to the hospital?: Other (Comment)(chest pain)  Did you visit your Primary Care Physician after you left the hospital, before you returned this time?: No(returned within 8 hours of discharge)  Why weren't you able to visit your PCP?: Other (Comment)(returned within 8 hours of discharge)  Did you see a specialist, such as Cardiac, Pulmonary, Orthopedic Physician, etc. after you left the hospital?: No(returned within 8 hours of discharge)  Who advised the patient to return to the hospital?: Self-referral  Does the patient report anything that got in the way of taking their medications?: No  In our efforts to provide the best possible care to you and others like you, can you think of anything that we could have done to help you after you left the hospital the first time, so that you might not have needed to return so soon?: Other (Comment)(patient states he felt fine when discharged on sunday)

## 2021-05-11 NOTE — PROGRESS NOTES
Physician Progress Note      Pedro Keys  [facility] #:                  [id number]  :                       1960  ADMIT DATE:       2021 9:44 PM  [address] [place] DATE:  RESPONDING  PROVIDER #:        Svitlana Clifton MD          QUERY TEXT:    Dear Attending,    Patient admitted with recurrent NSTEMI. Noted documentation of acute systolic CHF on  and  PN. In order to support the diagnosis of acute CHF, please include additional clinical indicators in your documentation, or please document if systolic CHF is chronic. The medical record reflects the following:  Risk Factors: CAD, CHF, DM, HTN  Clinical Indicators: pt to ED for eval of CP  - CXR with cardiomegaly- no acute process  - NT pro-BNP: 848  - documentation reflects no rales or edema  -  PN: Acute systolic heart failure  Treatment: Lasix 40 mg tablet daily, CCU monitoring      Thank you,    Heather Polanco RN  [facility]  [phone]  Options provided:  -- Acute systolic CHF currently as evidenced by, Please document evidence.   -- Acute systolic CHF ruled out after study and chronic systolic CHF confirmed  -- Other - I will add my own diagnosis  -- Disagree - Not applicable / Not valid  -- Disagree - Clinically unable to determine / Unknown  -- Refer to Clinical Documentation Reviewer    PROVIDER RESPONSE TEXT:    This patient is in acute systolic CHF as evidenced by as per note above    Query created by: Naveen Tapia on 2021 10:00 AM      Electronically signed by:  Svitlana Clifton MD 2021 3:15 PM

## 2021-05-12 ENCOUNTER — APPOINTMENT (OUTPATIENT)
Dept: CT IMAGING | Age: 61
DRG: 246 | End: 2021-05-12
Attending: INTERNAL MEDICINE
Payer: COMMERCIAL

## 2021-05-12 LAB
APTT PPP: 64.9 SEC (ref 22.1–31)
ERYTHROCYTE [DISTWIDTH] IN BLOOD BY AUTOMATED COUNT: 12.9 % (ref 11.5–14.5)
GLUCOSE BLD STRIP.AUTO-MCNC: 107 MG/DL (ref 65–117)
GLUCOSE BLD STRIP.AUTO-MCNC: 117 MG/DL (ref 65–117)
GLUCOSE BLD STRIP.AUTO-MCNC: 216 MG/DL (ref 65–117)
GLUCOSE BLD STRIP.AUTO-MCNC: 93 MG/DL (ref 65–117)
HCT VFR BLD AUTO: 42.3 % (ref 36.6–50.3)
HGB BLD-MCNC: 13.8 G/DL (ref 12.1–17)
MCH RBC QN AUTO: 29.9 PG (ref 26–34)
MCHC RBC AUTO-ENTMCNC: 32.6 G/DL (ref 30–36.5)
MCV RBC AUTO: 91.8 FL (ref 80–99)
NRBC # BLD: 0 K/UL (ref 0–0.01)
NRBC BLD-RTO: 0 PER 100 WBC
PLATELET # BLD AUTO: 289 K/UL (ref 150–400)
PMV BLD AUTO: 9.5 FL (ref 8.9–12.9)
RBC # BLD AUTO: 4.61 M/UL (ref 4.1–5.7)
SERVICE CMNT-IMP: ABNORMAL
SERVICE CMNT-IMP: NORMAL
THERAPEUTIC RANGE,PTTT: ABNORMAL SECS (ref 58–77)
TROPONIN I SERPL-MCNC: 3.03 NG/ML
TROPONIN I SERPL-MCNC: 3.19 NG/ML
WBC # BLD AUTO: 7.1 K/UL (ref 4.1–11.1)

## 2021-05-12 PROCEDURE — 85027 COMPLETE CBC AUTOMATED: CPT

## 2021-05-12 PROCEDURE — 74011250637 HC RX REV CODE- 250/637: Performed by: INTERNAL MEDICINE

## 2021-05-12 PROCEDURE — 74011636637 HC RX REV CODE- 636/637: Performed by: INTERNAL MEDICINE

## 2021-05-12 PROCEDURE — 93005 ELECTROCARDIOGRAM TRACING: CPT

## 2021-05-12 PROCEDURE — 77010033678 HC OXYGEN DAILY

## 2021-05-12 PROCEDURE — 99231 SBSQ HOSP IP/OBS SF/LOW 25: CPT | Performed by: CLINICAL NURSE SPECIALIST

## 2021-05-12 PROCEDURE — 65660000001 HC RM ICU INTERMED STEPDOWN

## 2021-05-12 PROCEDURE — 74011636637 HC RX REV CODE- 636/637: Performed by: NURSE PRACTITIONER

## 2021-05-12 PROCEDURE — 82962 GLUCOSE BLOOD TEST: CPT

## 2021-05-12 PROCEDURE — 74011250636 HC RX REV CODE- 250/636: Performed by: INTERNAL MEDICINE

## 2021-05-12 PROCEDURE — 85730 THROMBOPLASTIN TIME PARTIAL: CPT

## 2021-05-12 PROCEDURE — 84484 ASSAY OF TROPONIN QUANT: CPT

## 2021-05-12 PROCEDURE — 36415 COLL VENOUS BLD VENIPUNCTURE: CPT

## 2021-05-12 PROCEDURE — 71275 CT ANGIOGRAPHY CHEST: CPT

## 2021-05-12 PROCEDURE — 74011000250 HC RX REV CODE- 250: Performed by: INTERNAL MEDICINE

## 2021-05-12 PROCEDURE — 74011000636 HC RX REV CODE- 636: Performed by: INTERNAL MEDICINE

## 2021-05-12 RX ORDER — MORPHINE SULFATE 2 MG/ML
4 INJECTION, SOLUTION INTRAMUSCULAR; INTRAVENOUS
Status: DISCONTINUED | OUTPATIENT
Start: 2021-05-12 | End: 2021-05-14 | Stop reason: HOSPADM

## 2021-05-12 RX ORDER — AMLODIPINE BESYLATE 5 MG/1
2.5 TABLET ORAL DAILY
Status: DISCONTINUED | OUTPATIENT
Start: 2021-05-13 | End: 2021-05-12

## 2021-05-12 RX ORDER — MORPHINE SULFATE 4 MG/ML
4 INJECTION, SOLUTION INTRAMUSCULAR; INTRAVENOUS
Status: DISCONTINUED | OUTPATIENT
Start: 2021-05-12 | End: 2021-05-12 | Stop reason: SDUPTHER

## 2021-05-12 RX ORDER — AMLODIPINE BESYLATE 5 MG/1
2.5 TABLET ORAL DAILY
Status: DISCONTINUED | OUTPATIENT
Start: 2021-05-12 | End: 2021-05-14 | Stop reason: HOSPADM

## 2021-05-12 RX ADMIN — INSULIN GLARGINE 45 UNITS: 100 INJECTION, SOLUTION SUBCUTANEOUS at 22:00

## 2021-05-12 RX ADMIN — INSULIN GLARGINE 40 UNITS: 100 INJECTION, SOLUTION SUBCUTANEOUS at 22:00

## 2021-05-12 RX ADMIN — FUROSEMIDE 40 MG: 40 TABLET ORAL at 08:15

## 2021-05-12 RX ADMIN — MORPHINE SULFATE 4 MG: 4 INJECTION, SOLUTION INTRAMUSCULAR; INTRAVENOUS at 10:03

## 2021-05-12 RX ADMIN — AMLODIPINE BESYLATE 2.5 MG: 5 TABLET ORAL at 15:15

## 2021-05-12 RX ADMIN — MORPHINE SULFATE 4 MG: 2 INJECTION, SOLUTION INTRAMUSCULAR; INTRAVENOUS at 18:51

## 2021-05-12 RX ADMIN — DULOXETINE HYDROCHLORIDE 30 MG: 30 CAPSULE, DELAYED RELEASE ORAL at 08:15

## 2021-05-12 RX ADMIN — MORPHINE SULFATE 4 MG: 4 INJECTION, SOLUTION INTRAMUSCULAR; INTRAVENOUS at 00:27

## 2021-05-12 RX ADMIN — TICAGRELOR 90 MG: 90 TABLET ORAL at 12:40

## 2021-05-12 RX ADMIN — Medication 10 ML: at 22:00

## 2021-05-12 RX ADMIN — SACUBITRIL AND VALSARTAN 1 TABLET: 24; 26 TABLET, FILM COATED ORAL at 22:14

## 2021-05-12 RX ADMIN — IOPAMIDOL 80 ML: 755 INJECTION, SOLUTION INTRAVENOUS at 10:48

## 2021-05-12 RX ADMIN — Medication 2000 UNITS: at 08:15

## 2021-05-12 RX ADMIN — TICAGRELOR 90 MG: 90 TABLET ORAL at 22:18

## 2021-05-12 RX ADMIN — INSULIN LISPRO 3 UNITS: 100 INJECTION, SOLUTION INTRAVENOUS; SUBCUTANEOUS at 12:40

## 2021-05-12 RX ADMIN — Medication 10 ML: at 14:00

## 2021-05-12 RX ADMIN — RANOLAZINE 1000 MG: 500 TABLET, FILM COATED, EXTENDED RELEASE ORAL at 22:14

## 2021-05-12 RX ADMIN — ALUMINUM HYDROXIDE, MAGNESIUM HYDROXIDE, AND SIMETHICONE 40 ML: 200; 200; 20 SUSPENSION ORAL at 17:24

## 2021-05-12 RX ADMIN — METOPROLOL SUCCINATE 50 MG: 50 TABLET, EXTENDED RELEASE ORAL at 08:15

## 2021-05-12 RX ADMIN — SACUBITRIL AND VALSARTAN 1 TABLET: 24; 26 TABLET, FILM COATED ORAL at 08:15

## 2021-05-12 RX ADMIN — MORPHINE SULFATE 4 MG: 2 INJECTION, SOLUTION INTRAMUSCULAR; INTRAVENOUS at 15:16

## 2021-05-12 RX ADMIN — RANOLAZINE 1000 MG: 500 TABLET, FILM COATED, EXTENDED RELEASE ORAL at 08:15

## 2021-05-12 RX ADMIN — ASPIRIN 81 MG: 81 TABLET, COATED ORAL at 08:15

## 2021-05-12 NOTE — PROGRESS NOTES
1410: TRANSFER - IN REPORT:    Verbal report received from Espinoza Lazar (name) on Kayy Pike  being received from Sofiya RN(unit) for routine progression of care      Report consisted of patients Situation, Background, Assessment and   Recommendations(SBAR). Information from the following report(s) SBAR, Kardex, Intake/Output, MAR, Accordion, Recent Results and Cardiac Rhythm sinus pancho was reviewed with the receiving nurse. Opportunity for questions and clarification was provided. Assessment completed upon patients arrival to unit and care assumed. 1625: Patient reporting CP of 7/10. Morphine given previously at 1516. No other prn orders for CP. Brenton Hong MD.     1700Tantoña Bach with MD regarding CP. EKG and trop ordered. GI cocktail also ordered. 1719: EKG showing sinus pancho with a 1st degree AVB and BBB -- No change from previous EKG. Trop positive, but staying within same range as previous troponins. 1850: Morphine prn given d/t continued CP.     1930: Bedside shift change report given to Baldomero Aggarwal (oncoming nurse) by Sofiya RN (offgoing nurse). Report included the following information SBAR, Kardex, Intake/Output, MAR, Accordion, Recent Results and Cardiac Rhythm NSR with 1st degree AVB and BBB.

## 2021-05-12 NOTE — PROGRESS NOTES
Spiritual Care Assessment/Progress Note  [facility]      NAME: Johana Musa      MRN: [de-identified]  AGE: 57 y.o. SEX: male  Confucianist Affiliation: Sabianism   Language: English     5/12/2021           Spiritual Assessment begun in [facility] CORONARY CARE through conversation with:         []Patient        [x] Family    [] Friend(s)        Reason for Consult: Initial/Spiritual assessment, critical care     Spiritual beliefs: (Please include comment if needed)     [x] Identifies with a jovi tradition:         [] Supported by a jovi community:            [] Claims no spiritual orientation:           [] Seeking spiritual identity:                [] Adheres to an individual form of spirituality:           [] Not able to assess:                           Identified resources for coping:      [x] Prayer                               [] Music                  [] Guided Imagery     [x] Family/friends                 [] Pet visits     [] Devotional reading                         [] Unknown     [] Other:                                              Interventions offered during this visit: (See comments for more details)    Patient Interventions: Initial/Spiritual assessment, Critical care     Family/Friend(s):  Affirmation of emotions/emotional suffering, Catharsis/review of pertinent events in supportive environment, Initial Assessment, Prayer (assurance of)     Plan of Care:     [x] Support spiritual and/or cultural needs    [] Support AMD and/or advance care planning process      [] Support grieving process   [] Coordinate Rites and/or Rituals    [] Coordination with community clergy   [] No spiritual needs identified at this time   [] Detailed Plan of Care below (See Comments)  [] Make referral to Music Therapy  [] Make referral to Pet Therapy     [] Make referral to Addiction services  [] Make referral to [facility]  [] Make referral to Spiritual Care Partner  [] No future visits requested        [x] Follow up upon further referrals     Comments: Visited Mr Jayden Duran in [address] [place] [address] for initial spiritual assessment. Mr Jayden Duran was talking on his cell phone; patient's wife was also present. Provided pastoral presence and active listening as patient's wife shared that things were going pretty good. Stated they were hoping he would be able to get out of CCU today. She denied having any concerns to share and accepted 's offer to keep them in prayer. Assured her of ongoing  availability for support. : Rev. Giles Lester.  Genna Lipoma; [facility], to contact [phone] [address] [facility] call: [phone]

## 2021-05-12 NOTE — PROGRESS NOTES
0730 Bedside shift change report given to Rafa Graham RN (oncoming nurse) by Catarina Astudillo RN (offgoing nurse). Report included the following information SBAR, Kardex, Intake/Output, MAR and Recent Results. 0800  Heparin gtt stopped. 1100  Pt off unit with RN to CT. 1400 TRANSFER - OUT REPORT:    Verbal report given to Marin Hassan RN (name) on Leanne Powers  being transferred to [facility] (unit) for routine progression of care       Report consisted of patients Situation, Background, Assessment and   Recommendations(SBAR). Information from the following report(s) SBAR, Kardex, Intake/Output, MAR and Recent Results was reviewed with the receiving nurse. Lines:   Peripheral IV 05/09/21 Right Arm (Active)   Site Assessment Clean, dry, & intact 05/12/21 0800   Phlebitis Assessment 0 05/12/21 0800   Infiltration Assessment 0 05/12/21 0800   Dressing Status Clean, dry, & intact 05/12/21 0800   Dressing Type Transparent 05/12/21 0800   Hub Color/Line Status Green;Capped 05/12/21 0800   Action Taken Open ports on tubing capped 05/12/21 0800   Alcohol Cap Used Yes 05/12/21 0800       Peripheral IV 05/11/21 Distal;Left;Posterior Wrist (Active)   Site Assessment Clean, dry, & intact 05/12/21 0800   Phlebitis Assessment 0 05/12/21 0800   Infiltration Assessment 0 05/12/21 0800   Dressing Status Clean, dry, & intact 05/12/21 0800   Dressing Type Transparent 05/12/21 0800   Hub Color/Line Status Blue;Capped 05/12/21 0800   Action Taken Open ports on tubing capped 05/12/21 0800   Alcohol Cap Used Yes 05/12/21 0800        Opportunity for questions and clarification was provided.       Patient transported with:   Monitor  O2 @ 2 liters  Registered Nurse

## 2021-05-12 NOTE — DIABETES MGMT
[id number]    CLINICAL NURSE SPECIALIST CONSULT  FOLLOW UP NOTE     Initial Presentation   Oh Mensah is a 57 y.o. male who presented to the ED 5/7/21 with a 30 minute complaint of chest pain. Four days prior, the patient presented with left anterior chest pain and a nonacute EKG. His troponin was noted to be 17. He had had pain intermittently throughout the weekend. He was found to have 2 occluded vessels with thrombus and was restented. Today he started with left anterior chest pain similar to what he had several days ago and was brought by his wife to the emergency department. He was admitted for work-up. HX:   Past Medical History:   Diagnosis Date    CAD (coronary artery disease)     Diabetes ([address]     Glaucoma     right eye    Hypercholesteremia     Hypertension     Ischemic cardiomyopathy     Kidney stones     MI, old     Systolic heart failure (HCC)         DX: Angina, Recurrent NSTEMI    TX: Cardiology consultation: Diagnostic cardiac cath    Hospital course   Clinical progress has been complicated by recurrent angina. 5/7: Cardiac cath, 2 vessel CAD s/p mechanical thrombectomy/PTCA    Discharged 5/9 but returned that same evening with recurrent chest pain: Nitro and heparin gtt restarted. Diabetes    Patient has known Type 2 diabetes, treated with farxiga, lantus and glipizide PTA. Family history positive for diabetes: Mom and two sisters with Type 2 diabetes    Admission BG 86 and A1c 6.8% indicate acceptable diabetes control.      Ambulatory blood glucose management provided by primary care provider: Jonel Gray MD.    Consulted by Provider for advanced diabetes nursing assessment and care, specifically related to   [x] Inpatient management strategy  [x] Home management assessment  [x] Survival skill education    Diabetes-related medical history  Acute complications: None  Neurological complications  Peripheral neuropathy  Microvascular disease: None  Macrovascular disease  CAD and Myocardial infarction      Diabetes medication history  Drug class Currently in use Discontinued Never used   Biguanide      DDP-4 inhibitor       Sulfonylurea Glipizide 10 mg BID     Thiazolidinedione      GLP-1 RA      SGLT-2 inhibitors Farxiga 10 mg daily     Basal insulin Lantus 85 units daily     Bolus insulin      Fixed Dose  Combinations        Subjective   I am not eating that much. [facility]    Cardiology following, on heparin gtt  Glipizide 10mg BID- d/c  85 Lantus daily  Correctional insulin: 0 units in last 24h  Fasting glucose 107  24 hr glucose pattern:   A1C 6.8%  PCP Quan Rosey  Low PO intake    Objective   Physical exam  General Alert, oriented and in no acute distress/ill-appearing. Conversant and cooperative. Vital Signs   Visit Vitals  BP 93/69   Pulse 60   Temp 97.9 °F (36.6 °C)   Resp 12   Wt 108.5 kg (239 lb 3.2 oz)   SpO2 93%   BMI 31.56 kg/m²     Skin  Warm and dry. No acanthosis noted along neckline. No lipohypertrophy or lipoatrophy noted at injection sites   Heart   Regular rate and rhythm. No murmurs, rubs or gallops  Lungs  Clear to auscultation without rales or rhonchi  Extremities No foot wounds        Laboratory      CBC W/O DIFF    Collection Time: 05/12/21  6:08 AM   Result Value Ref Range    WBC 7.1 4.1 - 11.1 K/uL    RBC 4.61 4.10 - 5.70 M/uL    HGB 13.8 12.1 - 17.0 g/dL    HCT 42.3 36.6 - 50.3 %    MCV 91.8 80.0 - 99.0 FL    MCH 29.9 26.0 - 34.0 PG    MCHC 32.6 30.0 - 36.5 g/dL    RDW 12.9 11.5 - 14.5 %    PLATELET 567 896 - 140 K/uL    MPV 9.5 8.9 - 12.9 FL    NRBC 0.0 0  WBC    ABSOLUTE NRBC 0.00 0.00 - 0.01 K/uL     No results found for this visit on 05/09/21 (from the past 12 hour(s)).   All inpatient labs reviewed in full    Factors impacting BG management  Factor Dose Comments   Nutrition:  Carb-controlled meals     60 grams/meal   Cardiac diet     Blood glucose pattern        Assessment and Plan   Nursing Diagnosis Risk for unstable blood glucose pattern   Nursing Intervention Domain 9698 Decision-making Support   Nursing Interventions Examined current inpatient diabetes control   Explored factors facilitating and impeding inpatient management  Identified self-management practices impeding diabetes control  Explored corrective strategies with patient and responsible inpatient provider   Informed patient of rational for insulin strategy while hospitalized     Evaluation   Mya Mckoy is a 57year old gentleman with controlled Type 2 Diabetes on Lantus, Farixga, and Glipizide admitted with unstable angina in the setting of recurrent NSTEMI with recent stent placement. Troponin slowly rising with suspicion of graft occlusion, now on heparin gtt. A1C 6.8% and admitting glucose 86 demonstrate excellent glucose control with combination therapy. During admission, glipizide and lantus were resumed at PTA doses but glipizide d/c due to marginal PO intake. Blood glucose  in the last 24 hrs. Inpatient glucose goal 100-180. Recommendations   1. POC glucose ACHS  2. Consistent carbohydrate/cardiac diet    [x] Use of Subcutaneous Insulin Order set (0478)  Insulin Dosing Specific recommendation   Continue Basal                                      (Based on weight, BMI & GFR) 85 units daily If fasting glucose   consistently below 100,   reduce Lantus to 75 units   daily   Continue Corrective                          (Useful in adjusting insulin dosing) [x] Insulin-resistant sensitivity      Billing Code(s)   [x] 62031  Before making these care recommendations, I personally reviewed the hosptialization record, including laboratory and diagnostic data, medications and examined the patient at bedside (circumstances permitting).   Total minutes: 13      Mahesh Shepherd, CNS  Diabetes Clinical Nurse Specialist  [facility]  Access via [employer]

## 2021-05-12 NOTE — PROGRESS NOTES
Problem: Falls - Risk of  Goal: *Absence of Falls  Description: Document Bose Tyrone Fall Risk and appropriate interventions in the flowsheet. Outcome: Progressing Towards Goal  Note: Fall Risk Interventions:  Mobility Interventions: Patient to call before getting OOB, Communicate number of staff needed for ambulation/transfer         Medication Interventions: Teach patient to arise slowly    Elimination Interventions: Call light in reach, Toileting schedule/hourly rounds, Urinal in reach              Problem: Patient Education: Go to Patient Education Activity  Goal: Patient/Family Education  Outcome: Progressing Towards Goal     Problem: Pressure Injury - Risk of  Goal: *Prevention of pressure injury  Description: Document Carlos Scale and appropriate interventions in the flowsheet.   Outcome: Progressing Towards Goal  Note: Pressure Injury Interventions:  Sensory Interventions: Minimize linen layers, Maintain/enhance activity level, Keep linens dry and wrinkle-free         Activity Interventions: Pressure redistribution bed/mattress(bed type), Increase time out of bed    Mobility Interventions: HOB 30 degrees or less, Pressure redistribution bed/mattress (bed type)    Nutrition Interventions: Discuss nutritional consult with provider, Document food/fluid/supplement intake                     Problem: Patient Education: Go to Patient Education Activity  Goal: Patient/Family Education  Outcome: Progressing Towards Goal     Problem: Unstable angina/NSTEMI: Day 2  Goal: Diagnostic Test/Procedures  Outcome: Progressing Towards Goal  Goal: *Hemodynamically stable  Outcome: Progressing Towards Goal  Goal: *Lungs clear or at baseline  Outcome: Progressing Towards Goal

## 2021-05-12 NOTE — PROGRESS NOTES
1930: Bedside shift report received from [facility].. 2230: ptt and troponin level sent to lab. 0030: ptt 52.7- Increased heparin gtt per protocol. Morphine given for chest pain. 8847: ptt and troponin drawn. 0730: Bedside and Verbal shift change report given to [facility]. (oncoming nurse) by [facility] (offgoing nurse). Report included the following information SBAR, Kardex, Procedure Summary, Intake/Output, MAR, Accordion, Recent Results and Cardiac Rhythm nsr.

## 2021-05-12 NOTE — PROGRESS NOTES
Discussed with nurse that patient reported increased chest pain. EKG repeated  Tracing reviewed: unchanged, with NSR, old inferior MI pattern, RBBB. No new ischemic changes. CTA chest was negative for PE and there was no mention of aortic dissection. Etiology for his lingering chest pain is uncertain. Continue cardiac meds  GI cocktail suggested. PRN analgesia.

## 2021-05-13 LAB
ANION GAP SERPL CALC-SCNC: 4 MMOL/L (ref 5–15)
ATRIAL RATE: 59 BPM
BUN SERPL-MCNC: 19 MG/DL (ref 6–20)
BUN/CREAT SERPL: 20 (ref 12–20)
CALCIUM SERPL-MCNC: 8.7 MG/DL (ref 8.5–10.1)
CALCULATED P AXIS, ECG09: 55 DEGREES
CALCULATED R AXIS, ECG10: -2 DEGREES
CALCULATED T AXIS, ECG11: -22 DEGREES
CHLORIDE SERPL-SCNC: 106 MMOL/L (ref 97–108)
CO2 SERPL-SCNC: 29 MMOL/L (ref 21–32)
COMMENT, HOLDF: NORMAL
CREAT SERPL-MCNC: 0.94 MG/DL (ref 0.7–1.3)
DIAGNOSIS, 93000: NORMAL
GLUCOSE BLD STRIP.AUTO-MCNC: 117 MG/DL (ref 65–117)
GLUCOSE BLD STRIP.AUTO-MCNC: 155 MG/DL (ref 65–117)
GLUCOSE BLD STRIP.AUTO-MCNC: 53 MG/DL (ref 65–117)
GLUCOSE BLD STRIP.AUTO-MCNC: 94 MG/DL (ref 65–117)
GLUCOSE BLD STRIP.AUTO-MCNC: 95 MG/DL (ref 65–117)
GLUCOSE SERPL-MCNC: 61 MG/DL (ref 65–100)
P-R INTERVAL, ECG05: 226 MS
POTASSIUM SERPL-SCNC: 3.6 MMOL/L (ref 3.5–5.1)
Q-T INTERVAL, ECG07: 510 MS
QRS DURATION, ECG06: 162 MS
QTC CALCULATION (BEZET), ECG08: 504 MS
SAMPLES BEING HELD,HOLD: NORMAL
SERVICE CMNT-IMP: ABNORMAL
SERVICE CMNT-IMP: ABNORMAL
SERVICE CMNT-IMP: NORMAL
SODIUM SERPL-SCNC: 139 MMOL/L (ref 136–145)
VENTRICULAR RATE, ECG03: 59 BPM

## 2021-05-13 PROCEDURE — 82962 GLUCOSE BLOOD TEST: CPT

## 2021-05-13 PROCEDURE — 80048 BASIC METABOLIC PNL TOTAL CA: CPT

## 2021-05-13 PROCEDURE — 65660000001 HC RM ICU INTERMED STEPDOWN

## 2021-05-13 PROCEDURE — 74011250637 HC RX REV CODE- 250/637: Performed by: INTERNAL MEDICINE

## 2021-05-13 PROCEDURE — 36415 COLL VENOUS BLD VENIPUNCTURE: CPT

## 2021-05-13 PROCEDURE — 99231 SBSQ HOSP IP/OBS SF/LOW 25: CPT | Performed by: CLINICAL NURSE SPECIALIST

## 2021-05-13 PROCEDURE — 74011636637 HC RX REV CODE- 636/637: Performed by: FAMILY MEDICINE

## 2021-05-13 PROCEDURE — 94760 N-INVAS EAR/PLS OXIMETRY 1: CPT

## 2021-05-13 PROCEDURE — 74011636637 HC RX REV CODE- 636/637: Performed by: INTERNAL MEDICINE

## 2021-05-13 RX ORDER — INSULIN GLARGINE 100 [IU]/ML
65 INJECTION, SOLUTION SUBCUTANEOUS
Status: DISCONTINUED | OUTPATIENT
Start: 2021-05-13 | End: 2021-05-14 | Stop reason: HOSPADM

## 2021-05-13 RX ORDER — COLCHICINE 0.6 MG/1
0.6 TABLET ORAL DAILY
Status: DISCONTINUED | OUTPATIENT
Start: 2021-05-13 | End: 2021-05-14 | Stop reason: HOSPADM

## 2021-05-13 RX ORDER — INSULIN GLARGINE 100 [IU]/ML
20 INJECTION, SOLUTION SUBCUTANEOUS
Status: DISCONTINUED | OUTPATIENT
Start: 2021-05-13 | End: 2021-05-13

## 2021-05-13 RX ADMIN — TICAGRELOR 90 MG: 90 TABLET ORAL at 21:39

## 2021-05-13 RX ADMIN — AMLODIPINE BESYLATE 2.5 MG: 5 TABLET ORAL at 09:13

## 2021-05-13 RX ADMIN — Medication 2000 UNITS: at 09:13

## 2021-05-13 RX ADMIN — COLCHICINE 0.6 MG: 0.6 TABLET, FILM COATED ORAL at 09:13

## 2021-05-13 RX ADMIN — RANOLAZINE 1000 MG: 500 TABLET, FILM COATED, EXTENDED RELEASE ORAL at 21:27

## 2021-05-13 RX ADMIN — METOPROLOL SUCCINATE 50 MG: 50 TABLET, EXTENDED RELEASE ORAL at 09:13

## 2021-05-13 RX ADMIN — RANOLAZINE 1000 MG: 500 TABLET, FILM COATED, EXTENDED RELEASE ORAL at 09:13

## 2021-05-13 RX ADMIN — Medication 10 ML: at 07:10

## 2021-05-13 RX ADMIN — INSULIN GLARGINE 65 UNITS: 100 INJECTION, SOLUTION SUBCUTANEOUS at 21:37

## 2021-05-13 RX ADMIN — INSULIN LISPRO 2 UNITS: 100 INJECTION, SOLUTION INTRAVENOUS; SUBCUTANEOUS at 11:32

## 2021-05-13 RX ADMIN — FUROSEMIDE 40 MG: 40 TABLET ORAL at 09:13

## 2021-05-13 RX ADMIN — Medication 10 ML: at 21:37

## 2021-05-13 RX ADMIN — SACUBITRIL AND VALSARTAN 1 TABLET: 24; 26 TABLET, FILM COATED ORAL at 09:13

## 2021-05-13 RX ADMIN — ASPIRIN 81 MG: 81 TABLET, COATED ORAL at 09:13

## 2021-05-13 RX ADMIN — DULOXETINE HYDROCHLORIDE 30 MG: 30 CAPSULE, DELAYED RELEASE ORAL at 09:13

## 2021-05-13 RX ADMIN — SACUBITRIL AND VALSARTAN 1 TABLET: 24; 26 TABLET, FILM COATED ORAL at 21:27

## 2021-05-13 RX ADMIN — TICAGRELOR 90 MG: 90 TABLET ORAL at 11:25

## 2021-05-13 RX ADMIN — Medication 10 ML: at 15:19

## 2021-05-13 NOTE — PROGRESS NOTES
1930: Bedside and Verbal shift change report given to Raven Granados RN (oncoming nurse) by Itz North RN (offgoing nurse). Report included the following information SBAR, Kardex, ED Summary, Procedure Summary, Intake/Output, MAR, Recent Results and Med Rec Status. 0730: Bedside and Verbal shift change report given to Itz North RN (oncoming nurse) by Raven Granados RN (offgoing nurse). Report included the following information SBAR, Kardex, Procedure Summary, Intake/Output, MAR, Recent Results and Med Rec Status.

## 2021-05-13 NOTE — PROGRESS NOTES
[facility]                                                                                          Hospitalist Progress Note  Deisy Lombardi MD  Answering service: [phone] OR [phone] from in house phone        Date of Service:  2021  NAME:  Perry January  :  1960  MRN:  [id number]      Admission Summary:   Pt admitted with unstable angina, recurrent NSTEMI      Interval history / Subjective:     2021 :    BS's low lab pm    Insulin's down adjusted    Pt w/o cp today   Holly Camacho as below        Assessment & Plan:     1. Recurrent NSTEMI  Still having lingering chest pain at times  ? Element of post MI pericarditis complicating things  CTA on  negative for PE  - no CP 2021      2. CAD with h/o CABG with unstable angina. As above, suspect recently stented SVG to RCA may have occluded. - no cp 2021      3. Acute systolic heart failure  No overt fluid overload currently. Oxygenating fairly well on NC oxygen  - compensated 2021      4. Ischemic cardiomyopathy: EF 45-50%     5. Type II diabetes with other circulatory complication  - insulins's adjusted down  as low bs's last PM      6. HTN  Pressure intermittently low, limiting anti-anginal therapy  - last 110 syst 2021         Hospital Problems  Date Reviewed: 5/10/2021          Codes Class Noted POA    Coronary artery disease involving coronary bypass graft of native heart with unstable angina pectoris [facility]) ICD-10-CM: I25.700  ICD-9-CM: 414.05, 411.1  2021 Unknown                  After personally interviewing pt at bedside the following is noted on 2021 :    Review of Systems   Constitutional: Negative. HENT: Negative. Eyes: Negative. Respiratory: Negative. Cardiovascular: Negative. Gastrointestinal: Negative. Genitourinary: Negative. Skin: Negative. Neurological: Negative.                I had a face to face encounter with patient on 2021 at bedside for the following physical exam:     PHYSICAL EXAM:    Visit Vitals  /61 (BP 1 Location: Left arm, BP Patient Position: At rest)   Pulse 62   Temp 98.2 °F (36.8 °C)   Resp 19   Wt 110.3 kg (243 lb 2.7 oz)   SpO2 94%   BMI 32.08 kg/m²          Physical Exam  Constitutional:       Appearance: Normal appearance. HENT:      Right Ear: External ear normal.      Left Ear: External ear normal.      Mouth/Throat:      Mouth: Mucous membranes are moist.      Pharynx: Oropharynx is clear. Eyes:      Conjunctiva/sclera: Conjunctivae normal.      Pupils: Pupils are equal, round, and reactive to light. Cardiovascular:      Rate and Rhythm: Regular rhythm. Tachycardia present. Pulmonary:      Effort: Pulmonary effort is normal. No respiratory distress. Musculoskeletal:         General: No swelling or tenderness. Skin:     Coloration: Skin is not jaundiced or pale. Neurological:      General: No focal deficit present. Mental Status: He is oriented to person, place, and time. Psychiatric:         Mood and Affect: Mood normal.         Thought Content: Thought content normal.                     Data Review:    Review and/or order of clinical lab test      Labs:     Recent Labs     05/12/21  0608   WBC 7.1   HGB 13.8   HCT 42.3        Recent Labs     05/13/21  0331 05/11/21  0312    139   K 3.6 3.7    105   CO2 29 27   BUN 19 20   CREA 0.94 0.86   GLU 61* 155*   CA 8.7 8.7     No results for input(s): ALT, AP, TBIL, TBILI, TP, ALB, GLOB, GGT, AML, LPSE in the last 72 hours. No lab exists for component: SGOT, GPT, AMYP, HLPSE  Recent Labs     05/12/21  0608 05/11/21 2235 05/11/21  1540   APTT 64.9* 52.7* 58.4*      No results for input(s): FE, TIBC, PSAT, FERR in the last 72 hours. No results found for: FOL, RBCF   No results for input(s): PH, PCO2, PO2 in the last 72 hours.   Recent Labs     05/12/21  1718 05/12/21  0608 05/11/21  2235   TROIQ 3.03* 3.19* 2.99*     Lab Results   Component Value Date/Time    Cholesterol, total 66 02/21/2019 06:13 AM    HDL Cholesterol 29 02/21/2019 06:13 AM    LDL,Direct 144 (H) 05/20/2018 02:11 AM    LDL, calculated NEG 13.6 02/21/2019 06:13 AM    Triglyceride 253 (H) 02/21/2019 06:13 AM    CHOL/HDL Ratio 2.3 02/21/2019 06:13 AM     Lab Results   Component Value Date/Time    Glucose (POC) 155 (H) 05/13/2021 11:24 AM    Glucose (POC) 94 05/13/2021 07:32 AM    Glucose (POC) 53 (LL) 05/13/2021 07:12 AM    Glucose (POC) 93 05/12/2021 10:00 PM    Glucose (POC) 117 05/12/2021 05:29 PM     No results found for: COLOR, APPRN, SPGRU, REFSG, XANDER, PROTU, GLUCU, KETU, BILU, UROU, TIESHA, LEUKU, GLUKE, EPSU, BACTU, WBCU, RBCU, CASTS, UCRY      Medications Reviewed:     Current Facility-Administered Medications   Medication Dose Route Frequency    colchicine tablet 0.6 mg  0.6 mg Oral DAILY    insulin glargine (LANTUS) injection 65 Units  65 Units SubCUTAneous QHS    amLODIPine (NORVASC) tablet 2.5 mg  2.5 mg Oral DAILY    morphine injection 4 mg  4 mg IntraVENous Q3H PRN    ranolazine ER (RANEXA) tablet 1,000 mg  1,000 mg Oral BID    furosemide (LASIX) tablet 40 mg  40 mg Oral DAILY    insulin lispro (HUMALOG) injection   SubCUTAneous AC&HS    glucose chewable tablet 16 g  4 Tab Oral PRN    dextrose (D50W) injection syrg 12.5-25 g  12.5-25 g IntraVENous PRN    glucagon (GLUCAGEN) injection 1 mg  1 mg IntraMUSCular PRN    sodium chloride (NS) flush 5-40 mL  5-40 mL IntraVENous Q8H    sodium chloride (NS) flush 5-40 mL  5-40 mL IntraVENous PRN    acetaminophen (TYLENOL) tablet 650 mg  650 mg Oral Q6H PRN    Or    acetaminophen (TYLENOL) suppository 650 mg  650 mg Rectal Q6H PRN    polyethylene glycol (MIRALAX) packet 17 g  17 g Oral DAILY PRN    promethazine (PHENERGAN) tablet 12.5 mg  12.5 mg Oral Q6H PRN    Or    ondansetron (ZOFRAN) injection 4 mg  4 mg IntraVENous Q6H PRN    aspirin delayed-release tablet 81 mg  81 mg Oral DAILY    cholecalciferol (VITAMIN D3) (1000 Units /25 mcg) tablet 2,000 Units  2,000 Units Oral DAILY    DULoxetine (CYMBALTA) capsule 30 mg  30 mg Oral DAILY    metoprolol succinate (TOPROL-XL) XL tablet 50 mg  50 mg Oral DAILY    sacubitriL-valsartan (ENTRESTO) 24-26 mg tablet 1 Tab  1 Tab Oral Q12H    ticagrelor (BRILINTA) tablet 90 mg  90 mg Oral Q12H     ______________________________________________________________________  EXPECTED LENGTH OF STAY: 4d 2h  ACTUAL LENGTH OF STAY:          4                 Celeste Greenfield MD

## 2021-05-13 NOTE — PROGRESS NOTES
[facility] Cardiology Progress Note    Date of service: 5/13/2021    Subjective:   Pain free currently  Had some CP last evening, but felt good overnight  Sleepy this morning    Objective:    Visit Vitals  BP (!) 114/56 (BP 1 Location: Left arm, BP Patient Position: At rest)   Pulse 65   Temp 98 °F (36.7 °C)   Resp 18   Wt 110.3 kg (243 lb 2.7 oz)   SpO2 96%   BMI 32.08 kg/m²       Physical Exam   Constitutional: He is oriented to person, place, and time. He appears well-developed and well-nourished. HENT:   Head: Normocephalic and atraumatic. Eyes: Conjunctivae are normal. No scleral icterus. Neck: No JVD present. Cardiovascular: Normal rate and regular rhythm. Exam reveals no gallop. Murmur heard. Harsh midsystolic murmur is present with a grade of 2/6 at the upper right sternal border radiating to the neck. Pulmonary/Chest: Effort normal and breath sounds normal. No stridor. No respiratory distress. He has no wheezes. He has no rales. Abdominal: Soft. He exhibits no distension. Musculoskeletal:         General: No deformity or edema. Neurological: He is alert and oriented to person, place, and time. Skin: Skin is warm and dry. Psychiatric: He has a normal mood and affect.  His behavior is normal.       Data reviewed:  Recent Results (from the past 12 hour(s))   METABOLIC PANEL, BASIC    Collection Time: 05/13/21  3:31 AM   Result Value Ref Range    Sodium 139 136 - 145 mmol/L    Potassium 3.6 3.5 - 5.1 mmol/L    Chloride 106 97 - 108 mmol/L    CO2 29 21 - 32 mmol/L    Anion gap 4 (L) 5 - 15 mmol/L    Glucose 61 (L) 65 - 100 mg/dL    BUN 19 6 - 20 MG/DL    Creatinine 0.94 0.70 - 1.30 MG/DL    BUN/Creatinine ratio 20 12 - 20      GFR est AA >60 >60 ml/min/1.73m2    GFR est non-AA >60 >60 ml/min/1.73m2    Calcium 8.7 8.5 - 10.1 MG/DL   SAMPLES BEING HELD    Collection Time: 05/13/21  3:31 AM   Result Value Ref Range    SAMPLES BEING HELD  1 lav     COMMENT        Add-on orders for these samples will be processed based on acceptable specimen integrity and analyte stability, which may vary by analyte. GLUCOSE, POC    Collection Time: 05/13/21  7:12 AM   Result Value Ref Range    Glucose (POC) 53 (LL) 65 - 117 mg/dL    Performed by RIVAS BECK    GLUCOSE, POC    Collection Time: 05/13/21  7:32 AM   Result Value Ref Range    Glucose (POC) 94 65 - 117 mg/dL    Performed by RIVAS BECK      Cath films reviewed  -occluded SVG to RCA recanalized with mechanical thrombectomy and multiple SANTOS by Dr Rocio Peralta. -excellent result achieved despite very challenging anatomy. SUNIL 3 final flow    EKG reviewed:  -NSR with RBBB, inferior MI pattern    Telemetry:  NSR    05/03/21   ECHO ADULT COMPLETE 05/04/2021 5/4/2021    Narrative · Image quality for this study was technically difficult. · Contrast used: DEFINITY. · LV: Estimated LVEF is 45 - 50%. Normal wall thickness. Dilated left   ventricle. Inconclusive left ventricular diastolic function. · MV: Mitral valve non-specific thickening. Mild to moderate mitral valve   regurgitation is present. · LA: Dilated left atrium. Signed by: Iris Bowden MD         Assessment:    1. Recurrent NSTEMI  Still having lingering chest pain at times  ? Element of post MI pericarditis complicating things  CTA on 5/12 negative for PE    2. CAD with h/o CABG with unstable angina. As above, suspect recently stented SVG to RCA may have occluded. 3. Acute systolic heart failure  No overt fluid overload currently. Oxygenating fairly well on [place] oxygen    4. Ischemic cardiomyopathy: EF 45-50%    5. 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  HTN  Pressure intermittently low, limiting anti-anginal therapy    Plan:    Continue ASA, Brilinta  Continue amlodipine and Ranexa, beta blocker    Add colchicine once daily in case there is a component of pericarditis  Ambulate as tolerated  Possible discharge tomorrow if pain free and doing well the next 24hrs      Signed:  Yaritza Boothe MD  Interventional Cardiology  5/13/2021

## 2021-05-13 NOTE — PROGRESS NOTES
0730: Bedside shift change report given to Tammi Moura (oncoming nurse) by Seferino Ochoa RN (offgoing nurse). Report included the following information SBAR, Kardex, Intake/Output, MAR, Accordion, Recent Results, and Cardiac Rhythm NSR .     1230: Patient ambulated in hallway, aprox 140 ft. Patient reports no chest pain after exertion. 1518: Patient ambulated in hallway, aprox 140 ft. No chest pain after exertion. 1930: Bedside shift change report given to Baldomero Aggarwal (oncoming nurse) by Marin Hassan RN (offgoing nurse). Report included the following information SBAR, Kardex, Intake/Output, MAR, Accordion, Recent Results and Cardiac Rhythm NSR. Problem: Falls - Risk of  Goal: *Absence of Falls  Description: Document Miky De Fall Risk and appropriate interventions in the flowsheet. Outcome: Progressing Towards Goal  Note: Fall Risk Interventions:  Mobility Interventions: Patient to call before getting OOB, Communicate number of staff needed for ambulation/transfer         Medication Interventions: Teach patient to arise slowly    Elimination Interventions: Call light in reach, Toileting schedule/hourly rounds, Urinal in reach              Problem: Patient Education: Go to Patient Education Activity  Goal: Patient/Family Education  Outcome: Progressing Towards Goal     Problem: Pressure Injury - Risk of  Goal: *Prevention of pressure injury  Description: Document Carlos Scale and appropriate interventions in the flowsheet.   Outcome: Progressing Towards Goal  Note: Pressure Injury Interventions:  Sensory Interventions: Minimize linen layers, Maintain/enhance activity level, Keep linens dry and wrinkle-free         Activity Interventions: Increase time out of bed    Mobility Interventions: HOB 30 degrees or less, Pressure redistribution bed/mattress (bed type)    Nutrition Interventions: Document food/fluid/supplement intake                     Problem: Patient Education: Go to Patient Education Activity  Goal: Patient/Family Education  Outcome: Progressing Towards Goal     Problem: Pain  Goal: *Control of Pain  Outcome: Progressing Towards Goal     Problem: Patient Education: Go to Patient Education Activity  Goal: Patient/Family Education  Outcome: Progressing Towards Goal     Problem: Unstable angina/NSTEMI: Day 2  Goal: Off Pathway (Use only if patient is Off Pathway)  Outcome: Progressing Towards Goal  Goal: Nutrition/Diet  Outcome: Progressing Towards Goal  Goal: Medications  Outcome: Progressing Towards Goal  Goal: Treatments/Interventions/Procedures  Outcome: Progressing Towards Goal  Goal: *Lungs clear or at baseline  Outcome: Progressing Towards Goal     Problem: Unstable Angina/NSTEMI: Discharge Outcomes  Goal: *Understands and describes signs and symptoms to report to providers(Stroke Metric)  Outcome: Progressing Towards Goal Fair

## 2021-05-13 NOTE — DIABETES MGMT
[address] [facility]    CLINICAL NURSE SPECIALIST CONSULT  FOLLOW UP NOTE     Initial Presentation   Ember Hart is a 57 y.o. male who presented to the ED 5/7/21 with a 30 minute complaint of chest pain. Four days prior, the patient presented with left anterior chest pain and a nonacute EKG. His troponin was noted to be 17. He had had pain intermittently throughout the weekend. He was found to have 2 occluded vessels with thrombus and was restented. Today he started with left anterior chest pain similar to what he had several days ago and was brought by his wife to the emergency department. He was admitted for work-up. HX:   Past Medical History:   Diagnosis Date    CAD (coronary artery disease)     Diabetes ([facility] [address]     Glaucoma     right eye    Hypercholesteremia     Hypertension     Ischemic cardiomyopathy     Kidney stones     MI, old     Systolic heart failure (HCC)         DX: Angina, Recurrent NSTEMI    TX: Cardiology consultation: Diagnostic cardiac cath    Hospital course   Clinical progress has been complicated by recurrent angina. 5/7: Cardiac cath, 2 vessel CAD s/p mechanical thrombectomy/PTCA    Discharged 5/9 but returned that same evening with recurrent chest pain: Nitro and heparin gtt restarted. Diabetes    Patient has known Type 2 diabetes, treated with farxiga, lantus and glipizide PTA. Family history positive for diabetes: Mom and two sisters with Type 2 diabetes    Admission BG 86 and A1c 6.8% indicate acceptable diabetes control.      Ambulatory blood glucose management provided by primary care provider: Mark Bui MD.    Consulted by Provider for advanced diabetes nursing assessment and care, specifically related to   [x] Inpatient management strategy  [x] Home management assessment  [x] Survival skill education    Diabetes-related medical history  Acute complications: None  Neurological complications  Peripheral neuropathy  Microvascular disease: None  Macrovascular disease  CAD and Myocardial infarction      Diabetes medication history  Drug class Currently in use Discontinued Never used   Biguanide      DDP-4 inhibitor       Sulfonylurea Glipizide 10 mg BID     Thiazolidinedione      GLP-1 RA      SGLT-2 inhibitors Farxiga 10 mg daily     Basal insulin Lantus 85 units daily     Bolus insulin      Fixed Dose  Combinations        Subjective   I think the insulin reduction is too much.     Continued lingering chest pain  CTA negative for PE/dissection  EKG unchanged      Cardiology following, on heparin gtt  Glipizide 10mg BID- d/c    85 Lantus daily  Correctional insulin: 0 units in last 24h  Fasting glucose 61  24 hr glucose pattern:   A1C 6.8%  PCP Arlyccharisse Rosey  Low PO intake    Objective   Physical exam  General Alert, oriented and in no acute distress/ill-appearing. Conversant and cooperative. Vital Signs   Visit Vitals  BP (!) 114/56 (BP 1 Location: Left arm, BP Patient Position: At rest)   Pulse 65   Temp 98 °F (36.7 °C)   Resp 18   Wt 110.3 kg (243 lb 2.7 oz)   SpO2 96%   BMI 32.08 kg/m²     Skin  Warm and dry. No acanthosis noted along neckline. No lipohypertrophy or lipoatrophy noted at injection sites   Heart   Regular rate and rhythm. No murmurs, rubs or gallops  Lungs  Clear to auscultation without rales or rhonchi  Extremities No foot wounds        Laboratory  No results found for this visit on 05/09/21 (from the past 12 hour(s)). No results found for this visit on 05/09/21 (from the past 12 hour(s)).   All inpatient labs reviewed in full    Factors impacting BG management  Factor Dose Comments   Nutrition:  Carb-controlled meals     60 grams/meal   Cardiac diet     Blood glucose pattern        Assessment and Plan   Nursing Diagnosis Risk for unstable blood glucose pattern   Nursing Intervention Domain 5561 Decision-making Support   Nursing Interventions Examined current inpatient diabetes control   Explored factors facilitating and impeding inpatient management  Identified self-management practices impeding diabetes control  Explored corrective strategies with patient and responsible inpatient provider   Informed patient of rational for insulin strategy while hospitalized     Evaluation   Hayder Chu is a 57year old gentleman with controlled Type 2 Diabetes on Lantus, Farixga, and Glipizide admitted with unstable angina in the setting of recurrent NSTEMI with recent stent placement. Troponin slowly rising with suspicion of graft occlusion, now on heparin gtt. A1C 6.8% and admitting glucose 86 demonstrate excellent glucose control with combination therapy. During admission, glipizide and lantus were resumed at PTA doses but glipizide d/c due to marginal PO intake. Blood glucose  in the last 24 hrs but fasting glucose of 61 indicates a reduction in basal insulin is needed. Inpatient glucose goal 100-180. Recommendations   1. POC glucose ACHS  2. Consistent carbohydrate/cardiac diet    [x] Use of Subcutaneous Insulin Order set (1444)  Insulin Dosing Specific recommendation   Reduce Basal                                      (Based on weight, BMI & GFR) 65 units daily. May likely need more. If fasting glucose   consistently below 100,   reduce Lantus by additional   20%   Continue Corrective                          (Useful in adjusting insulin dosing) [x] Insulin-resistant sensitivity      Billing Code(s)   [x] 65346  Before making these care recommendations, I personally reviewed the hosptialization record, including laboratory and diagnostic data, medications and examined the patient at bedside (circumstances permitting).   Total minutes: 21      CHANTAL Rosas  Diabetes Clinical Nurse Specialist  [facility]  Access via [email or web address]

## 2021-05-13 NOTE — PROGRESS NOTES
Cont to follow pt for DM    [facility]  Lab Results   Component Value Date/Time    Glucose 61 (L) 05/13/2021 03:31 AM    Glucose (POC) 94 05/13/2021 07:32 AM       High dose Lantus scheduled ( home 85 units)  Pt is drinking gallon of sweet tea at home daily  Will drastically down adjust insulin and cont to closely monitor   Full note to follow   Isamar Vo MD

## 2021-05-13 NOTE — PROGRESS NOTES
CathPCI history & risk factor clarification: H&P 10/22/16    2. CAD: CABG x 4 in 2009 prior to bypass has PCI's with a total of 7 stents  3.  COPD; everyday smoker: 1 ppd

## 2021-05-13 NOTE — PROGRESS NOTES
Stable glucose levels on current diabetes regimen.        Recent Results (from the past 24 hour(s))   GLUCOSE, POC    Collection Time: 05/12/21  6:07 AM   Result Value Ref Range    Glucose (POC) 107 65 - 117 mg/dL    Performed by Maya Coats, POC    Collection Time: 05/12/21 12:08 PM   Result Value Ref Range    Glucose (POC) 216 (H) 65 - 117 mg/dL    Performed by [address] [place] [address], [address], POC    Collection Time: 05/12/21  5:29 PM   Result Value Ref Range    Glucose (POC) 117 65 - 117 mg/dL    Performed by Jeanine Morales, POC    Collection Time: 05/12/21 10:00 PM   Result Value Ref Range    Glucose (POC) 93 65 - 117 mg/dL    Performed by Morena Rabago        Will sign off, please call again if needed

## 2021-05-13 NOTE — PROGRESS NOTES
Problem: Falls - Risk of  Goal: *Absence of Falls  Description: Document Lisa Obrien Fall Risk and appropriate interventions in the flowsheet. Outcome: Progressing Towards Goal  Note: Fall Risk Interventions:  Mobility Interventions: Patient to call before getting OOB, Communicate number of staff needed for ambulation/transfer         Medication Interventions: Evaluate medications/consider consulting pharmacy, Patient to call before getting OOB, Teach patient to arise slowly    Elimination Interventions: Call light in reach, Toileting schedule/hourly rounds, Urinal in reach              Problem: Pressure Injury - Risk of  Goal: *Prevention of pressure injury  Description: Document Carlos Scale and appropriate interventions in the flowsheet.   Outcome: Progressing Towards Goal  Note: Pressure Injury Interventions:  Sensory Interventions: Minimize linen layers, Maintain/enhance activity level, Keep linens dry and wrinkle-free         Activity Interventions: Pressure redistribution bed/mattress(bed type), PT/OT evaluation, Increase time out of bed    Mobility Interventions: HOB 30 degrees or less, Pressure redistribution bed/mattress (bed type)    Nutrition Interventions: Discuss nutritional consult with provider, Document food/fluid/supplement intake                     Problem: Heart Failure: Discharge Outcomes  Goal: *Describes available resources and support systems  Description: (eg: Home Health, Palliative Care, Advanced Medical Directive)  Outcome: Progressing Towards Goal

## 2021-05-13 NOTE — PROGRESS NOTES
Physician Progress Note      Leandra Dash  CSN #:                  [id number]  :                       1960  ADMIT DATE:       2021 8:39 PM  [address] DATE:        2021 11:23 AM  RESPONDING  PROVIDER #:        Maria Isabel Chang DO          QUERY TEXT:    Dear attending,  Patient admitted with chest pain. Noted documentation of chest pain and NSTEMI. If possible, please document in progress notes and discharge summary if you are evaluating and /or treating any of the following: The medical record reflects the following:  Risk Factors: recent nstemi on  Clinical Indicators: -Troponin 0.23, -Trop 0.42, 0.23  Per H&P- Recent nstemi  -Per cardiology- Chest pain:  Troponin 1.99 this is as expected and declined  Would still be expected to be elevated after troponin 17.8 on 5/3/21  Per cardiology- Chest pain, elevated troponin/NSTEMI (not unexpected after very recent NSTEMI)  Treatment: Monitor labwork, imaging, vital signs  -Per cardiology- Pre-procedure Diagnosis: USA/NSTEMI  Post-procedure Diagnosis: 2v CAD, patent L-L, occ SVG-RCA, normal LVEDP  Per cardiology- NSTEI :  Sp emergent cath:  21  , mechanical thrombectomy, PTCA/PCI of the SVG-RCA w/ 4 SANTOS  Chest pain now resolved  Per dc summary- h/o type II diabetes and CAD with prior CABG, who presented to the ER with symptoms of left sided chest pain radiating to the left arm.  This was typical of prior anginal symptoms on 5/3/21  Treatment: Monitor labwork, vital signs, imaging, cardiac catheterization, Heparin gtt, Nitroglycerin gtt    Thank you,  Shayy Rader RN, BSN  Clinical documentation Improvement  [phone]  Options provided:  -- NSTEMI  -- Type 2 MI  -- Demand Ischemia with MI  -- Demand Ischemia only, no MI  -- Unstable Angina  -- Other - I will add my own diagnosis  -- Disagree - Not applicable / Not valid  -- Disagree - Clinically unable to determine / Unknown  -- Refer to Clinical Documentation Reviewer    PROVIDER RESPONSE TEXT:    This patient has an NSTEMI.     Query created by: Nay Jones on 5/13/2021 3:54 PM      Electronically signed by:  Fer Ovalle DO 5/13/2021 4:13 PM

## 2021-05-13 NOTE — CARDIO/PULMONARY
Cardiac Rehab: Met with Rey Jimenez, who was resting, and his wife. No additional educational material given as this is a readmit for a recurrent event and they still have the education material at home. Mrs. Swapna Dougherty reports they are trying to get patient's chest pain under control. The patient is getting frustrated with the recent recurrent readmissions and fatigue. I have spoken to Rey Jimenez in the past about cardiac rehab enrollment and he has declined re-enrollment. He continues to declined re-enrollment during this encounter. Rey Jimenez and wife declined additional questions or needs at this time and verbalized appreciation for the visit.  Sophy Cao RN

## 2021-05-14 VITALS
SYSTOLIC BLOOD PRESSURE: 122 MMHG | WEIGHT: 241.18 LBS | TEMPERATURE: 98.3 F | DIASTOLIC BLOOD PRESSURE: 65 MMHG | RESPIRATION RATE: 16 BRPM | OXYGEN SATURATION: 98 % | HEART RATE: 71 BPM | BODY MASS INDEX: 31.82 KG/M2

## 2021-05-14 LAB
GLUCOSE BLD STRIP.AUTO-MCNC: 108 MG/DL (ref 65–117)
SERVICE CMNT-IMP: NORMAL

## 2021-05-14 PROCEDURE — 82962 GLUCOSE BLOOD TEST: CPT

## 2021-05-14 PROCEDURE — 94760 N-INVAS EAR/PLS OXIMETRY 1: CPT

## 2021-05-14 PROCEDURE — 99231 SBSQ HOSP IP/OBS SF/LOW 25: CPT | Performed by: CLINICAL NURSE SPECIALIST

## 2021-05-14 PROCEDURE — 74011250637 HC RX REV CODE- 250/637: Performed by: INTERNAL MEDICINE

## 2021-05-14 RX ORDER — COLCHICINE 0.6 MG/1
0.6 TABLET ORAL DAILY
Qty: 30 TAB | Refills: 5 | Status: SHIPPED | OUTPATIENT
Start: 2021-05-14

## 2021-05-14 RX ORDER — FUROSEMIDE 40 MG/1
40 TABLET ORAL DAILY
Qty: 90 TAB | Refills: 3 | Status: SHIPPED | OUTPATIENT
Start: 2021-05-14

## 2021-05-14 RX ORDER — AMLODIPINE BESYLATE 2.5 MG/1
2.5 TABLET ORAL DAILY
Qty: 30 TAB | Refills: 5 | Status: SHIPPED | OUTPATIENT
Start: 2021-05-14

## 2021-05-14 RX ADMIN — Medication 10 ML: at 06:00

## 2021-05-14 RX ADMIN — FUROSEMIDE 40 MG: 40 TABLET ORAL at 09:32

## 2021-05-14 RX ADMIN — METOPROLOL SUCCINATE 50 MG: 50 TABLET, EXTENDED RELEASE ORAL at 09:32

## 2021-05-14 RX ADMIN — COLCHICINE 0.6 MG: 0.6 TABLET, FILM COATED ORAL at 09:32

## 2021-05-14 RX ADMIN — SACUBITRIL AND VALSARTAN 1 TABLET: 24; 26 TABLET, FILM COATED ORAL at 09:32

## 2021-05-14 RX ADMIN — AMLODIPINE BESYLATE 2.5 MG: 5 TABLET ORAL at 09:33

## 2021-05-14 RX ADMIN — DULOXETINE HYDROCHLORIDE 30 MG: 30 CAPSULE, DELAYED RELEASE ORAL at 09:00

## 2021-05-14 RX ADMIN — Medication 2000 UNITS: at 09:32

## 2021-05-14 RX ADMIN — ASPIRIN 81 MG: 81 TABLET, COATED ORAL at 09:32

## 2021-05-14 NOTE — DISCHARGE INSTRUCTIONS
Patient Education        Learning About Coronary Artery Disease (CAD)  What is coronary artery disease? Coronary artery disease is a condition that occurs when plaque builds up in the arteries that bring oxygen-rich blood to your heart. Plaque is a fatty substance made of cholesterol, calcium, and other substances in the blood. This process is called hardening of the arteries, or atherosclerosis. What happens when you have coronary artery disease? · Plaque may narrow the coronary arteries. Narrowed arteries cause poor blood flow. This can lead to angina symptoms such as chest pain or discomfort. If blood flow is completely blocked, you could have a heart attack. · You can slow and reduce the risk of future problems by making changes in your lifestyle. These include quitting smoking and eating heart-healthy foods. · Treatment, along with changes in your lifestyle, can help you live a longer and healthier life. How can you prevent coronary artery disease? · Do not smoke. It may be the best thing you can do to prevent coronary artery disease. If you need help quitting, talk to your doctor about stop-smoking programs and medicines. These can increase your chances of quitting for good. · Be active. Try to do moderate activity at least 2½ hours a week. Or try to do vigorous activity at least 1¼ hours a week. You may want to walk or try other activities, such as running, swimming, cycling, or playing tennis or team sports. · Eat heart-healthy foods. Eat more fruits and vegetables and less food that contains saturated and trans fats. Limit alcohol, sodium, and sweets. · Stay at a healthy weight. Lose weight if you need to. · Manage other health problems such as diabetes, high blood pressure, and high cholesterol. How is coronary artery disease treated? · Your doctor will suggest that you make lifestyle changes.  For example, your doctor may ask you to eat healthy foods, quit smoking, lose extra weight, and be more active. · You will take medicines that help prevent a heart attack. · Your doctor may suggest a procedure to open narrowed or blocked arteries. This is called angioplasty. Or your doctor may suggest using healthy blood vessels to create detours around narrowed or blocked arteries. This is called bypass surgery. Follow-up care is a key part of your treatment and safety. Be sure to make and go to all appointments, and call your doctor if you are having problems. It's also a good idea to know your test results and keep a list of the medicines you take. Where can you learn more? Go to http://www.gray.com/  Enter C643 in the search box to learn more about \"Learning About Coronary Artery Disease (CAD). \"  Current as of: August 31, 2020               Content Version: 12.8  © 2006-2021 Healthwise[employer] Inc[employer]. Care instructions adapted under license by [employer] (which disclaims liability or warranty for this information). If you have questions about a medical condition or this instruction, always ask your healthcare professional. [address] any warranty or liability for your use of this information.

## 2021-05-14 NOTE — DIABETES MGMT
[address] [facility]    CLINICAL NURSE SPECIALIST CONSULT  FOLLOW UP NOTE     Initial Presentation   Kel Varela is a 57 y.o. male who presented to the ED 5/7/21 with a 30 minute complaint of chest pain. Four days prior, the patient presented with left anterior chest pain and a nonacute EKG. His troponin was noted to be 17. He had had pain intermittently throughout the weekend. He was found to have 2 occluded vessels with thrombus and was restented. Today he started with left anterior chest pain similar to what he had several days ago and was brought by his wife to the emergency department. He was admitted for work-up. HX:   Past Medical History:   Diagnosis Date    CAD (coronary artery disease)     Diabetes ([address] [address]     Glaucoma     right eye    Hypercholesteremia     Hypertension     Ischemic cardiomyopathy     Kidney stones     MI, old     Systolic heart failure (HCC)         DX: Angina, Recurrent NSTEMI    TX: Cardiology consultation: Diagnostic cardiac cath    Hospital course   Clinical progress has been complicated by recurrent angina. 5/7: Cardiac cath, 2 vessel CAD s/p mechanical thrombectomy/PTCA    Discharged 5/9 but returned that same evening with recurrent chest pain: Nitro and heparin gtt restarted. Diabetes    Patient has known Type 2 diabetes, treated with farxiga, lantus and glipizide PTA. Family history positive for diabetes: Mom and two sisters with Type 2 diabetes    Admission BG 86 and A1c 6.8% indicate acceptable diabetes control.      Ambulatory blood glucose management provided by primary care provider: Rosalva Newman MD.    Consulted by Provider for advanced diabetes nursing assessment and care, specifically related to   [x] Inpatient management strategy  [x] Home management assessment  [x] Survival skill education    Diabetes-related medical history  Acute complications: None  Neurological complications  Peripheral neuropathy  Microvascular disease: None  Macrovascular disease  CAD and Myocardial infarction      Diabetes medication history  Drug class Currently in use Discontinued Never used   Biguanide      DDP-4 inhibitor       Sulfonylurea Glipizide 10 mg BID     Thiazolidinedione      GLP-1 RA      SGLT-2 inhibitors Farxiga 10 mg daily     Basal insulin Lantus 85 units daily     Bolus insulin      Fixed Dose  Combinations        Subjective   I think the insulin reduction is too much.     Continued lingering chest pain  CTA negative for PE/dissection  EKG unchanged      Cardiology following, on heparin gtt  Glipizide 10mg BID- d/c    65 Lantus daily  Correctional insulin: 2 units in last 24h  Fasting glucose 108  24 hr glucose pattern:   A1C 6.8%  PCP Chantelle Hicks  Low PO intake  Discharge today  Objective   Physical exam  General Alert, oriented and in no acute distress/ill-appearing. Conversant and cooperative. Vital Signs   Visit Vitals  /65 (BP 1 Location: Left arm, BP Patient Position: At rest)   Pulse 71   Temp 98.3 °F (36.8 °C)   Resp 16   Wt 109.4 kg (241 lb 2.9 oz)   SpO2 98%   BMI 31.82 kg/m²     Skin  Warm and dry. No acanthosis noted along neckline. No lipohypertrophy or lipoatrophy noted at injection sites   Heart   Regular rate and rhythm. No murmurs, rubs or gallops  Lungs  Clear to auscultation without rales or rhonchi  Extremities No foot wounds        Laboratory  No results found for this visit on 05/09/21 (from the past 12 hour(s)). No results found for this visit on 05/09/21 (from the past 12 hour(s)).   All inpatient labs reviewed in full    Factors impacting BG management  Factor Dose Comments   Nutrition:  Carb-controlled meals     60 grams/meal   Cardiac diet     Blood glucose pattern        Assessment and Plan   Nursing Diagnosis Risk for unstable blood glucose pattern   Nursing Intervention Domain 3579 Decision-making Support   Nursing Interventions Examined current inpatient diabetes control   Explored factors facilitating and impeding inpatient management  Identified self-management practices impeding diabetes control  Explored corrective strategies with patient and responsible inpatient provider   Informed patient of rational for insulin strategy while hospitalized     Evaluation   Rae Rees is a 57year old gentleman with controlled Type 2 Diabetes on Lantus, Farixga, and Glipizide admitted with unstable angina in the setting of recurrent NSTEMI with recent stent placement. Troponin slowly rising with suspicion of graft occlusion, now on heparin gtt. A1C 6.8% and admitting glucose 86 demonstrate excellent glucose control with combination therapy. During admission, glipizide and lantus were resumed at PTA doses but glipizide d/c due to marginal PO intake. Blood glucose  in the last 24 hrs with a fasting glucose of 108 indicating appropriate basal insulin dosing. Inpatient glucose goal 100-180. Recommendations   1. POC glucose ACHS  2. Consistent carbohydrate/cardiac diet    [x] Use of Subcutaneous Insulin Order set (3352)  Insulin Dosing Specific recommendation   Continue Basal                                      (Based on weight, BMI & GFR) 65 units daily. May likely need more. If fasting glucose   consistently below 100,   reduce Lantus by additional   20%   Continue Corrective                          (Useful in adjusting insulin dosing) [x] Insulin-resistant sensitivity      Discharge Planning   1. Patient to obtain a blood glucose reading 2 times daily. First thing in the morning prior to eating and drinking anything then before bedtime. Create a log and present to PCP for interpretation. If experiencing more than 3 low glucose values first thing in the morning, patient instructed to reach out to PCP as a reduction in basal insulin is needed. 2. Continue all antihyperglycemic agents at PTA doses.   Billing Code(s)   [x] [id number]  Before making these care recommendations, I personally reviewed the hosptialization record, including laboratory and diagnostic data, medications and examined the patient at bedside (circumstances permitting).   Total minutes: 13      Benjamín Enamorado CNS  Diabetes Clinical Nurse Specialist  [facility]  Access via [facility]

## 2021-05-14 NOTE — DISCHARGE SUMMARY
Cardiology Discharge Summary     Patient ID:  Leora Elias  [id number]  37 y.o.  1960    Allergies: Tape [adhesive]    Admit Date: 5/9/2021    Discharge Date: 5/14/2021     Admitting Physician: Rosa Tillman MD     Discharge Physician: Gilda Jones MD    * Admission Diagnoses: Coronary artery disease involving coronary bypass graft of native heart with unstable angina pectoris [facility]) [I25.700]    * Discharge Diagnoses:   Hospital Problems as of 5/14/2021 Date Reviewed: 5/10/2021          Codes Class Noted - Resolved POA    Coronary artery disease involving coronary bypass graft of native heart with unstable angina pectoris [facility]) ICD-10-CM: I25.700  ICD-9-CM: 414.05, 411.1  5/9/2021 - Present Unknown              * Discharge Condition: good and improved      [facility] Course:   Mr Mariana Byers was readmitted with recurrent chest pain. Troponin was elevated (slow rise to just over 3) consistent with a recurrent NSTEMI. He had a recent admission with PCI of his occluded SVG to RCA done by Dr Isma Oden. There is some concern that this graft may have re-occluded. He had fairly prolonged chest pain without any new EKG changes. A CTA chest was done that was negative for PE and aortic dissection. He was started on colchicine on 5/13 in case this may have been some degree of pericarditis. He had no further chest pain in the 24hrs prior to discharge and has been ambulating without much difficulty. Amlodipine and Ranexa were added to his regimen. 05/03/21   ECHO ADULT COMPLETE 05/04/2021 5/4/2021    Narrative · Image quality for this study was technically difficult. · Contrast used: DEFINITY. · LV: Estimated LVEF is 45 - 50%. Normal wall thickness. Dilated left   ventricle. Inconclusive left ventricular diastolic function. · MV: Mitral valve non-specific thickening. Mild to moderate mitral valve   regurgitation is present. · LA: Dilated left atrium.         Signed by: Moe Harry MD Consults: None    Discharge Exam:     Visit Vitals  /64 (BP 1 Location: Right arm, BP Patient Position: At rest)   Pulse 63   Temp 98 °F (36.7 °C)   Resp 16   Wt 110.3 kg (243 lb 2.7 oz)   SpO2 95%   BMI 32.08 kg/m²     General Appearance:  Well developed, well nourished,alert and oriented x 3, and individual in no acute distress. Ears/Nose/Mouth/Throat:   Hearing grossly normal.         Neck: Supple. Chest:   Lungs clear to auscultation bilaterally. Cardiovascular:  Regular rate and rhythm, S1, S2 normal, 2/6 systolic murmur. Abdomen:   Soft, non-tender, bowel sounds are active. Extremities: No edema bilaterally. Skin: Warm and dry. * Disposition: Home    Discharge Medications:   Current Discharge Medication List      START taking these medications    Details   amLODIPine (NORVASC) 2.5 mg tablet Take 1 Tab by mouth daily. Qty: 30 Tab, Refills: 5  Start date: 5/14/2021      colchicine 0.6 mg tablet Take 1 Tab by mouth daily. Qty: 30 Tab, Refills: 5  Start date: 5/14/2021         CONTINUE these medications which have CHANGED    Details   furosemide (Lasix) 40 mg tablet Take 1 Tab by mouth daily. Qty: 90 Tab, Refills: 3  Start date: 5/14/2021         CONTINUE these medications which have NOT CHANGED    Details   ticagrelor (BRILINTA) 90 mg tablet Take 1 Tab by mouth every twelve (12) hours every twelve (12) hours. Qty: 180 Tab, Refills: 3  Start date: 5/9/2021      cycloSPORINE (Restasis) 0.05 % dpet Administer 1 Drop to both eyes daily. cyclobenzaprine HCl (FLEXERIL PO) Take 10 mg by mouth three (3) times daily as needed for Pain.      evolocumab (Repatha Pushtronex) 420 mg/3.5 mL Injt 10 mg by SubCUTAneous route every thirty (30) days. Every 2 weeks on Sunday; next dose due 5/20/18  Qty: 2 Cartridge, Refills: 5      ranolazine ER (Ranexa) 500 mg SR tablet Take 1 Tab by mouth two (2) times a day.   Qty: 60 Tab, Refills: 5      cholecalciferol, vitamin D3, (VITAMIN D3) 2,000 unit tab Take 2,000 Units by mouth daily. multivits,Stress Formula-Zinc tablet Take 1 Tab by mouth daily. sacubitril-valsartan (ENTRESTO) 24 mg/26 mg tablet Take 1 Tab by mouth every twelve (12) hours. Qty: 60 Tab, Refills: 11      insulin glargine (LANTUS,BASAGLAR) 100 unit/mL (3 mL) inpn 85 Units by SubCUTAneous route nightly. dapagliflozin (FARXIGA) 10 mg tab tablet Take 10 mg by mouth daily. aspirin delayed-release 81 mg tablet Take 1 Tab by mouth daily. Qty: 30 Tab, Refills: 11      nebivolol (BYSTOLIC) 5 mg tablet Take 5 mg by mouth every evening. DULoxetine (CYMBALTA) 30 mg capsule Take 30 mg by mouth daily. glipiZIDE SR (GLUCOTROL XL) 5 mg CR tablet Take 10 mg by mouth Before breakfast and dinner. STOP taking these medications       diclofenac EC (VOLTAREN) 75 mg EC tablet Comments:   Reason for Stopping:               * Follow-up Care/Patient Instructions:    Activity:Activity as tolerated  Diet: Diabetic Diet  Wound Care: None needed    Follow-up Information     Follow up With Specialties Details Why Contact Info    Abel Caceres MD [facility]  [phone]      Danae Smith MD Cardiology Schedule an appointment as soon as possible for a visit in 1 week [facility] follow-up [address]            Signed:  Joann Menon MD  5/14/2021  7:42 AM

## 2021-05-14 NOTE — PROGRESS NOTES
Hospital follow-up PCP transitional care appointment has been scheduled with Dr. Eugenio Royal for Tuesday, 5/18/21 at 2:00 p.m. Pending patient discharge.   Sarah Ramirez, Care Management Specialist.

## 2022-02-15 NOTE — Clinical Note
(Inserted Image. Unable to display)   [address] [place] [address]  April 25, 2019    PHILIPPE ARIAS KLEVER   [place], [place] [id number]      Dear PHILIPPE,      Thank you for selecting [facility] for your [facility] needs.      Your stool testing was NEGATIVE - no blood in the stool. We check this once a year.          Please contact me or my assistant at [phone] if you have any questions or concerns.     Sincerely,        Eliz Lamas MD   Sheath #1: Closed using Angio-Seal.

## 2022-03-18 PROBLEM — I21.4 NSTEMI (NON-ST ELEVATED MYOCARDIAL INFARCTION) (HCC): Status: ACTIVE | Noted: 2018-05-18

## 2022-03-18 PROBLEM — I20.0 UNSTABLE ANGINA (HCC): Status: ACTIVE | Noted: 2018-06-05

## 2022-03-19 PROBLEM — I25.700 CORONARY ARTERY DISEASE INVOLVING CORONARY BYPASS GRAFT OF NATIVE HEART WITH UNSTABLE ANGINA PECTORIS (HCC): Status: ACTIVE | Noted: 2021-05-09

## 2022-03-19 PROBLEM — I21.3 STEMI (ST ELEVATION MYOCARDIAL INFARCTION) (HCC): Status: ACTIVE | Noted: 2019-02-20

## 2022-03-20 PROBLEM — I21.19 ACUTE ST ELEVATION MYOCARDIAL INFARCTION (STEMI) OF INFERIOR WALL (HCC): Status: ACTIVE | Noted: 2019-02-21

## 2022-03-20 PROBLEM — I50.21 SYSTOLIC CHF, ACUTE (HCC): Status: ACTIVE | Noted: 2018-05-23

## 2022-03-20 PROBLEM — R07.9 CHEST PAIN: Status: ACTIVE | Noted: 2021-05-07

## 2022-05-23 NOTE — PROGRESS NOTES
PT Name: Temitope Suero  MR #: [id number]     Documentation Clarification      CDS/:  Donaldo Rowell, RN, CDS                   Contact Information:  [email or web address]      This form is a permanent document in the medical record.     Query Date: May 23, 2022    By submitting this query, we are merely seeking further clarification of documentation. Please utilize your independent clinical judgment when addressing the question(s) below.    The Medical Record reflects the following:    Clinical Findings Location in Medical Records      Diagnosis requiring critical care: Acute Respiratory Failure    Around 11am, called to bedside for increased work of breathing, agitation, and diaphoresis.  Rapid Response and bedside RN and LPN at bedside.  On arrival, BP unable to be achieved manually.  500cc LR bolus ordered.  CXR ordered.  Placed on 2L NC.  EKG ordered with afib, HR 93.  Sugar 247, given insulin.  Labs ordered and antibiotics upescalated to Zosyn.   BP calculated on the leg as 130s after receiving partial bolus.  Mentation improved and breathing settled.    Pulmonary/Chest: Increased work of breathing, accessory muscle use       SE Note 5/6 1225   Reason for alert: extreme agitation, unable to obtain blood pressure, increased work of breathing    Called to evaluate the patient for neuro and respiratory    The patient was seen for a Neurological problem. Staff concerns included unexplained agitation or delirium. The following interventions were performed: POCT glucose and basic metabolic panel.  Upon arrival to bedside, pt extremely agitated and trying to get out of bed.  Pt moving all extremities but unable to answer questions.  Pt diaphoretic.  Unable to obtain blood pressure.  Dr. Omar glez and MD came to bedside.  EKG ordered and obtained and shown to Dr. Nelson.  CXR obtained.  Pt placed on 2L NC.  Pt's agitation subsided, but remained tachypnic.  We were eventually able to get a saturation of 100%  Transition Of Care Note    Patient discharged from [facility] on  for unstable angina, PCI. Medical History:     Past Medical History:   Diagnosis Date    Diabetes ([address]     Glaucoma     right eye    Hypercholesteremia     Hypertension     Kidney stones     MI, old        Care Manager contacted the patient by telephone to perform post hospital discharge assessment. Verified  and zip code with patient as identifiers. Provided introduction to self, and explanation of the Nurse Care Manager role. Medication:   New Medications at Discharge: entresto 24/26 mg every 12 hours  Changed Medications at Discharge: none noted  Discontinued Medications at Discharge: lisinopril  Current Outpatient Prescriptions   Medication Sig    sacubitril-valsartan (ENTRESTO) 24 mg/26 mg tablet Take 1 Tab by mouth every twelve (12) hours.  furosemide (LASIX) 40 mg tablet Take 1 Tab by mouth daily.  insulin glargine (LANTUS,BASAGLAR) 100 unit/mL (3 mL) inpn 85 Units by SubCUTAneous route nightly.  evolocumab (REPATHA PUSHTRONEX SC) 10 mg by SubCUTAneous route every thirty (30) days. Every 2 weeks on ; next dose due 18     dapagliflozin (FARXIGA) 10 mg tab tablet Take 10 mg by mouth daily.  aspirin delayed-release 81 mg tablet Take 1 Tab by mouth daily.  prasugrel (EFFIENT) 10 mg tablet Take 1 Tab by mouth nightly.  nebivolol (BYSTOLIC) 5 mg tablet Take 5 mg by mouth every evening.  DULoxetine (CYMBALTA) 30 mg capsule Take 30 mg by mouth daily.  glipiZIDE (GLUCOTROL) 10 mg tablet Take 10 mg by mouth two (2) times a day. No current facility-administered medications for this visit. There are no discontinued medications. Performed medication reconciliation with patient, and patient verbalizes understanding of administration of home medications. There were no barriers to obtaining medications identified at this time.     Inpatient RRAT score: no score noted in chart  Was this a readmission? no   Patient stated reason for the readmission: n/a    Barriers/Support system:  patient and wife      Red Flags:  Call 911 anytime you think you may need emergency care. For example, call if:  · You have signs of a heart attack. These may include:  · Chest pain or pressure. · Sweating. · Shortness of breath. · Nausea or vomiting. · Pain that spreads from the chest to the neck, jaw, or one or both shoulders or arms. · Dizziness or lightheadedness. · A fast or irregular pulse. After calling 911, chew 1 adult-strength aspirin. Wait for an ambulance. Do not try to drive yourself. · You passed out (lost consciousness). · You feel like you are having another heart attack. Call your doctor now or seek immediate medical care if:  · You have had any chest pain, even if it has gone away. · You have new or increased shortness of breath. You are dizzy or lightheaded, or you feel like you may faint. Call 911 if you have symptoms of sudden heart failure such as:  ? · You have severe trouble breathing. ? · You cough up pink, foamy mucus. ? · You have a new irregular or rapid heartbeat. ?Call your doctor now or seek immediate medical care if:  ? · You have new or increased shortness of breath. ? · You are dizzy or lightheaded, or you feel like you may faint. ? · You have sudden weight gain, such as more than 2 to 3 pounds in a day or 5 pounds in a week. (Your doctor may suggest a different range of weight gain.)   ? · You have increased swelling in your legs, ankles, or feet. ? · You are suddenly so tired or weak that you cannot do your usual activities. Discharge Instructions :  Reviewed discharge instructions with patient. Patient verbalizes understanding of discharge instructions and follow-up care. Pt is exercising regularly, eating low salt diet, and trying to adhere to nutritious diet, but admits to some difficulty.  Also admits to smoking occasionally, though has not since via portable oximeter.  Labs ordered per Dr. Nelson.  500mL bolus ordered to be given over 2 hours due to pt's cardiac history.  Echo ordered yesterday pending.  Dr. Nelson suspecting aspiration and antibiotics adjusted.  Upon departure pt's vitals: 139/90 (111), HR 82, RR 30, 100% oxygen saturation.   Rapid Response Note 5/6 1210   Ms. Suero was admitted to Hospital Medicine for management of a toxic encephalopathy 2/2 LLL pneumonia.  She was started on Ceftriaxone/Azithro.  Her mentation was slow to improve.  SLP evaluated and cleared for diet.  She was given small IVF boluses for her MAC.      PN 5/7   Acute/Chronic Illnesses:    Metabolic Encephalopathy  Aspiration PNA  Moderate Malnutrition  Atrial Flutter  LV  Fall  Paroxysmal atrial fibrillation   Mixed hyperlipidemia  History of stroke  Chronic anticoagulation   Chronic systolic heart failure   Deterioration of memory   Type 2 diabetes mellitus with stage 3 chronic kidney disease, without long-term current use of insulin   Acute kidney injury superimposed on CKD   NSTEMI (non-ST elevated myocardial infarction)         PN 5/17   There are left axillary clips.  There is cardiomegaly and resolving perihilar edema.  There is improvement.    No results for input(s): PH, PCO2, PO2, HCO3, POCSATURATED, BE in the last 72 hours CXR 5/6      RR Care Update 5/6 4926                                                                                  Provider, please clarify the diagnosis of Acute Respiratory Failure:    [   ] Acute Respiratory Failure is not confirmed and/or has been ruled out   [   ] Acute Respiratory Failure is confirmed and additional clinical support/decision-making indicators for the diagnosis include (please specify):________________      Please specify type: ___________________     [ x  ] Above stated diagnosis is not confirmed, other diagnosis ruled in       [  x ] Acute Respiratory Distress       [   ]  (please specify):_______________     [   ]  Other clarification (please specify): ___________________     [   ] Clinically undetermined                  discharge home. This CM will send smoking cessation information from [employer] [place] and heart healthy recipes from [employer]. Focused Assessment:   Skin (blisters, reddened areas, rash, open/draining wounds, swelling, ingrown toenails)  Pulse/rhythm-wife is cardiac nurse, checking daily   BP (high or low, severe headache, blurry vision)- checks twice a day- could not recall last reading  Pain (chest or pressure)- denies  Activity level (decrease due to fatigue, SOB, MILLARD, pain, dizziness)  Cardiac rehab- recommended, pt states he is exercising regularly  Nutrition/hydration- working on nutrition, this CM to send recipes, pt verbalizes he eat low salt  Elimination-not discussed, pt taking lasix 40mg daily  Weight- stable, checks daily  Edema- denied  Orthopnea (# of pillows)-not discussed  Medications-see above  Core measures:  MI  aspirin, ACE/ARB for LVSD, smoking cessation counseling, beta blocker, stain  all at DC  CHF  ACE/ARB/REJI Rodriguez), beta blocker, LV function, post DC appointment with MD     Advance Care Planning:   Patient was offered the opportunity to discuss advance care planning:  no     Does patient have an Advance Directive:  no   If no, did you provide information on Caring Connections?  no     PCP/Specialist follow up: Patient scheduled to follow up with Janis Olsen NP on 6/11 and Dr. Brianna Hart on 6/14. No future appointments. Reviewed red flags with patient, and patient verbalizes understanding. Patient given an opportunity to ask questions. No other clinical/social/functional needs noted. The patient agrees to contact the PCP office for questions related to their healthcare. The patient expressed thanks, offered no additional questions and ended the call.

## 2023-05-11 RX ORDER — GLIPIZIDE 5 MG/1
TABLET, FILM COATED, EXTENDED RELEASE ORAL
COMMUNITY

## 2023-05-11 RX ORDER — COLCHICINE 0.6 MG/1
TABLET ORAL DAILY
COMMUNITY
Start: 2021-05-14

## 2023-05-11 RX ORDER — EVOLOCUMAB 420 MG/3.5
KIT SUBCUTANEOUS
COMMUNITY
Start: 2021-05-04

## 2023-05-11 RX ORDER — NEBIVOLOL 5 MG/1
TABLET ORAL EVERY EVENING
COMMUNITY

## 2023-05-11 RX ORDER — DULOXETIN HYDROCHLORIDE 30 MG/1
30 CAPSULE, DELAYED RELEASE ORAL DAILY
COMMUNITY

## 2023-05-11 RX ORDER — INSULIN GLARGINE 100 [IU]/ML
INJECTION, SOLUTION SUBCUTANEOUS
COMMUNITY

## 2023-05-11 RX ORDER — CYCLOSPORINE 0.5 MG/ML
1 EMULSION OPHTHALMIC DAILY
COMMUNITY

## 2023-05-11 RX ORDER — RANOLAZINE 500 MG/1
TABLET, EXTENDED RELEASE ORAL 2 TIMES DAILY
COMMUNITY
Start: 2021-05-04

## 2023-05-11 RX ORDER — AMLODIPINE BESYLATE 2.5 MG/1
TABLET ORAL DAILY
COMMUNITY
Start: 2021-05-14

## 2023-05-11 RX ORDER — ASPIRIN 81 MG/1
TABLET ORAL DAILY
COMMUNITY
Start: 2016-10-25

## 2023-05-11 RX ORDER — FUROSEMIDE 40 MG/1
TABLET ORAL DAILY
COMMUNITY
Start: 2021-05-14

## 2023-05-20 ENCOUNTER — APPOINTMENT (OUTPATIENT)
Facility: HOSPITAL | Age: 63
End: 2023-05-20
Payer: COMMERCIAL

## 2023-05-20 ENCOUNTER — HOSPITAL ENCOUNTER (EMERGENCY)
Facility: HOSPITAL | Age: 63
Discharge: HOME | End: 2023-05-20
Attending: EMERGENCY MEDICINE
Payer: COMMERCIAL

## 2023-05-20 VITALS
SYSTOLIC BLOOD PRESSURE: 116 MMHG | TEMPERATURE: 98.4 F | BODY MASS INDEX: 30.03 KG/M2 | RESPIRATION RATE: 17 BRPM | HEIGHT: 74 IN | WEIGHT: 234 LBS | HEART RATE: 74 BPM | DIASTOLIC BLOOD PRESSURE: 61 MMHG | OXYGEN SATURATION: 91 %

## 2023-05-20 DIAGNOSIS — R07.89 ATYPICAL CHEST PAIN: Primary | ICD-10-CM

## 2023-05-20 LAB
ALBUMIN SERPL-MCNC: 3.6 G/DL (ref 3.5–5)
ALBUMIN/GLOB SERPL: 1.2 (ref 1.1–2.2)
ALP SERPL-CCNC: 107 U/L (ref 45–117)
ALT SERPL-CCNC: 31 U/L (ref 12–78)
ANION GAP SERPL CALC-SCNC: 5 MMOL/L (ref 5–15)
AST SERPL-CCNC: 14 U/L (ref 15–37)
BILIRUB SERPL-MCNC: 0.3 MG/DL (ref 0.2–1)
BUN SERPL-MCNC: 16 MG/DL (ref 6–20)
BUN/CREAT SERPL: 13 (ref 12–20)
CALCIUM SERPL-MCNC: 8.9 MG/DL (ref 8.5–10.1)
CHLORIDE SERPL-SCNC: 108 MMOL/L (ref 97–108)
CO2 SERPL-SCNC: 27 MMOL/L (ref 21–32)
COMMENT:: NORMAL
CREAT SERPL-MCNC: 1.28 MG/DL (ref 0.7–1.3)
ERYTHROCYTE [DISTWIDTH] IN BLOOD BY AUTOMATED COUNT: 13.3 % (ref 11.5–14.5)
GLOBULIN SER CALC-MCNC: 3.1 G/DL (ref 2–4)
GLUCOSE SERPL-MCNC: 234 MG/DL (ref 65–100)
HCT VFR BLD AUTO: 43.4 % (ref 36.6–50.3)
HGB BLD-MCNC: 14.7 G/DL (ref 12.1–17)
MCH RBC QN AUTO: 29.8 PG (ref 26–34)
MCHC RBC AUTO-ENTMCNC: 33.9 G/DL (ref 30–36.5)
MCV RBC AUTO: 87.9 FL (ref 80–99)
NRBC # BLD: 0 K/UL (ref 0–0.01)
NRBC BLD-RTO: 0 PER 100 WBC
PLATELET # BLD AUTO: 255 K/UL (ref 150–400)
PMV BLD AUTO: 9.4 FL (ref 8.9–12.9)
POTASSIUM SERPL-SCNC: 3.8 MMOL/L (ref 3.5–5.1)
PROT SERPL-MCNC: 6.7 G/DL (ref 6.4–8.2)
RBC # BLD AUTO: 4.94 M/UL (ref 4.1–5.7)
SODIUM SERPL-SCNC: 140 MMOL/L (ref 136–145)
SPECIMEN HOLD: NORMAL
TROPONIN I SERPL HS-MCNC: 10 NG/L (ref 0–57)
TROPONIN I SERPL HS-MCNC: 9 NG/L (ref 0–57)
WBC # BLD AUTO: 8.4 K/UL (ref 4.1–11.1)

## 2023-05-20 PROCEDURE — 99285 EMERGENCY DEPT VISIT HI MDM: CPT

## 2023-05-20 PROCEDURE — 93005 ELECTROCARDIOGRAM TRACING: CPT | Performed by: EMERGENCY MEDICINE

## 2023-05-20 PROCEDURE — 36415 COLL VENOUS BLD VENIPUNCTURE: CPT

## 2023-05-20 PROCEDURE — 84484 ASSAY OF TROPONIN QUANT: CPT

## 2023-05-20 PROCEDURE — 85027 COMPLETE CBC AUTOMATED: CPT

## 2023-05-20 PROCEDURE — 71046 X-RAY EXAM CHEST 2 VIEWS: CPT

## 2023-05-20 PROCEDURE — 80053 COMPREHEN METABOLIC PANEL: CPT

## 2023-05-20 PROCEDURE — 93005 ELECTROCARDIOGRAM TRACING: CPT | Performed by: STUDENT IN AN ORGANIZED HEALTH CARE EDUCATION/TRAINING PROGRAM

## 2023-05-20 RX ORDER — LIDOCAINE 50 MG/G
1 PATCH TOPICAL DAILY
Qty: 10 PATCH | Refills: 0 | Status: SHIPPED | OUTPATIENT
Start: 2023-05-20 | End: 2023-05-30

## 2023-05-20 ASSESSMENT — ENCOUNTER SYMPTOMS
ABDOMINAL PAIN: 0
NAUSEA: 0
EYE PAIN: 0
SHORTNESS OF BREATH: 0
COUGH: 1
EYE REDNESS: 0
DIARRHEA: 0

## 2023-05-20 ASSESSMENT — HEART SCORE: ECG: 1

## 2023-05-20 NOTE — ED TRIAGE NOTES
Pt arrives from home with cc of intermittent chest pain that started around noon today. The pain started after he was walking. The pain is on the left side of his chest and intermittently radiates to the left side of the back. No other symptoms at this time. Has extensive cardiac hx. He thinks he has around 9 stents but lost count.  Last MI in 2021/

## 2023-05-20 NOTE — ED NOTES
4:46 PM  I have evaluated the patient as the Provider in Triage. I have reviewed his vital signs and the triage nurse assessment. I have talked with the patient and any available family and advised that I am the provider in triage and have ordered the appropriate study to initiate their work up based on the clinical presentation during my assessment. I have advised that the patient will be accommodated in the Main ED as soon as possible. I have also requested to contact the triage nurse or myself immediately if the patient experiences any changes in their condition during this brief waiting period.   Melanie Self PA  05/20/23 0491

## 2023-05-21 LAB
EKG ATRIAL RATE: 73 BPM
EKG ATRIAL RATE: 81 BPM
EKG DIAGNOSIS: NORMAL
EKG DIAGNOSIS: NORMAL
EKG P AXIS: 37 DEGREES
EKG P AXIS: 57 DEGREES
EKG P-R INTERVAL: 204 MS
EKG P-R INTERVAL: 206 MS
EKG Q-T INTERVAL: 418 MS
EKG Q-T INTERVAL: 460 MS
EKG QRS DURATION: 156 MS
EKG QRS DURATION: 158 MS
EKG QTC CALCULATION (BAZETT): 485 MS
EKG QTC CALCULATION (BAZETT): 506 MS
EKG R AXIS: 13 DEGREES
EKG R AXIS: 17 DEGREES
EKG T AXIS: -8 DEGREES
EKG T AXIS: 7 DEGREES
EKG VENTRICULAR RATE: 73 BPM
EKG VENTRICULAR RATE: 81 BPM

## (undated) DEVICE — Device

## (undated) DEVICE — INTENDED FOR TISSUE SEPARATION, AND OTHER PROCEDURES THAT REQUIRE A SHARP SURGICAL BLADE TO PUNCTURE OR CUT.: Brand: BARD-PARKER ®  SAFETY SCALPED

## (undated) DEVICE — KIT MED IMAG CNTRST AGNT W/ IOPAMIDOL REUSE

## (undated) DEVICE — RUNTHROUGH NS EXTRA FLOPPY PTCA GUIDEWIRE: Brand: RUNTHROUGH

## (undated) DEVICE — PINNACLE INTRODUCER SHEATH: Brand: PINNACLE

## (undated) DEVICE — ANGIOGRAPHIC CATHETER: Brand: IMPULSE™

## (undated) DEVICE — PACK PROCEDURE SURG HRT CATH

## (undated) DEVICE — WASTE KIT - ST MARY: Brand: MEDLINE INDUSTRIES, INC.

## (undated) DEVICE — ASSEMBLY CANSTR ENGINE -- INDIGO ASPIRATION SYSTEM

## (undated) DEVICE — 3M™ TEGADERM™ TRANSPARENT FILM DRESSING FRAME STYLE, 1626W, 4 IN X 4-3/4 IN (10 CM X 12 CM), 50/CT 4CT/CASE: Brand: 3M™ TEGADERM™

## (undated) DEVICE — CATHETER THROMCTMY L140CM RX L LUMN ASPIR TBNG INDIGO

## (undated) DEVICE — CUSTOM KT PTCA INFL DEV K05 00052M

## (undated) DEVICE — Device: Brand: PADPRO

## (undated) DEVICE — ANGIOGRAPHY KIT

## (undated) DEVICE — EP SPD2-US-030-320 SPIDER FX V04
Type: IMPLANTABLE DEVICE | Status: NON-FUNCTIONAL
Brand: SPIDERFX™
Removed: 2021-05-07

## (undated) DEVICE — KIT MFLD ISOLATN NACL CNTRST PRT TBNG SPIK W/ PRSS TRNSDUC

## (undated) DEVICE — GUIDEWIRE VASC L260CM DIA0.035IN TIP L3MM STD EXCHG PTFE J

## (undated) DEVICE — KIT HND CTRL 3 W STPCOCK ROT END 54IN PREM HI PRSS TBNG AT

## (undated) DEVICE — TR BAND RADIAL ARTERY COMPRESSION DEVICE: Brand: TR BAND

## (undated) DEVICE — ANGIO-SEAL VIP VASCULAR CLOSURE DEVICE: Brand: ANGIO-SEAL

## (undated) DEVICE — SHEATH RAIN RAD INTRO SHTH W/ BARE WIRE 10 CM 506610S

## (undated) DEVICE — SPECIAL PROCEDURE DRAPE 32" X 34": Brand: SPECIAL PROCEDURE DRAPE

## (undated) DEVICE — CATH BLLN DIL 3.5X 15 RX -- EUPHORA

## (undated) DEVICE — SPLINT WR POS F/ARTERIAL ACC -- BX/10

## (undated) DEVICE — MICROPUNCTURE INTRODUCER SET SILHOUETTE TRANSITIONLESS WITH STAINLESS STEEL WIRE GUIDE: Brand: MICROPUNCTURE

## (undated) DEVICE — CATH BLLN DIL 2.0X15MM RX -- EUPHORA

## (undated) DEVICE — CATH GUID COR JR4.0S 6FR 100CM -- LAUNCHER

## (undated) DEVICE — CATH ANGIO IMPLS PGTL 6FR 100 -- IMPULSE 16599-300

## (undated) DEVICE — DRAPE PRB US TRNSDCR 6X96IN --

## (undated) DEVICE — GUIDEWIRE VASC L150CM DIA0.035IN FLX TIP L7CM PTFE STR FIX

## (undated) DEVICE — TREK CORONARY DILATATION CATHETER 3.0 MM X 15 MM / RAPID-EXCHANGE: Brand: TREK

## (undated) DEVICE — CATH GUID COR MP1 6FR 100CM -- LAUNCHER

## (undated) DEVICE — CATH ANGI BLLN DIL 3.75X20MM -- NC EUPHORA